# Patient Record
Sex: FEMALE | Race: WHITE | NOT HISPANIC OR LATINO | Employment: OTHER | URBAN - METROPOLITAN AREA
[De-identification: names, ages, dates, MRNs, and addresses within clinical notes are randomized per-mention and may not be internally consistent; named-entity substitution may affect disease eponyms.]

---

## 2023-04-03 ENCOUNTER — HOSPITAL ENCOUNTER (OUTPATIENT)
Dept: DATA CONVERSION | Facility: HOSPITAL | Age: 68
End: 2023-04-03
Attending: INTERNAL MEDICINE | Admitting: INTERNAL MEDICINE
Payer: MEDICARE

## 2023-04-03 DIAGNOSIS — I25.119 ATHEROSCLEROTIC HEART DISEASE OF NATIVE CORONARY ARTERY WITH UNSPECIFIED ANGINA PECTORIS (CMS-HCC): ICD-10-CM

## 2023-04-03 DIAGNOSIS — R07.9 CHEST PAIN, UNSPECIFIED: ICD-10-CM

## 2023-04-03 DIAGNOSIS — E78.5 HYPERLIPIDEMIA, UNSPECIFIED: ICD-10-CM

## 2023-04-03 DIAGNOSIS — I10 ESSENTIAL (PRIMARY) HYPERTENSION: ICD-10-CM

## 2023-04-03 DIAGNOSIS — R93.1 ABNORMAL FINDINGS ON DIAGNOSTIC IMAGING OF HEART AND CORONARY CIRCULATION: ICD-10-CM

## 2023-04-03 DIAGNOSIS — E66.9 OBESITY, UNSPECIFIED: ICD-10-CM

## 2023-05-24 PROBLEM — H52.7 REFRACTIVE ERROR: Status: ACTIVE | Noted: 2023-05-24

## 2023-05-24 PROBLEM — H57.9 ITCHY EYES: Status: ACTIVE | Noted: 2023-05-24

## 2023-05-24 PROBLEM — N18.30 CKD (CHRONIC KIDNEY DISEASE) STAGE 3, GFR 30-59 ML/MIN (MULTI): Status: ACTIVE | Noted: 2023-05-24

## 2023-05-24 PROBLEM — I87.2 CHRONIC VENOUS INSUFFICIENCY: Status: ACTIVE | Noted: 2023-05-24

## 2023-05-24 PROBLEM — H02.9 LESION OF RIGHT EYELID: Status: ACTIVE | Noted: 2023-05-24

## 2023-05-24 PROBLEM — Z97.3 WEARS EYEGLASSES: Status: ACTIVE | Noted: 2023-05-24

## 2023-05-24 PROBLEM — E03.9 HYPOTHYROIDISM: Status: ACTIVE | Noted: 2023-05-24

## 2023-05-24 PROBLEM — N32.81 OVERACTIVE BLADDER: Status: ACTIVE | Noted: 2023-05-24

## 2023-05-24 PROBLEM — G89.29 CHRONIC LOW BACK PAIN: Status: ACTIVE | Noted: 2023-05-24

## 2023-05-24 PROBLEM — J45.909 ASTHMA (HHS-HCC): Status: ACTIVE | Noted: 2023-05-24

## 2023-05-24 PROBLEM — I10 ESSENTIAL HYPERTENSION: Status: ACTIVE | Noted: 2023-05-24

## 2023-05-24 PROBLEM — J01.90 ACUTE SINUSITIS: Status: ACTIVE | Noted: 2023-05-24

## 2023-05-24 PROBLEM — G47.00 INSOMNIA: Status: ACTIVE | Noted: 2023-05-24

## 2023-05-24 PROBLEM — G47.30 SLEEP APNEA: Status: ACTIVE | Noted: 2023-05-24

## 2023-05-24 PROBLEM — E66.01 MORBID OBESITY (MULTI): Status: ACTIVE | Noted: 2023-05-24

## 2023-05-24 PROBLEM — H53.8 BLURRED VISION, BILATERAL: Status: ACTIVE | Noted: 2023-05-24

## 2023-05-24 PROBLEM — F32.A DEPRESSION: Status: ACTIVE | Noted: 2023-05-24

## 2023-05-24 PROBLEM — J30.9 ALLERGIC RHINITIS: Status: ACTIVE | Noted: 2023-05-24

## 2023-05-24 PROBLEM — H81.10 BPV (BENIGN POSITIONAL VERTIGO): Status: ACTIVE | Noted: 2023-05-24

## 2023-05-24 PROBLEM — E87.6 HYPOKALEMIA: Status: ACTIVE | Noted: 2023-05-24

## 2023-05-24 PROBLEM — H25.13 NUCLEAR SCLEROSIS OF BOTH EYES: Status: ACTIVE | Noted: 2023-05-24

## 2023-05-24 PROBLEM — M25.562 LEFT KNEE PAIN: Status: ACTIVE | Noted: 2023-05-24

## 2023-05-24 PROBLEM — M54.50 CHRONIC LOW BACK PAIN: Status: ACTIVE | Noted: 2023-05-24

## 2023-05-24 RX ORDER — FAMOTIDINE 20 MG/1
20 TABLET, FILM COATED ORAL NIGHTLY
COMMUNITY
Start: 2022-11-29 | End: 2023-10-02 | Stop reason: SDUPTHER

## 2023-05-24 RX ORDER — AZELASTINE 1 MG/ML
2 SPRAY, METERED NASAL 2 TIMES DAILY
COMMUNITY
Start: 2022-11-29

## 2023-05-24 RX ORDER — LISINOPRIL 40 MG/1
1 TABLET ORAL DAILY
COMMUNITY
Start: 2016-06-28 | End: 2023-07-14

## 2023-05-24 RX ORDER — HYDROCHLOROTHIAZIDE 25 MG/1
1 TABLET ORAL DAILY
COMMUNITY
Start: 2022-04-21 | End: 2023-07-14

## 2023-05-24 RX ORDER — ATENOLOL 100 MG/1
1 TABLET ORAL DAILY
COMMUNITY
Start: 2013-09-27 | End: 2023-07-14

## 2023-05-24 RX ORDER — ALBUTEROL SULFATE 90 UG/1
AEROSOL, METERED RESPIRATORY (INHALATION)
COMMUNITY
Start: 2015-04-10

## 2023-05-24 RX ORDER — LEVOTHYROXINE SODIUM 50 UG/1
1 TABLET ORAL DAILY
COMMUNITY
Start: 2012-07-02 | End: 2023-07-14

## 2023-05-24 RX ORDER — ACETAMINOPHEN 500 MG
1 TABLET ORAL DAILY
COMMUNITY

## 2023-05-24 RX ORDER — NITROGLYCERIN 0.4 MG/1
TABLET SUBLINGUAL
COMMUNITY
Start: 2023-03-16 | End: 2023-12-14 | Stop reason: WASHOUT

## 2023-05-24 RX ORDER — ROSUVASTATIN CALCIUM 10 MG/1
10 TABLET, COATED ORAL NIGHTLY
COMMUNITY
Start: 2023-03-16

## 2023-05-30 ENCOUNTER — OFFICE VISIT (OUTPATIENT)
Dept: PRIMARY CARE | Facility: CLINIC | Age: 68
End: 2023-05-30
Payer: MEDICARE

## 2023-05-30 ENCOUNTER — LAB (OUTPATIENT)
Dept: LAB | Facility: LAB | Age: 68
End: 2023-05-30
Payer: MEDICARE

## 2023-05-30 VITALS
OXYGEN SATURATION: 95 % | DIASTOLIC BLOOD PRESSURE: 61 MMHG | SYSTOLIC BLOOD PRESSURE: 110 MMHG | WEIGHT: 293 LBS | HEART RATE: 53 BPM | BODY MASS INDEX: 48.82 KG/M2 | TEMPERATURE: 98 F | HEIGHT: 65 IN

## 2023-05-30 DIAGNOSIS — Z12.11 ENCOUNTER FOR SCREENING FOR MALIGNANT NEOPLASM OF COLON: ICD-10-CM

## 2023-05-30 DIAGNOSIS — M54.42 CHRONIC BILATERAL LOW BACK PAIN WITH BILATERAL SCIATICA: ICD-10-CM

## 2023-05-30 DIAGNOSIS — E66.01 MORBID OBESITY (MULTI): ICD-10-CM

## 2023-05-30 DIAGNOSIS — M54.41 CHRONIC BILATERAL LOW BACK PAIN WITH BILATERAL SCIATICA: ICD-10-CM

## 2023-05-30 DIAGNOSIS — G89.29 CHRONIC BILATERAL LOW BACK PAIN WITH BILATERAL SCIATICA: ICD-10-CM

## 2023-05-30 DIAGNOSIS — Z00.00 ROUTINE GENERAL MEDICAL EXAMINATION AT HEALTH CARE FACILITY: ICD-10-CM

## 2023-05-30 DIAGNOSIS — Z12.31 ENCOUNTER FOR SCREENING MAMMOGRAM FOR MALIGNANT NEOPLASM OF BREAST: ICD-10-CM

## 2023-05-30 DIAGNOSIS — Z12.83 SKIN CANCER SCREENING: ICD-10-CM

## 2023-05-30 DIAGNOSIS — L71.9 ROSACEA: ICD-10-CM

## 2023-05-30 DIAGNOSIS — E03.8 OTHER SPECIFIED HYPOTHYROIDISM: ICD-10-CM

## 2023-05-30 DIAGNOSIS — Z00.00 ROUTINE GENERAL MEDICAL EXAMINATION AT HEALTH CARE FACILITY: Primary | ICD-10-CM

## 2023-05-30 LAB
ALANINE AMINOTRANSFERASE (SGPT) (U/L) IN SER/PLAS: 9 U/L (ref 7–45)
ALBUMIN (G/DL) IN SER/PLAS: 4.1 G/DL (ref 3.4–5)
ALKALINE PHOSPHATASE (U/L) IN SER/PLAS: 115 U/L (ref 33–136)
ANION GAP IN SER/PLAS: 11 MMOL/L (ref 10–20)
ASPARTATE AMINOTRANSFERASE (SGOT) (U/L) IN SER/PLAS: 14 U/L (ref 9–39)
BILIRUBIN TOTAL (MG/DL) IN SER/PLAS: 0.5 MG/DL (ref 0–1.2)
CALCIUM (MG/DL) IN SER/PLAS: 9.7 MG/DL (ref 8.6–10.6)
CARBON DIOXIDE, TOTAL (MMOL/L) IN SER/PLAS: 27 MMOL/L (ref 21–32)
CHLORIDE (MMOL/L) IN SER/PLAS: 105 MMOL/L (ref 98–107)
CHOLESTEROL (MG/DL) IN SER/PLAS: 116 MG/DL (ref 0–199)
CHOLESTEROL IN HDL (MG/DL) IN SER/PLAS: 45.3 MG/DL
CHOLESTEROL/HDL RATIO: 2.6
CREATININE (MG/DL) IN SER/PLAS: 1.05 MG/DL (ref 0.5–1.05)
ERYTHROCYTE DISTRIBUTION WIDTH (RATIO) BY AUTOMATED COUNT: 12.6 % (ref 11.5–14.5)
ERYTHROCYTE MEAN CORPUSCULAR HEMOGLOBIN CONCENTRATION (G/DL) BY AUTOMATED: 31.4 G/DL (ref 32–36)
ERYTHROCYTE MEAN CORPUSCULAR VOLUME (FL) BY AUTOMATED COUNT: 94 FL (ref 80–100)
ERYTHROCYTES (10*6/UL) IN BLOOD BY AUTOMATED COUNT: 3.92 X10E12/L (ref 4–5.2)
GFR FEMALE: 58 ML/MIN/1.73M2
GLUCOSE (MG/DL) IN SER/PLAS: 111 MG/DL (ref 74–99)
HEMATOCRIT (%) IN BLOOD BY AUTOMATED COUNT: 36.9 % (ref 36–46)
HEMOGLOBIN (G/DL) IN BLOOD: 11.6 G/DL (ref 12–16)
LDL: 54 MG/DL (ref 0–99)
LEUKOCYTES (10*3/UL) IN BLOOD BY AUTOMATED COUNT: 8.2 X10E9/L (ref 4.4–11.3)
NRBC (PER 100 WBCS) BY AUTOMATED COUNT: 0 /100 WBC (ref 0–0)
PLATELETS (10*3/UL) IN BLOOD AUTOMATED COUNT: 300 X10E9/L (ref 150–450)
POTASSIUM (MMOL/L) IN SER/PLAS: 4.3 MMOL/L (ref 3.5–5.3)
PROTEIN TOTAL: 6.9 G/DL (ref 6.4–8.2)
SODIUM (MMOL/L) IN SER/PLAS: 139 MMOL/L (ref 136–145)
THYROTROPIN (MIU/L) IN SER/PLAS BY DETECTION LIMIT <= 0.05 MIU/L: 2.28 MIU/L (ref 0.44–3.98)
TRIGLYCERIDE (MG/DL) IN SER/PLAS: 83 MG/DL (ref 0–149)
UREA NITROGEN (MG/DL) IN SER/PLAS: 19 MG/DL (ref 6–23)
VLDL: 17 MG/DL (ref 0–40)

## 2023-05-30 PROCEDURE — 85027 COMPLETE CBC AUTOMATED: CPT

## 2023-05-30 PROCEDURE — 3078F DIAST BP <80 MM HG: CPT | Performed by: INTERNAL MEDICINE

## 2023-05-30 PROCEDURE — 36415 COLL VENOUS BLD VENIPUNCTURE: CPT

## 2023-05-30 PROCEDURE — 1159F MED LIST DOCD IN RCRD: CPT | Performed by: INTERNAL MEDICINE

## 2023-05-30 PROCEDURE — 84443 ASSAY THYROID STIM HORMONE: CPT

## 2023-05-30 PROCEDURE — 80053 COMPREHEN METABOLIC PANEL: CPT

## 2023-05-30 PROCEDURE — 1170F FXNL STATUS ASSESSED: CPT | Performed by: INTERNAL MEDICINE

## 2023-05-30 PROCEDURE — 1036F TOBACCO NON-USER: CPT | Performed by: INTERNAL MEDICINE

## 2023-05-30 PROCEDURE — 80061 LIPID PANEL: CPT

## 2023-05-30 PROCEDURE — G0439 PPPS, SUBSEQ VISIT: HCPCS | Performed by: INTERNAL MEDICINE

## 2023-05-30 PROCEDURE — 3074F SYST BP LT 130 MM HG: CPT | Performed by: INTERNAL MEDICINE

## 2023-05-30 RX ORDER — ACETAMINOPHEN 500 MG
TABLET ORAL EVERY 6 HOURS PRN
COMMUNITY

## 2023-05-30 ASSESSMENT — PATIENT HEALTH QUESTIONNAIRE - PHQ9
4. FEELING TIRED OR HAVING LITTLE ENERGY: NEARLY EVERY DAY
3. TROUBLE FALLING OR STAYING ASLEEP OR SLEEPING TOO MUCH: NOT AT ALL
6. FEELING BAD ABOUT YOURSELF - OR THAT YOU ARE A FAILURE OR HAVE LET YOURSELF OR YOUR FAMILY DOWN: SEVERAL DAYS
SUM OF ALL RESPONSES TO PHQ9 QUESTIONS 1 AND 2: 3
SUM OF ALL RESPONSES TO PHQ QUESTIONS 1-9: 10
2. FEELING DOWN, DEPRESSED OR HOPELESS: NEARLY EVERY DAY
8. MOVING OR SPEAKING SO SLOWLY THAT OTHER PEOPLE COULD HAVE NOTICED. OR THE OPPOSITE, BEING SO FIGETY OR RESTLESS THAT YOU HAVE BEEN MOVING AROUND A LOT MORE THAN USUAL: NOT AT ALL
7. TROUBLE CONCENTRATING ON THINGS, SUCH AS READING THE NEWSPAPER OR WATCHING TELEVISION: NOT AT ALL
1. LITTLE INTEREST OR PLEASURE IN DOING THINGS: NOT AT ALL
5. POOR APPETITE OR OVEREATING: NEARLY EVERY DAY
10. IF YOU CHECKED OFF ANY PROBLEMS, HOW DIFFICULT HAVE THESE PROBLEMS MADE IT FOR YOU TO DO YOUR WORK, TAKE CARE OF THINGS AT HOME, OR GET ALONG WITH OTHER PEOPLE: SOMEWHAT DIFFICULT
9. THOUGHTS THAT YOU WOULD BE BETTER OFF DEAD, OR OF HURTING YOURSELF: NOT AT ALL

## 2023-05-30 ASSESSMENT — ACTIVITIES OF DAILY LIVING (ADL)
BATHING: INDEPENDENT
MANAGING_FINANCES: INDEPENDENT
DRESSING: INDEPENDENT
GROCERY_SHOPPING: INDEPENDENT
TAKING_MEDICATION: INDEPENDENT
DOING_HOUSEWORK: INDEPENDENT

## 2023-05-30 ASSESSMENT — ENCOUNTER SYMPTOMS
OCCASIONAL FEELINGS OF UNSTEADINESS: 1
DEPRESSION: 1
LOSS OF SENSATION IN FEET: 1

## 2023-05-30 NOTE — PROGRESS NOTES
"Subjective   Reason for Visit: Ariana Wills is an 67 y.o. female here for a Medicare Wellness visit.     Past Medical, Surgical, and Family History reviewed and updated in chart.    Reviewed all medications by prescribing practitioner or clinical pharmacist (such as prescriptions, OTCs, herbal therapies and supplements) and documented in the medical record.    HPI  Medicare wellness.  Facial rosacea, needs derm referral, also for skin ca screening  Due for Colonoscopy screening.  Up to date with vaccines.  Had one dose of shingrix so far  Some left sided neck pain, anteriorly, has had for two years. Has seen ENT. They did not find any cause for pain. Cardiac work up was normal cath.  Does have left sided posterior neck muscle pain, which she does try to do neck exercises for  Chronic low back pain, radiation to legs. Leg muscles are weaker over time. Seen spine doctor 10 years ago, had epidural injection. First injection helped.     Patient Care Team:  Octavia Garza MD as PCP - General  Octavia Garza MD as PCP - [employer] PCP     Review of Systems    Objective   Vitals:  /61   Pulse 53   Temp 36.7 °C (98 °F)   Ht 1.645 m (5' 4.75\")   Wt 145 kg (319 lb)   SpO2 95%   BMI 53.50 kg/m²       Physical Exam  Facial rosacea noted on cheek and chin  ENT normal  No Neck gland swelling  No carotid bruit  No neck mass or thyroid enlargement  Neck exam showed no mass, lymphadenopathy, or thyroid enlargement, and no carotid bruit.  No Abdominal pain  Chronic leg edema.    Assessment/Plan    1. Routine general medical examination at health care facility     - CBC; Future  - Comprehensive Metabolic Panel; Future  - Lipid Panel; Future    2. Encounter for screening mammogram for malignant neoplasm of breast     - BI mammo bilateral screening tomosynthesis; Future    3. Other specified hypothyroidism     - CBC; Future  - TSH with reflex to Free T4 if abnormal; Future    4. Morbid obesity (CMS/HCC)     - " CBC; Future  - Comprehensive Metabolic Panel; Future  - Lipid Panel; Future    5. Encounter for screening for malignant neoplasm of colon     - Colonoscopy; Future    6. Rosacea     - Referral to Dermatology; Future    7. Skin cancer screening     - Referral to Dermatology; Future    8. Chronic bilateral low back pain with bilateral sciatica     - Referral to Medical Spine; Future

## 2023-07-13 DIAGNOSIS — I10 ESSENTIAL HYPERTENSION: ICD-10-CM

## 2023-07-13 DIAGNOSIS — E03.8 OTHER SPECIFIED HYPOTHYROIDISM: Primary | ICD-10-CM

## 2023-07-14 RX ORDER — ATENOLOL 100 MG/1
TABLET ORAL
Qty: 90 TABLET | Refills: 3 | Status: SHIPPED | OUTPATIENT
Start: 2023-07-14

## 2023-07-14 RX ORDER — HYDROCHLOROTHIAZIDE 25 MG/1
TABLET ORAL
Qty: 90 TABLET | Refills: 3 | Status: SHIPPED | OUTPATIENT
Start: 2023-07-14

## 2023-07-14 RX ORDER — LISINOPRIL 40 MG/1
TABLET ORAL
Qty: 90 TABLET | Refills: 3 | Status: SHIPPED | OUTPATIENT
Start: 2023-07-14

## 2023-07-14 RX ORDER — LEVOTHYROXINE SODIUM 50 UG/1
TABLET ORAL
Qty: 90 TABLET | Refills: 3 | Status: SHIPPED | OUTPATIENT
Start: 2023-07-14

## 2023-08-23 ENCOUNTER — HOSPITAL ENCOUNTER (OUTPATIENT)
Dept: DATA CONVERSION | Facility: HOSPITAL | Age: 68
End: 2023-08-23
Attending: INTERNAL MEDICINE
Payer: MEDICARE

## 2023-08-23 DIAGNOSIS — Z80.0 FAMILY HISTORY OF MALIGNANT NEOPLASM OF DIGESTIVE ORGANS: ICD-10-CM

## 2023-08-23 DIAGNOSIS — Z12.11 ENCOUNTER FOR SCREENING FOR MALIGNANT NEOPLASM OF COLON: ICD-10-CM

## 2023-08-23 DIAGNOSIS — K64.8 OTHER HEMORRHOIDS: ICD-10-CM

## 2023-08-23 DIAGNOSIS — Z86.010 PERSONAL HISTORY OF COLONIC POLYPS: ICD-10-CM

## 2023-08-23 DIAGNOSIS — K57.30 DIVERTICULOSIS OF LARGE INTESTINE WITHOUT PERFORATION OR ABSCESS WITHOUT BLEEDING: ICD-10-CM

## 2023-08-23 DIAGNOSIS — D12.5 BENIGN NEOPLASM OF SIGMOID COLON: ICD-10-CM

## 2023-08-23 PROBLEM — R29.898 WEAKNESS OF BOTH LOWER EXTREMITIES: Status: ACTIVE | Noted: 2023-08-23

## 2023-08-23 PROBLEM — R60.0 LOCALIZED EDEMA: Status: ACTIVE | Noted: 2023-08-23

## 2023-08-23 PROBLEM — H93.13 SUBJECTIVE TINNITUS OF BOTH EARS: Status: ACTIVE | Noted: 2023-08-23

## 2023-08-23 PROBLEM — K21.9 LARYNGOPHARYNGEAL REFLUX (LPR): Status: ACTIVE | Noted: 2023-08-23

## 2023-08-23 PROBLEM — M19.041 PRIMARY OSTEOARTHRITIS, RIGHT HAND: Status: ACTIVE | Noted: 2023-08-23

## 2023-08-23 PROBLEM — H61.21 IMPACTED CERUMEN OF RIGHT EAR: Status: ACTIVE | Noted: 2023-08-23

## 2023-08-23 PROBLEM — M48.061 LUMBAR SPINAL STENOSIS: Status: ACTIVE | Noted: 2023-08-23

## 2023-08-23 PROBLEM — H91.90 HEARING LOSS: Status: ACTIVE | Noted: 2023-08-23

## 2023-08-23 PROBLEM — H90.41 SENSORINEURAL HEARING LOSS (SNHL) OF RIGHT EAR WITH UNRESTRICTED HEARING OF LEFT EAR: Status: ACTIVE | Noted: 2023-08-23

## 2023-08-23 PROBLEM — M19.011 OSTEOARTHRITIS OF RIGHT SHOULDER: Status: ACTIVE | Noted: 2023-08-23

## 2023-08-23 PROBLEM — I89.0 LYMPHEDEMA OF BOTH LOWER EXTREMITIES: Status: ACTIVE | Noted: 2023-08-23

## 2023-08-23 PROBLEM — R60.9 LIPEDEMA: Status: ACTIVE | Noted: 2023-08-23

## 2023-08-23 RX ORDER — GABAPENTIN 300 MG/1
1 CAPSULE ORAL 2 TIMES DAILY
COMMUNITY
End: 2023-11-20

## 2023-08-23 RX ORDER — FLUNISOLIDE 0.25 MG/ML
SOLUTION NASAL
COMMUNITY
Start: 2022-11-01

## 2023-08-30 LAB
COMPLETE PATHOLOGY REPORT: NORMAL
CONVERTED CLINICAL DIAGNOSIS-HISTORY: NORMAL
CONVERTED FINAL DIAGNOSIS: NORMAL
CONVERTED FINAL REPORT PDF LINK TO COPY AND PASTE: NORMAL
CONVERTED GROSS DESCRIPTION: NORMAL

## 2023-09-06 VITALS — BODY MASS INDEX: 51.24 KG/M2 | WEIGHT: 293 LBS

## 2023-09-14 NOTE — H&P
History & Physical Reviewed:   I have reviewed the History and Physical dated:  16-Mar-2023   History and Physical reviewed and relevant findings noted. Patient examined to review pertinent physical  findings.: No significant changes   Home Medications Reviewed: no changes noted   Allergies Reviewed: no changes noted       Airway/Sedation Assessment:  ·  Emotional Status calm   ·  Neurologic alert & oriented x 3   ·  Respiratory clear to auscultation   ·  Cardiovascular rhythm & rate regular   ·  GI/ soft, nontender     · Pulses present: Pedal Left, Pedal Right, Radial Left, Radial Right     ·  Mouth Opening OK yes   ·  Neck Flexibility OK yes   ·  Loose Teeth no     Oropharyngeal Classification:          ·  Oropharyngeal Classification Class I   ·  ASA PS Classification ASA II   ·  Sedation Plan moderate sedation       ERAS (Enhanced Recovery After Surgery):  ·  ERAS Patient: no     Consent:   COVID-19 Consent:  ·  COVID-19 Risk Consent Surgeon has reviewed key risks related to the risk of markell COVID-19 and if they contract COVID-19 what the risks are.       Electronic Signatures:  Enzo Hughes)  (Signed 03-Apr-2023 08:08)   Authored: History & Physical Reviewed, Airway/Sedation,  ERAS, Consent, Note Completion      Last Updated: 03-Apr-2023 08:08 by Enzo Hughes)

## 2023-10-02 DIAGNOSIS — K21.9 GASTRO-ESOPHAGEAL REFLUX DISEASE WITHOUT ESOPHAGITIS: ICD-10-CM

## 2023-10-02 RX ORDER — FAMOTIDINE 20 MG/1
20 TABLET, FILM COATED ORAL NIGHTLY
Qty: 30 TABLET | Refills: 2 | Status: SHIPPED | OUTPATIENT
Start: 2023-10-02 | End: 2024-01-03 | Stop reason: SDUPTHER

## 2023-10-03 ENCOUNTER — TREATMENT (OUTPATIENT)
Dept: OCCUPATIONAL THERAPY | Facility: CLINIC | Age: 68
End: 2023-10-03
Payer: MEDICARE

## 2023-10-03 DIAGNOSIS — M79.605 PAIN IN BOTH LOWER EXTREMITIES: ICD-10-CM

## 2023-10-03 DIAGNOSIS — I89.0 LYMPHEDEMA OF BOTH LOWER EXTREMITIES: Primary | ICD-10-CM

## 2023-10-03 DIAGNOSIS — M79.604 PAIN IN BOTH LOWER EXTREMITIES: ICD-10-CM

## 2023-10-03 DIAGNOSIS — R29.898 WEAKNESS OF BOTH LOWER EXTREMITIES: ICD-10-CM

## 2023-10-03 DIAGNOSIS — R60.9 LIPEDEMA: ICD-10-CM

## 2023-10-03 PROCEDURE — 97535 SELF CARE MNGMENT TRAINING: CPT | Mod: GO

## 2023-10-03 PROCEDURE — 97140 MANUAL THERAPY 1/> REGIONS: CPT | Mod: GO

## 2023-10-03 ASSESSMENT — PAIN - FUNCTIONAL ASSESSMENT: PAIN_FUNCTIONAL_ASSESSMENT: 0-10

## 2023-10-03 ASSESSMENT — PAIN SCALES - GENERAL: PAINLEVEL_OUTOF10: 2

## 2023-10-03 ASSESSMENT — PAIN DESCRIPTION - DESCRIPTORS: DESCRIPTORS: ACHING

## 2023-10-03 NOTE — PROGRESS NOTES
Occupational Therapy    Occupational Therapy Treatment    Name: Ariana Wills  MRN: [id number]  : 1955  Date: 10/03/23  Time Calculation  Start Time: 1001  Stop Time: 1056  Time Calculation (min): 55 min    Assessment:     Plan:       Subjective   General:        Pain Assessment:  Pain Assessment  Pain Assessment: 0-10  Pain Score: 2  Pain Location: Leg  Pain Orientation: Right, Left  Pain Descriptors: Aching  Pain Frequency: Intermittent         Therapy/Activity:      Therapeutic Activity  Therapeutic Activity Performed: Yes  Therapeutic Activity 1: Discussed with pt varous garments with pt for BLEs.  pt in agreement for inelastic wraps with liner    Manual Therapy  Manual Therapy Performed: Yes Completed SSB with custom fit foam on anterior/posterior sides of ble, cellona applied on BLEs, utilized 8, 10, 12 wrappings with anchored with size H tubigrip.  Applied tubigrip on BL feet and ankle in order to promote shape of LE and volume reduction        Pain Assessment:  Pain Assessment: 0-10  Pain Score: 2  Pain Location: Leg  Pain Orientation: Right, Left  Pain Descriptors: Aching  Pain Frequency: Intermittent  Therapy Precautions:       Lymphedema Assessment    Lymphedema Assessments:  Lymphedema Assessments: Lower extremities  Right Upper Extremity:     Left Upper Extremity:     UE Skin Appearance/Condition and Girth:     Upper Extremity Evaluation:     Right Lower Extremity:  R Metatarsal (cm): 21.5 cm  R Heel Y Angle: 35.4 cm  R Ankle (cm): 30 cm  R 10 cm Above Ankle (cm): 38 cm  R 20 cm Above Ankle (cm): 54 cm  R 30 cm Above Ankle (cm): 62 cm  R 40 cm Above Ankle (cm): 0 cm  R Knee (cm): 64 cm  R 10 cm Above Knee (cm): 77 cm  R 20 cm Above Knee (cm): 82 cm  R 30 cm Above Knee (cm): 0 cm  R 40 cm Above Knee (cm): 0 cm  R 50 cm Above Knee (cm): 0 cm  Right lower extremity total: 463.9 decreased 3.9 cm from last session   Left Lower Extremity:  L Metatarsal (cm): 21.5 cm  L Heel Y Angle: 36.3 cm  L Ankle  (cm): 31.2 cm  L 10 cm Above Ankle (cm): 42 cm  L 20 cm Above Ankle (cm): 58 cm  L 30 cm Above Ankle (cm): 60.3 cm  L 40 cm Above Ankle (cm): 0 cm  L Knee (cm): 62.5 cm  L 10 cm Above Knee (cm): 78 cm  L 20 cm Above Knee (cm): 83 cm  L 30 cm Above Knee (cm): 0 cm  L 40 cm Above Knee (cm): 0 cm  L 50 cm Above Knee (cm): 0 cm  Left Lower extremity total: 472.8 decreased 2.3 cm from last session   LE Skin Appearance/Condition and Girth:        Prior Function:     Pain Assessment:  Pain Assessment: 0-10  Pain Score: 2  Pain Location: Leg  Pain Orientation: Right, Left  Pain Descriptors: Aching  Pain Frequency: Intermittent  Therapy Precautions:         Outcome Measures:      OP EDUCATION:       Goals:  Encounter Problems       Encounter Problems (Active)       OT/lymphedema goals        HEP: Pt will be I with stating and demonstrating home exercise program in order to improve patients ability to perform self-care, household, work and/or leisure tasks.        Start:  10/03/23    Expected End:  11/10/23            Pain: Pt will be able to perform self care, household, work and or leisure tasks with 0-2 /10 pain rating, 100 % of the time        Start:  10/03/23    Expected End:  11/10/23            Lymphedema: Pt will demo 5% cm of volume reduction with BLE in order for proper fitting of medical compression garments and increase ease in transfers and IADLs        Start:  10/03/23    Expected End:  11/10/23            Motor Function/Control/Tone:  Pt will I perform skin care for infection prevention and integrity of skin       Start:  10/03/23    Expected End:  11/10/23

## 2023-10-06 ENCOUNTER — TREATMENT (OUTPATIENT)
Dept: OCCUPATIONAL THERAPY | Facility: CLINIC | Age: 68
End: 2023-10-06
Payer: MEDICARE

## 2023-10-06 DIAGNOSIS — M79.605 PAIN IN BOTH LOWER EXTREMITIES: ICD-10-CM

## 2023-10-06 DIAGNOSIS — I89.0 LYMPHEDEMA OF BOTH LOWER EXTREMITIES: Primary | ICD-10-CM

## 2023-10-06 DIAGNOSIS — M79.604 PAIN IN BOTH LOWER EXTREMITIES: ICD-10-CM

## 2023-10-06 DIAGNOSIS — R60.9 LIPEDEMA: ICD-10-CM

## 2023-10-06 DIAGNOSIS — R29.898 WEAKNESS OF BOTH LOWER EXTREMITIES: ICD-10-CM

## 2023-10-06 PROCEDURE — 97140 MANUAL THERAPY 1/> REGIONS: CPT | Mod: GO

## 2023-10-06 PROCEDURE — 97535 SELF CARE MNGMENT TRAINING: CPT | Mod: GO

## 2023-10-06 ASSESSMENT — ENCOUNTER SYMPTOMS
DEPRESSION: 1
OCCASIONAL FEELINGS OF UNSTEADINESS: 1
LOSS OF SENSATION IN FEET: 0

## 2023-10-06 ASSESSMENT — ACTIVITIES OF DAILY LIVING (ADL): HOME_MANAGEMENT_TIME_ENTRY: 34

## 2023-10-06 ASSESSMENT — PAIN - FUNCTIONAL ASSESSMENT: PAIN_FUNCTIONAL_ASSESSMENT: 0-10

## 2023-10-06 ASSESSMENT — PAIN SCALES - GENERAL: PAINLEVEL_OUTOF10: 5 - MODERATE PAIN

## 2023-10-06 NOTE — PROGRESS NOTES
Occupational Therapy    Occupational Therapy Treatment    Name: Ariana Wills  MRN: [id number]  : 1955  Date: 10/06/23  Time Calculation  Start Time: 957  Stop Time:   Time Calculation (min): 73 min    Assessment:     Plan:       Subjective   General:  OT Last Visit  OT Received On: 10/03/23     Pain Assessment:  Pain Assessment  Pain Assessment: 0-10  Pain Score: 5 - Moderate pain  Pain Location: Back    Objective    A   Therapy/Activity:      Therapeutic Activity  Therapeutic Activity Performed: Yes  Therapeutic Activity 1: Reviewed with pt various samples of velcro wreaps in order to promote volume reduction and increase flow.  contacted [employer] about Sigvaris for extender for upper calf.  34 minutes     Manual Therapy  Manual Therapy Performed: Yes   Washed dried and applied lotion  Completed SSB with custom fit foam on anterior/posterior sides of BL calves , cellona applied on BLEs, utilized 8, 10, 12 wrappings with anchored with size H tubigrip.  Applied tubigrip on BL feet and ankle in order to promote shape of LE and volume reduction   49 minutes  Sensory:     Cognitive Skill Development:     Community/Work Reintegration Training:     Community Mobility:     Vision:     Strength:     Other Activity:     Home environment:     Lymphedema Assessment    Lymphedema Assessments:  Lymphedema Assessments: Lower extremities  Right Upper Extremity:     Left Upper Extremity:     UE Skin Appearance/Condition and Girth:     Upper Extremity Evaluation:     Right Lower Extremity:  R Metatarsal (cm): 21.5 cm  R Heel Y Angle: 35.4 cm  R Ankle (cm): 29.5 cm  R 10 cm Above Ankle (cm): 39 cm  R 20 cm Above Ankle (cm): 56 cm  R 30 cm Above Ankle (cm): 62 cm  R 40 cm Above Ankle (cm): 0 cm  R Knee (cm): 61.5 cm  R 10 cm Above Knee (cm): 78.5 cm  R 20 cm Above Knee (cm): 78 cm  R 30 cm Above Knee (cm): 0 cm  R 40 cm Above Knee (cm): 0 cm  R 50 cm Above Knee (cm): 0 cm  Right lower extremity total: 461.4  decreased 2.5  cm from last session   Left Lower Extremity:   L Metatarsal (cm): 21 cm  L Heel Y Angle: 36 cm  L Ankle (cm): 30.8 cm  L 10 cm Above Ankle (cm): 40.5 cm  L 20 cm Above Ankle (cm): 58 cm  L 30 cm Above Ankle (cm): 60 cm  L 40 cm Above Ankle (cm): 0 cm  L Knee (cm): 64 cm  L 10 cm Above Knee (cm): 78 cm  L 20 cm Above Knee (cm): 80 cm  L 30 cm Above Knee (cm): 0 cm  L 40 cm Above Knee (cm): 0 cm  L 50 cm Above Knee (cm): 0 cm  Left Lower extremity total: 468.3 decreased 4.5 cm from last session   LE Skin Appearance/Condition and Girth:  Edema - Fibrotic: tissue pliability improved  L   Pain Assessment:  Pain Assessment: 0-10  Pain Score: 5 - Moderate pain  Pain Location: Back  Therapy Precautions:  STEADI Fall Risk Score (The score of 4 or more indicates an increased risk of falling): 3        OP EDUCATION:       Goals:

## 2023-10-10 ENCOUNTER — TREATMENT (OUTPATIENT)
Dept: OCCUPATIONAL THERAPY | Facility: CLINIC | Age: 68
End: 2023-10-10
Payer: MEDICARE

## 2023-10-10 DIAGNOSIS — M79.604 PAIN IN BOTH LOWER EXTREMITIES: ICD-10-CM

## 2023-10-10 DIAGNOSIS — I89.0 LYMPHEDEMA OF BOTH LOWER EXTREMITIES: Primary | ICD-10-CM

## 2023-10-10 DIAGNOSIS — M79.605 PAIN IN BOTH LOWER EXTREMITIES: ICD-10-CM

## 2023-10-10 DIAGNOSIS — R60.9 LIPEDEMA: ICD-10-CM

## 2023-10-10 DIAGNOSIS — R29.898 WEAKNESS OF BOTH LOWER EXTREMITIES: ICD-10-CM

## 2023-10-10 PROCEDURE — 97530 THERAPEUTIC ACTIVITIES: CPT | Mod: GO,59

## 2023-10-10 PROCEDURE — 97140 MANUAL THERAPY 1/> REGIONS: CPT | Mod: GO

## 2023-10-10 ASSESSMENT — PAIN - FUNCTIONAL ASSESSMENT: PAIN_FUNCTIONAL_ASSESSMENT: 0-10

## 2023-10-10 NOTE — PROGRESS NOTES
Occupational Therapy    Occupational Therapy Treatment/ Re check     Name: Ariana Wills  MRN: [id number]  : 1955  Date: 10/10/23  Time Calculation  Start Time: 1002  Stop Time: 1113  Time Calculation (min): 71 min    Assessment: see objective      Plan:  Plan of Care Agreement: Patient    Subjective   General:  OT Last Visit  OT Received On: 10/06/23     Pain Assessment:  Pain Assessment  Pain Assessment: 0-10    Casting:     Therapy/Activity:      Therapeutic Activity  Therapeutic Activity Performed: Yes  Therapeutic Activity 1: Pt in agreement to order with extender for Sigvaris transition for extender for upper calf.  Therapeutic Activity 2: re check completed with pt demo increased tissue pliability. edeme reduction on BLEs, decereased pain in BLes noted this date.  pt still completing SSB decongestive phase this date.  continue therapy 1-2x week for 4 weeks  26 minutes     Manual Therapy  Manual Therapy Performed: Yes  Manual Therapy Activity 1: see note   Completed SSB with custom fit foam on anterior/posterior sides of BLEs, cellona applied on BLEs, utilized 8, 10, 12 wrappings with anchored with size H tubigrip.  Applied tubigrip on BL feet and ankle in order to promote shape of LE and volume reduction    45 minutes   Lymphedema Assessment    Lymphedema Assessments:  Lymphedema Assessments: Lower extremities  Right Upper Extremity:     Left Upper Extremity:     UE Skin Appearance/Condition and Girth:     Upper Extremity Evaluation:     Right Lower Extremity:  R Metatarsal (cm): 21.3 cm  R Heel Y Angle: 35 cm  R Ankle (cm): 30.5 cm  R 10 cm Above Ankle (cm): 40.5 cm  R 20 cm Above Ankle (cm): 56 cm  R 30 cm Above Ankle (cm): 61 cm  R 40 cm Above Ankle (cm): 0 cm  R Knee (cm): 62.5 cm  R 10 cm Above Knee (cm): 78 cm  R 20 cm Above Knee (cm): 79.5 cm  R 30 cm Above Knee (cm): 0 cm  R 40 cm Above Knee (cm): 0 cm  R 50 cm Above Knee (cm): 0 cm  Right lower extremity total: 464.3 increased from 2.9 cm  from last session   Left Lower Extremity:  L Metatarsal (cm): 21 cm  L Heel Y Angle: 36 cm  L Ankle (cm): 31 cm  L 10 cm Above Ankle (cm): 43.5 cm  L 20 cm Above Ankle (cm): 58 cm  L 30 cm Above Ankle (cm): 61 cm  L 40 cm Above Ankle (cm): 0 cm  L Knee (cm): 62 cm  L 10 cm Above Knee (cm): 77 cm  L 20 cm Above Knee (cm): 81 cm  L 30 cm Above Knee (cm): 0 cm  L 40 cm Above Knee (cm): 0 cm  L 50 cm Above Knee (cm): 0 cm  Left Lower extremity total: 470.5 increased 2.2 cm from last session   LE Skin Appearance/Condition and Girth:  Edema - Fibrotic: buffalo hump below decreased iwth increased tissue pliability     Pain Assessment:  Pain Assessment: 0-10  Therapy Precautions:           Education  Education Provided: Lymphedema, Other  Home Program: Other    Goals:  Encounter Problems       Encounter Problems (Active)       OT/lymphedema goals        Pt will be I with stating and demonstrating home exercise program in order to improve patients ability to perform self-care, household, work and/or leisure tasks.  (Progressing)       Start:  10/10/23    Expected End:  11/10/23            Pt will be able to perform self care, household, work and or leisure tasks with   0-2/10 pain rating, 100 % of the time  (Progressing)       Start:  10/10/23    Expected End:  11/10/23            Pt will demo 5% cm  of volume reduction with BLE in order for proper fitting of medical compression garments and increase ease in transfers and IADLs  (Progressing)       Start:  10/10/23    Expected End:  11/10/23            Pt will I perform skin care for infection prevention and integrity of skin  (Progressing)       Start:  10/10/23    Expected End:  11/10/23            I with donning/doffing medical compression garments with AE as needed  (Progressing)       Start:  10/10/23    Expected End:  11/10/23

## 2023-10-12 ENCOUNTER — OFFICE VISIT (OUTPATIENT)
Dept: PHYSICAL MEDICINE AND REHAB | Facility: CLINIC | Age: 68
End: 2023-10-12
Payer: MEDICARE

## 2023-10-12 VITALS
TEMPERATURE: 97.4 F | RESPIRATION RATE: 18 BRPM | BODY MASS INDEX: 47.09 KG/M2 | HEIGHT: 66 IN | HEART RATE: 67 BPM | WEIGHT: 293 LBS

## 2023-10-12 DIAGNOSIS — M47.816 LUMBAR SPONDYLOSIS: Primary | ICD-10-CM

## 2023-10-12 PROCEDURE — 99214 OFFICE O/P EST MOD 30 MIN: CPT | Performed by: PHYSICAL MEDICINE & REHABILITATION

## 2023-10-12 PROCEDURE — 1036F TOBACCO NON-USER: CPT | Performed by: PHYSICAL MEDICINE & REHABILITATION

## 2023-10-12 PROCEDURE — 1159F MED LIST DOCD IN RCRD: CPT | Performed by: PHYSICAL MEDICINE & REHABILITATION

## 2023-10-12 PROCEDURE — 1125F AMNT PAIN NOTED PAIN PRSNT: CPT | Performed by: PHYSICAL MEDICINE & REHABILITATION

## 2023-10-12 RX ORDER — LIDOCAINE 50 MG/G
1 PATCH TOPICAL DAILY
Qty: 30 PATCH | Refills: 3 | Status: SHIPPED | OUTPATIENT
Start: 2023-10-12 | End: 2024-02-15

## 2023-10-12 ASSESSMENT — PAIN SCALES - GENERAL: PAINLEVEL: 3

## 2023-10-12 NOTE — PROGRESS NOTES
ref by Dr. Garza for back and leg pain and weakness.      History of Present IllnessPhysical Medicine and Rehabilitation MS[facility] fu     Chief Complaint:  Low back pain     HPI:  Ms. Wills is a 69 yo F w pmh of obesity, lymphedema, lipedema, hypothyroidism, gerd presenting with back and hip pain.     Recall  Onset: progressively worse in last 15 years  Location: across low back   Radiation: bilateral, worse on R, R lateral leg numbness ad in the calf  Quality: sharp  Duration: comes and goes  Aggravating factors: standing and walking, standing is worse  walking causes cramping in the legs and across the back  Uses a cane   Alleviating factors: laying down  Severity: [5/10] today, [10/10] most severe  Numbness/Tingling: yes idiopathic neuropathy in feet and hands  Bowel or bladder incontinence: yes overactive, bowel depends on food  Pt's current medication regimen includes: advil prn helps but cant take it renal insufficiency  tylen w some relief but less than advil     Treatment to date:  Physical therapy: none  Medications taken to date for this complaint include the following:   as above  Prior injections: 15 years ago not sure   ? nerve blocks      She was last seen in June 2023. at that time we discussed:  Back pain; likely spinal stenosis and facet arthropathy  - xr L spine, xr hips  - PT for core rom hep Le strengthening  - discussing swimming for exercise  - gabapentin 300 mg bid titration explained, renal dosing  - no nsaids given renal insufficiency    Limpholipedema  - Lymphedema therapy; would need custom garments, pump  - tsh, bmp wnl except renal insufficiency    Had lymphedema eval and now pending fu appts.  pain in low back on the right, worse w standing in one spot needs to lean forward   is better.  Gabapentin is helping sleep at night.     Gabapentin 300 mg bid not sure re back,.      She was last seen in August 2023. At that time we discussed:  Back pain; likely spinal stenosis and facet  arthropathy  - xr L spine, xr hips; reviewed w patient and on persnal review mild hip oa and extensive spondylosis  - PT for core rom hep Le strengthening  - discussing swimming for exercise  - gabapentin 300 mg bid titration explained, renal dosing; states overall helping   - no nsaids given renal insufficiency    Lympholipedema  - Lymphedema therapy; would need custom garments, pump  - tsh, bmp wnl except renal insufficiency       Received jaycee pants Sigvaris 10-15 mmhgh. Waiting for velcro wraps.   Waiting for pump.  States ankles are dowb but the front of the shins., Feet are down. Thighs fluctuate.  Started some exercises, takes 24-48 hrs to recover, while she is doing them its ok.    Imaging  Reviewed 10/12/23 personally      Xr pelvis 6/2023  FINDINGS:  No fracture or dislocation is evident.Mild degenerative changes seen  of the hips. Mild degenerative changes seen of the SI joints and the  pubic symphysis. Vascular calcifications are seen over the soft  tissues.     IMPRESSION:  Mild degenerative change of the hips without osseous injury evident.  Xr l spine 6/2023    FINDINGS:  There are  5 lumbar type vertebral bodies. There is grade 1  anterolisthesis of L3 on L4 and L4 on L5. Vertebral body height and  alignment  are otherwise maintained.  No fracture or dislocation is  evident.  There is severe L3-4 through L5-S1 facet degenerative  change bilaterally. There is severe L5-S1 discogenic degenerative  change. Mild discogenic and facet degenerative changes seen at other  levels of the visualized spine. Mild degenerative changes seen of the  sacroiliac joints. Vascular calcifications and surgical changes are  seen over the soft tissues.     IMPRESSION:  1. Multilevel spondylolisthesis.  2. Degenerative change as above.     [PMH]  renal insufficiency  hypothyroidism  HTN  GERD  HLP  PVCs          [SOCHX]  retired  used to work at as a unit clerk  lives w   social alcohol  no smoking or drug use    "  Review of Systems:  Constitutional: Denies fever or chills, malaise, weight changes.   Eyes: Denies change in visual acuity   HENT: Denies nasal congestion or sore throat   Respiratory: Denies cough or shortness of breath   Cardiovascular: Denies chest pain or edema   GI: Denies abdominal pain, nausea, vomiting, bloody stools or diarrhea   : Denies dysuria   Integument: Denies rash   Neurologic: As per HPI  MSK: Per above HPI  Endocrine: Denies polyuria or polydipsia   Lymphatic: Denies swollen glands   Psychiatric: Denies depression or anxiety           PHYSICAL EXAM:  Pulse 67   Temp 36.3 °C (97.4 °F) (Oral)   Resp 18   Ht 1.676 m (5' 6\")   Wt 143 kg (316 lb)   BMI 51.00 kg/m²     General: NAD, obese  Psychiatric: appropriate mood & affect.   Cardiovascular: Normal pedal pulses; no LE edema.  Respiratory: Normal rate; unlabored breathing.  Skin: disal erythema and sensitivity bl  Lymphatic: LE lympho and lipedema  NEURO: Alert and appropriate. Speech fluent, conversing appropriately.   Motor:   Rt: HF 5/5, KE 5/5, KF 5/5, DF 5/5, EHL 5/5, PF 5/5.   Lt: HF 5/5, KE 5/5, KF 5/5, DF 5/5, EHL 5/5, PF 5/5.  Sensation:    Light touch: intact in the b/l L3-S1 dermatomes.   PP: intact in the b/l L3-S1 dermatomes  Reflexes:    Right: patellar 2+, achilles 2+,    Left: patellar 2+, achilles 2+,    Babinski's downgoing b/l; no clonus  Gait:wide based slow   MSK:  Inspection reveals no evidence of a pelvic obliqity   Spinal range of motion: Flexion to 90° degrees, Extension of 30 degrees.  There is tenderness over the lumbar paraspinals  Special tests:   Straight leg raise: - bl   Slump test: [- bl]   Facet loading: + on L          Impression: Ms. Wills is a 67 yo F w pmh of obesity, lymphedema, lipedema, hypothyroidism, gerd presenting with back and hip pain. 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 Started therapy for LE before starting therapy for L spine.   Plan:     Back " pain; likely spinal stenosis and facet arthropathy  - xr L spine reviewed personally shows L5/s1 degenerative disc disease,  xr hips w mild oa.  reviewed w patient and on personal review mild hip oa and extensive spondylosis  - water therapy for core rom hep Le strengthening; pending  - discussing swimming for exercise  - gabapentin 300 mg bid titration explained, renal fx borderline, can try extra gabapentin n am or day time. - no nsaids given renal insufficiency    Lympholipedema  - Lymphedema therapycontinue, pending pump and velcro wraps, received jaycee pants    - tsh, bmp wnl except renal insufficiency     fu 10 weeks     Louise Patel MD  Physical Medicine and Rehabilitation

## 2023-10-13 ENCOUNTER — TREATMENT (OUTPATIENT)
Dept: OCCUPATIONAL THERAPY | Facility: CLINIC | Age: 68
End: 2023-10-13
Payer: MEDICARE

## 2023-10-13 DIAGNOSIS — M79.604 PAIN IN BOTH LOWER EXTREMITIES: ICD-10-CM

## 2023-10-13 DIAGNOSIS — I89.0 LYMPHEDEMA OF BOTH LOWER EXTREMITIES: Primary | ICD-10-CM

## 2023-10-13 DIAGNOSIS — R60.9 LIPEDEMA: ICD-10-CM

## 2023-10-13 DIAGNOSIS — M79.605 PAIN IN BOTH LOWER EXTREMITIES: ICD-10-CM

## 2023-10-13 DIAGNOSIS — R29.898 WEAKNESS OF BOTH LOWER EXTREMITIES: ICD-10-CM

## 2023-10-13 PROCEDURE — 97140 MANUAL THERAPY 1/> REGIONS: CPT | Mod: GO

## 2023-10-13 ASSESSMENT — PAIN - FUNCTIONAL ASSESSMENT: PAIN_FUNCTIONAL_ASSESSMENT: 0-10

## 2023-10-13 ASSESSMENT — PAIN SCALES - GENERAL: PAINLEVEL_OUTOF10: 2

## 2023-10-13 NOTE — PROGRESS NOTES
Occupational Therapy    Occupational Therapy Treatment    Name: Ariana Wills  MRN: [id number]  : 1955  Date: 10/13/23  Time Calculation  Start Time: 1006  Stop Time: 1115  Time Calculation (min): 69 min    Assessment:     Plan:  Plan of Care Agreement: Patient    Subjective    General:  OT Last Visit  OT Received On: 10/10/23     Pain Assessment:  Pain Assessment  Pain Assessment: 0-10  Pain Score: 2  Pain Location: Leg  Pain Orientation: Left    Objective    A   Therapy/Activity:      Therapeutic Activity  Therapeutic Activity Performed: Yes  Therapeutic Activity 1: ordered Sigvaris transition this date  Pt arrived with medical compression     Manual Therapy  Manual Therapy Performed: Yes  Manual Therapy Activity 1: see noted  Completed SSB with custom fit foam on anterior/posterior sides of BLEs , cellona applied on BLEs, utilized 8, 10, 12 wrappings with anchored with size H tubigrip.  Applied tubigrip on BL feet and ankle in order to promote shape of LE and volume reduction        Lymphedema Assessment    Lymphedema Assessments:  Lymphedema Assessments: Lower extremities  Right Upper Extremity:     Left Upper Extremity:     UE Skin Appearance/Condition and Girth:     Upper Extremity Evaluation:     Right Lower Extremity:  R Metatarsal (cm): 22.5 cm  R Heel Y Angle: 37.5 cm  R Ankle (cm): 30.5 cm  R 10 cm Above Ankle (cm): 42.5 cm  R 20 cm Above Ankle (cm): 55.5 cm  R 30 cm Above Ankle (cm): 63 cm  R 40 cm Above Ankle (cm): 0 cm  R Knee (cm): 62.5 cm  R 10 cm Above Knee (cm): 77.5 cm  R 20 cm Above Knee (cm): 80.5 cm  R 30 cm Above Knee (cm): 0 cm  R 40 cm Above Knee (cm): 0 cm  R 50 cm Above Knee (cm): 0 cm  Right lower extremity total: 472  Left Lower Extremity:  L Metatarsal (cm): 22 cm  L Heel Y Angle: 37 cm  L Ankle (cm): 32.5 cm  L 10 cm Above Ankle (cm): 44 cm  L 20 cm Above Ankle (cm): 59 cm  L 30 cm Above Ankle (cm): 61.5 cm  L 40 cm Above Ankle (cm): 0 cm  L Knee (cm): 63.5 cm  L 10 cm Above  Knee (cm): 77.5 cm  L 20 cm Above Knee (cm): 80.5 cm  L 30 cm Above Knee (cm): 0 cm  L 40 cm Above Knee (cm): 0 cm  L 50 cm Above Knee (cm): 0 cm  Left Lower extremity total: 477.5  LE Skin Appearance/Condition and Girth:        Prior Function:     Pain Assessment:  Pain Assessment: 0-10  Pain Score: 2  Pain Location: Leg  Pain Orientation: Left  Therapy Precautions:         OP EDUCATION:  Education  Education Provided: Lymphedema, Other  Home Program: Other    Goals:  Active       OT/lymphedema goals        Pt will be I with stating and demonstrating home exercise program in order to improve patients ability to perform self-care, household, work and/or leisure tasks.  (Progressing)       Start:  10/10/23    Expected End:  11/10/23            Pt will be able to perform self care, household, work and or leisure tasks with   0-2/10 pain rating, 100 % of the time  (Progressing)       Start:  10/10/23    Expected End:  11/10/23            Pt will demo 5% cm  of volume reduction with BLE in order for proper fitting of medical compression garments and increase ease in transfers and IADLs  (Progressing)       Start:  10/10/23    Expected End:  11/10/23            Pt will I perform skin care for infection prevention and integrity of skin  (Progressing)       Start:  10/10/23    Expected End:  11/10/23            I with donning/doffing medical compression garments with AE as needed  (Progressing)       Start:  10/10/23    Expected End:  11/10/23

## 2023-10-19 ENCOUNTER — TREATMENT (OUTPATIENT)
Dept: OCCUPATIONAL THERAPY | Facility: CLINIC | Age: 68
End: 2023-10-19
Payer: MEDICARE

## 2023-10-19 DIAGNOSIS — M79.605 PAIN IN BOTH LOWER EXTREMITIES: ICD-10-CM

## 2023-10-19 DIAGNOSIS — M79.604 PAIN IN BOTH LOWER EXTREMITIES: ICD-10-CM

## 2023-10-19 DIAGNOSIS — R60.9 LIPEDEMA: ICD-10-CM

## 2023-10-19 DIAGNOSIS — I89.0 LYMPHEDEMA OF BOTH LOWER EXTREMITIES: Primary | ICD-10-CM

## 2023-10-19 DIAGNOSIS — R29.898 WEAKNESS OF BOTH LOWER EXTREMITIES: ICD-10-CM

## 2023-10-19 PROCEDURE — 97140 MANUAL THERAPY 1/> REGIONS: CPT | Mod: GO

## 2023-10-19 PROCEDURE — 97110 THERAPEUTIC EXERCISES: CPT | Mod: GO

## 2023-10-19 ASSESSMENT — PAIN SCALES - GENERAL: PAINLEVEL_OUTOF10: 3

## 2023-10-19 ASSESSMENT — PAIN - FUNCTIONAL ASSESSMENT: PAIN_FUNCTIONAL_ASSESSMENT: 0-10

## 2023-10-19 NOTE — PROGRESS NOTES
Occupational Therapy    Occupational Therapy Treatment    Name: Ariana Wills  MRN: [id number]  : 1955  Date: 10/19/23  Time Calculation  Start Time:   Stop Time: 161  Time Calculation (min): 53 min    Assessment: tissue pliability increased      Plan:  POC as tolerated        Subjective   General:  OT Last Visit  OT Received On: 10/19/23     Pain Assessment:  Pain Assessment  Pain Assessment: 0-10  Pain Score: 3  Pain Location: Foot  Pain Orientation: Right, Left    Objective       Therapy/Activity: Therapeutic Exercise  Therapeutic Exercise Performed: Yes  Therapeutic Exercise Activity 1: ABCs with ankle with 2# each LE         Manual Therapy  Manual Therapy Performed: Yes  Manual Therapy Activity 1: see note  Completed SSB with custom fit foam on anterior/posterior sides of BLEs , cellona applied on BLEs, utilized 8, 10, 12 wrappings with anchored with size H tubigrip.  Applied tubigrip on BL feet and ankle in order to promote shape of LE and volume reduction        Lymphedema Assessment    Lymphedema Assessments:  Lymphedema Assessments: Lower extremities  Right Upper Extremity:     Left Upper Extremity:     UE Skin Appearance/Condition and Girth:     Upper Extremity Evaluation:     Right Lower Extremity:  R Metatarsal (cm): 23.5 cm  R Heel Y Angle: 37 cm  R Ankle (cm): 31 cm  R 10 cm Above Ankle (cm): 43 cm  R 20 cm Above Ankle (cm): 57 cm  R 30 cm Above Ankle (cm): 63 cm  R 40 cm Above Ankle (cm): 0 cm  R Knee (cm): 62 cm  R 10 cm Above Knee (cm): 78 cm  R 20 cm Above Knee (cm): 79 cm  R 30 cm Above Knee (cm): 0 cm  R 40 cm Above Knee (cm): 0 cm  R 50 cm Above Knee (cm): 0 cm  Right lower extremity total: 473.5 increased 1.5 cm from last session   Left Lower Extremity:  L Metatarsal (cm): 22 cm  L Heel Y Angle: 37 cm  L Ankle (cm): 32 cm  L 10 cm Above Ankle (cm): 45.7 cm  L 20 cm Above Ankle (cm): 59 cm  L 30 cm Above Ankle (cm): 61.5 cm  L 40 cm Above Ankle (cm): 0 cm  L Knee (cm): 63 cm  L 10  cm Above Knee (cm): 77 cm  L 20 cm Above Knee (cm): 81 cm  L 30 cm Above Knee (cm): 0 cm  L 40 cm Above Knee (cm): 0 cm  L 50 cm Above Knee (cm): 0 cm  Left Lower extremity total: 478.2 decreased .7 cm from last session      Prior Function:     Pain Assessment:  Pain Assessment: 0-10  Pain Score: 3  Pain Location: Foot  Pain Orientation: Right, Left  Therapy Precautions:  Precautions Comment: NA    OP EDUCATION:       Goals:  Active       OT/lymphedema goals        Pt will be I with stating and demonstrating home exercise program in order to improve patients ability to perform self-care, household, work and/or leisure tasks.  (Progressing)       Start:  10/10/23    Expected End:  11/10/23            Pt will be able to perform self care, household, work and or leisure tasks with   0-2/10 pain rating, 100 % of the time  (Progressing)       Start:  10/10/23    Expected End:  11/10/23            Pt will demo 5% cm  of volume reduction with BLE in order for proper fitting of medical compression garments and increase ease in transfers and IADLs  (Progressing)       Start:  10/10/23    Expected End:  11/10/23            Pt will I perform skin care for infection prevention and integrity of skin  (Progressing)       Start:  10/10/23    Expected End:  11/10/23            I with donning/doffing medical compression garments with AE as needed  (Progressing)       Start:  10/10/23    Expected End:  11/10/23

## 2023-11-01 ENCOUNTER — APPOINTMENT (OUTPATIENT)
Dept: PHYSICAL MEDICINE AND REHAB | Facility: CLINIC | Age: 68
End: 2023-11-01
Payer: MEDICARE

## 2023-11-01 ENCOUNTER — EVALUATION (OUTPATIENT)
Dept: PHYSICAL THERAPY | Facility: HOSPITAL | Age: 68
End: 2023-11-01
Payer: MEDICARE

## 2023-11-01 DIAGNOSIS — M62.81 WEAKNESS OF TRUNK MUSCULATURE: Primary | ICD-10-CM

## 2023-11-01 DIAGNOSIS — M47.816 LUMBAR SPONDYLOSIS: ICD-10-CM

## 2023-11-01 PROCEDURE — 97162 PT EVAL MOD COMPLEX 30 MIN: CPT | Mod: GP | Performed by: PHYSICAL THERAPIST

## 2023-11-01 PROCEDURE — 97110 THERAPEUTIC EXERCISES: CPT | Mod: GP | Performed by: PHYSICAL THERAPIST

## 2023-11-01 ASSESSMENT — PATIENT HEALTH QUESTIONNAIRE - PHQ9
1. LITTLE INTEREST OR PLEASURE IN DOING THINGS: SEVERAL DAYS
2. FEELING DOWN, DEPRESSED OR HOPELESS: SEVERAL DAYS
SUM OF ALL RESPONSES TO PHQ9 QUESTIONS 1 AND 2: 2
10. IF YOU CHECKED OFF ANY PROBLEMS, HOW DIFFICULT HAVE THESE PROBLEMS MADE IT FOR YOU TO DO YOUR WORK, TAKE CARE OF THINGS AT HOME, OR GET ALONG WITH OTHER PEOPLE: SOMEWHAT DIFFICULT

## 2023-11-01 ASSESSMENT — PAIN - FUNCTIONAL ASSESSMENT: PAIN_FUNCTIONAL_ASSESSMENT: 0-10

## 2023-11-01 ASSESSMENT — ENCOUNTER SYMPTOMS
OCCASIONAL FEELINGS OF UNSTEADINESS: 1
LOSS OF SENSATION IN FEET: 1
DEPRESSION: 1

## 2023-11-01 NOTE — LETTER
November 1, 2023     Patient: Ariana Wills   YOB: 1955   Date of Visit: 11/1/2023       To Whom It May Concern:    It is my medical opinion that Ariana Wills {Work release (duty restriction):90411}.    If you have any questions or concerns, please don't hesitate to call.         Sincerely,        Alicia Chinchilla, PT    CC: No Recipients

## 2023-11-01 NOTE — LETTER
November 1, 2023     Patient: Ariana Wills   YOB: 1955   Date of Visit: 11/1/2023       To Whom it May Concern:    Ariana Wills was seen in my clinic on 11/1/2023. She {Return to school/sport:11187}.    If you have any questions or concerns, please don't hesitate to call.         Sincerely,          Alicia Chinchilla, PT        CC: No Recipients

## 2023-11-01 NOTE — PROGRESS NOTES
Physical Therapy    Physical Therapy Evaluation and Treatment      Patient Name: Ariana Wills  MRN: [id number]  Today's Date: 2023  Time Calculation  Start Time: 1015  Stop Time: 1110  Time Calculation (min): 55 min      Assessment:  PT for core strengthening for lumbar spondylosis. Pt is sedentary, has obesity and swelling in legs, imited LE ROM and impaired mobility related to degenerative changes in lumbar sacral area. Plan to include aquatics and to instruct in home exercises to work towards goal of pain reduction and strengthening.       Plan:  Treatment/Interventions: Aquatic therapy, Education/ Instruction, Manual therapy, Neuromuscular re-education, Therapeutic exercises, Therapeutic activities  PT Plan: Skilled PT  PT Frequency: 2 times per week  Duration: 4-6 week  Onset Date:  (chronic pain)  Certification Period Start Date: 23  Certification Period End Date: 24  Rehab Potential: Good  Plan of Care Agreement: Patient    Current Problem:   1. Weakness of trunk musculature  Follow Up In Physical Therapy      2. Lumbar spondylosis  Referral to Physical Therapy    Follow Up In Physical Therapy          Subjective    General:  General  Reason for Referral: lumbar spondylosis  Referred By: LESLIE Patel MD  Past Medical History Relevant to Rehab: sees OT for lymphedemaMs. Elma is a 69 yo F w pmh of obesity, lymphedema, lipedema, hypothyroidism, gerd presenting with back and hip pain.    Precautions:  Precautions  STEADI Fall Risk Score (The score of 4 or more indicates an increased risk of falling): 10.  1 fall in last 1 year  Medical Precautions:  (hx of basal cell cancer, neuropathy in feet and hands, lypodema tx with OT in [facility].)  Post-Surgical Precautions:  (no prior ortho surgery to spine or hips)      Pain Assessment  Pain Assessment: 0-10  Pain Score:  (moderate LBP 5/10  10/10 when most severe)  Home Livin level home, 1 step to enter     Prior Level of Function:  sedentary. Cane for 1 year to amb  Prior Function Per Pt/Caregiver Report  Level of [place]: Independent with homemaking with ambulation (uses cane to ambulate outside of her home)    Objective   Onset: progressively worse in last 15 years  Location: across low back   Radiation: bilateral, worse on R, R lateral leg numbness ad in the calf  Quality: sharp  Duration: comes and goes  Aggravating factors: standing and walking, standing is worse  walking causes cramping in the legs and across the back  Uses a cane   Alleviating factors: laying down  Severity: [5/10] today, [10/10] most severe  Numbness/Tingling: yes idiopathic neuropathy in feet and hands     General Assessments:    Functional Assessments:  Amb with cane when out of home ( 1 year)  No device in home.  Has 1 step to enter , holds door jam   Standing in place is difficult  Sit to stand intermittent dizziness for 2 years. States she notices if she does not hydrate appropriately the dizziness occurs.   Ears were checked and OK per pt  Showers standing , no grab bar - advise to consider one  1 fall in 1 year and fell forwards in outdoors uneven patio of her home    Extremity/Trunk Assessments:  Trunk AROM Full no pain  Knee AROM 25 -90 supine [place]  Hip flex WNL,   Obese and swelling in legs roscoe  Figure 4 supine left limited, 30 sec 2     MMT   Core abdominal 3/5  Hip flex 4-/5 [place]  Knee ext Rt 4-/5 left 3/5  Knee flex 3/5 or more [place]coe  Limited by pain in assessmet              There are  5 lumbar type vertebral bodies. There is grade 1  anterolisthesis of L3 on L4 and L4 on L5. Vertebral body height and  alignment  are otherwise maintained.  No fracture or dislocation is  evident.  There is severe L3-4 through L5-S1 facet degenerative  change bilaterally. There is severe L5-S1 discogenic degenerative  change. Mild discogenic and facet degenerative changes seen at other levels of the visualized spine. Mild degenerative changes seen of the sacroiliac joints.       Mild degenerative changes seen of the SI joints and the  pubic symphysis. Vascular calcifications are seen over the soft  tissues.    presenting with back and hip pain. 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 Started therapy for LE ([place]  lymph OT)  PT  referral today for core strengthening     Back pain; likely spinal stenosis and facet arthropathy  xr L spine shows L5/s1 degenerative disc disease,  xr hips w mild oa. mild hip oa and extensive spondylosis  plan to include water therapy for core stabilization and strengthening.     Outcome Measures:  Oswestry 46%    Treatments:  Access Code: [email or web address]  URL: [email or web address]  Date: 11/01/2023  Prepared by: Alicia Chinchilla    [facility] [place]  Pelvic tilt 10  5 sec  Pevlic tilt with march 10 x  Supine figure 4 30 sec x 2  Clam shell 10 x while DLS  Pelvic tilt sidelying DLS  Scapular retraction 10   Seated hamstring stretch attempt but pain in knee left  Quad set 10 x  Seated hip rotation 10 x  Short arc quad - home  Sit to stand 3 x with DLS     PHQ -9  score 15 , moderate depression, resources provided , rec discuss with physician for counseling        OP EDUCATION:  Education  Plan of Care Discussed and Agreed Upon: yes  Patient Response to Education: Patient/Caregiver Verbalized Understanding of Information  Education Comment: exercise , avoid pushing through pain    Goals:  Active       PT Problem       PT Goal        Start:  11/01/23    Expected End:  11/15/23       Short term goals ( 2 week)   Patient to be independent with home exercises within pain free range to stretch Lower extremities and to strengthen core abdominals and core stabilization to increase mobility  Patient to demonstrate understanding of what it means to have neutral posture alignment, the use of positioning strategies and body mechanics principles to reduce pain with everyday activities.            PT Goal        Start:  11/01/23     Expected End:  11/29/23       Long term goal 4 weeks:  Patient to gain the functional range of motion necessary in the  lumbar spine to perform activities of daily living with increased ease.    Long term goals 4 weeks : Patient to have reduced pain by 50% or more for increased function and mobility           PT Goal        Start:  11/01/23    Expected End:  11/29/23       Long term goals ( 4 week)   Patient to demonstrate 1/3 MMT strength gain or more in targeted muscle groups and core strength for increased overall mobility   Patient to demonstrate a 4 point or more change in Oswestry to indicate reduced impairment with every day living             PT Goal        Start:  11/01/23    Expected End:  11/29/23       Patient to return to community mobility and household chores and job related activity with pain levels managed using increased awareness of engaging core stabilization and strategies utilized to manage pain.

## 2023-11-02 ENCOUNTER — TREATMENT (OUTPATIENT)
Dept: OCCUPATIONAL THERAPY | Facility: CLINIC | Age: 68
End: 2023-11-02
Payer: MEDICARE

## 2023-11-02 DIAGNOSIS — M79.605 PAIN IN BOTH LOWER EXTREMITIES: ICD-10-CM

## 2023-11-02 DIAGNOSIS — I89.0 LYMPHEDEMA OF BOTH LOWER EXTREMITIES: ICD-10-CM

## 2023-11-02 DIAGNOSIS — R29.898 WEAKNESS OF BOTH LOWER EXTREMITIES: ICD-10-CM

## 2023-11-02 DIAGNOSIS — M79.604 PAIN IN BOTH LOWER EXTREMITIES: ICD-10-CM

## 2023-11-02 DIAGNOSIS — R60.9 LIPEDEMA: ICD-10-CM

## 2023-11-02 PROCEDURE — 97140 MANUAL THERAPY 1/> REGIONS: CPT | Mod: GO

## 2023-11-02 PROCEDURE — 97535 SELF CARE MNGMENT TRAINING: CPT | Mod: GO

## 2023-11-02 ASSESSMENT — ACTIVITIES OF DAILY LIVING (ADL): HOME_MANAGEMENT_TIME_ENTRY: 21

## 2023-11-02 ASSESSMENT — PAIN - FUNCTIONAL ASSESSMENT: PAIN_FUNCTIONAL_ASSESSMENT: 0-10

## 2023-11-02 NOTE — PROGRESS NOTES
"Occupational Therapy    Occupational Therapy Treatment    Name: Ariana Wills  MRN: [id number]  : 1955  Date: 23  Time Calculation  Start Time: 832  Stop Time: 928  Time Calculation (min): 56 min    Assessment: edema increased, garment edu for RLE      Plan:  bring extenders to next session and edu for garment management and TE        Subjective  \"I took the wrappings off about 1 week ago. I have been wearing my biker shorts most days\"    General:  OT Last Visit  OT Received On: 10/19/23     Pain Assessment:  Pain Assessment  Pain Assessment: 0-10    Objective       Therapy/Activity:      Therapeutic Activity  Therapeutic Activity Performed: Yes  Therapeutic Activity 1: Pt arrived with sigvaris XL tall lymphedema garments.  Pt did not have extenders.  edu pt on donning compression liner and velcro wraps    Manual Therapy  Manual Therapy Performed: Yes  Manual Therapy Activity 1: see note  Manual Therapy Activity 2: girth measurements with volume increased on BLEs due to not applying compression on BLes   Completed SSB on LLE due to pt not having extenders with inelastic wrap.  Utilized custom fit foam on anterior/posterior sides of calf , cellona applied utilized 8, 10, 12 wrappings with anchored with size F on foot and size G on calf.   Lymphedema Assessment    Lymphedema Assessments:  Lymphedema Assessments: Lower extremities     Right Lower Extremity:  R Metatarsal (cm): 23 cm  R Heel Y Angle: 36 cm  R Ankle (cm): 32.5 cm  R 10 cm Above Ankle (cm): 46.5 cm  R 20 cm Above Ankle (cm): 59 cm  R 30 cm Above Ankle (cm): 65 cm  R 40 cm Above Ankle (cm): 0 cm  R Knee (cm): 61.3 cm  R 10 cm Above Knee (cm): 77 cm  R 20 cm Above Knee (cm): 79 cm  R 30 cm Above Knee (cm): 0 cm  R 40 cm Above Knee (cm): 0 cm  R 50 cm Above Knee (cm): 0 cm  Right lower extremity total: 479.3  increased 5.8 cm from last session   Left Lower Extremity:  L Metatarsal (cm): 23 cm  L Heel Y Angle: 37 cm  L Ankle (cm): 34 cm  L 10 " cm Above Ankle (cm): 49.5 cm  L 20 cm Above Ankle (cm): 63.5 cm  L 30 cm Above Ankle (cm): 66.5 cm  L 40 cm Above Ankle (cm): 0 cm  L Knee (cm): 63.5 cm  L 10 cm Above Knee (cm): 78 cm  L 20 cm Above Knee (cm): 82.5 cm  L 30 cm Above Knee (cm): 0 cm  L 40 cm Above Knee (cm): 0 cm  L 50 cm Above Knee (cm): 0 cm  Left Lower extremity total: 497.5  increased 19.3 cm from last session     Pain Assessment:  Pain Assessment: 0-10  Therapy Precautions:     OP EDUCATION:  Education  Individual(s) Educated: Patient  Education Provided: Lymphedema  Home Program: Other  Patient/Caregiver Demonstrated Understanding: yes  Plan of Care Discussed and Agreed Upon: yes    Goals:  Active       OT/lymphedema goals        Pt will be I with stating and demonstrating home exercise program in order to improve patients ability to perform self-care, household, work and/or leisure tasks.  (Progressing)       Start:  10/10/23    Expected End:  11/10/23            Pt will be able to perform self care, household, work and or leisure tasks with   0-2/10 pain rating, 100 % of the time  (Progressing)       Start:  10/10/23    Expected End:  11/10/23            Pt will demo 5% cm  of volume reduction with BLE in order for proper fitting of medical compression garments and increase ease in transfers and IADLs  (Progressing)       Start:  10/10/23    Expected End:  11/10/23            Pt will I perform skin care for infection prevention and integrity of skin  (Progressing)       Start:  10/10/23    Expected End:  11/10/23            I with donning/doffing medical compression garments with AE as needed  (Progressing)       Start:  10/10/23    Expected End:  11/10/23

## 2023-11-03 ENCOUNTER — TREATMENT (OUTPATIENT)
Dept: OCCUPATIONAL THERAPY | Facility: CLINIC | Age: 68
End: 2023-11-03
Payer: MEDICARE

## 2023-11-03 DIAGNOSIS — M79.604 PAIN IN BOTH LOWER EXTREMITIES: ICD-10-CM

## 2023-11-03 DIAGNOSIS — R60.9 LIPEDEMA: ICD-10-CM

## 2023-11-03 DIAGNOSIS — R29.898 WEAKNESS OF BOTH LOWER EXTREMITIES: ICD-10-CM

## 2023-11-03 DIAGNOSIS — M79.605 PAIN IN BOTH LOWER EXTREMITIES: ICD-10-CM

## 2023-11-03 DIAGNOSIS — I89.0 LYMPHEDEMA OF BOTH LOWER EXTREMITIES: ICD-10-CM

## 2023-11-03 PROCEDURE — 97535 SELF CARE MNGMENT TRAINING: CPT | Mod: GO

## 2023-11-03 PROCEDURE — 97140 MANUAL THERAPY 1/> REGIONS: CPT | Mod: GO

## 2023-11-03 ASSESSMENT — PAIN SCALES - GENERAL: PAINLEVEL_OUTOF10: 0 - NO PAIN

## 2023-11-03 ASSESSMENT — PAIN - FUNCTIONAL ASSESSMENT: PAIN_FUNCTIONAL_ASSESSMENT: 0-10

## 2023-11-03 ASSESSMENT — ACTIVITIES OF DAILY LIVING (ADL): HOME_MANAGEMENT_TIME_ENTRY: 16

## 2023-11-03 NOTE — PROGRESS NOTES
"Occupational Therapy    Occupational Therapy Treatment    Name: Ariana Wills  MRN: [id number]  : 1955  Date: 23  Time Calculation  Start Time: 09  Stop Time: 957  Time Calculation (min): 57 min    Assessment:  edema reduction, increased tissue pliability,      Plan: continue POC as tolerated, monitor HEP        Subjective   \"the right garment was feeling pretty tight below the knee and I had to move   General:  OT Last Visit  OT Received On: 23     Precautions:     Pain Assessment:  Pain Assessment  Pain Assessment: 0-10  Pain Score: 0 - No pain    Objective    Cognition:        Therapy/Activity:      Therapeutic Activity  Therapeutic Activity Performed: Yes  Therapeutic Activity 1: pt arrived with extender this date with edu for donning/doffing inelatic wraps with proper tension.  pt able to verbalize steps in order to    Manual Therapy  Manual Therapy Performed: Yes  Manual Therapy Activity 1: removed SSB on LLE with good volume reduciton noted.  Pt able to flower LLE with extender on 3 of straps  Manual Therapy Activity 2: girth measurements with volume redcution on BLEs  Manual Therapy Activity 3: edu pt on MLD with sustained touch with skin traction with orientation for hand movements with good lymh flow   Lymphedema Assessment    Lymphedema Assessments:  Lymphedema Assessments: Lower extremities     Right Lower Extremity:  R Metatarsal (cm): 23 cm  R Heel Y Angle: 35.5 cm  R Ankle (cm): 30.5 cm  R 10 cm Above Ankle (cm): 46 cm  R 20 cm Above Ankle (cm): 57 cm  R 30 cm Above Ankle (cm): 61 cm  R 40 cm Above Ankle (cm): 0 cm  R Knee (cm): 63 cm  R 10 cm Above Knee (cm): 77 cm  R 20 cm Above Knee (cm): 79 cm  R 30 cm Above Knee (cm): 0 cm  R 40 cm Above Knee (cm): 0 cm  R 50 cm Above Knee (cm): 0 cm  Right lower extremity total: 472  Left Lower Extremity:  L Metatarsal (cm): 21.7 cm  L Heel Y Angle: 37 cm  L Ankle (cm): 33.4 cm  L 10 cm Above Ankle (cm): 50.3 cm  L 20 cm Above Ankle (cm): " 60.4 cm  L 30 cm Above Ankle (cm): 61.5 cm  L 40 cm Above Ankle (cm): 0 cm  L Knee (cm): 62.3 cm  L 10 cm Above Knee (cm): 77.5 cm  L 20 cm Above Knee (cm): 82.5 cm  L 30 cm Above Knee (cm): 0 cm  L 40 cm Above Knee (cm): 0 cm  L 50 cm Above Knee (cm): 0 cm  Left Lower extremity total: 486.6  LE Skin Appearance/Condition and Girth:  Edema - Fibrotic: good edema reduction with buffalo hump BL sides below knee  Pain Assessment:  Pain Assessment: 0-10  Pain Score: 0 - No pain  Therapy Precautions:       OP EDUCATION:  Education  Education Comment: garment management with proper tension required with use of extenders on LLE, MLD    Goals:  Active       OT/lymphedema goals        Pt will be I with stating and demonstrating home exercise program in order to improve patients ability to perform self-care, household, work and/or leisure tasks.  (Progressing)       Start:  10/10/23    Expected End:  11/10/23            Pt will be able to perform self care, household, work and or leisure tasks with   0-2/10 pain rating, 100 % of the time  (Progressing)       Start:  10/10/23    Expected End:  11/10/23            Pt will demo 5% cm  of volume reduction with BLE in order for proper fitting of medical compression garments and increase ease in transfers and IADLs  (Progressing)       Start:  10/10/23    Expected End:  11/10/23            Pt will I perform skin care for infection prevention and integrity of skin  (Progressing)       Start:  10/10/23    Expected End:  11/10/23            I with donning/doffing medical compression garments with AE as needed  (Progressing)       Start:  10/10/23    Expected End:  11/10/23

## 2023-11-06 ENCOUNTER — TREATMENT (OUTPATIENT)
Dept: PHYSICAL THERAPY | Facility: HOSPITAL | Age: 68
End: 2023-11-06
Payer: MEDICARE

## 2023-11-06 DIAGNOSIS — M62.81 WEAKNESS OF TRUNK MUSCULATURE: ICD-10-CM

## 2023-11-06 DIAGNOSIS — M47.816 LUMBAR SPONDYLOSIS: ICD-10-CM

## 2023-11-06 PROCEDURE — 97113 AQUATIC THERAPY/EXERCISES: CPT | Mod: GP,CQ

## 2023-11-06 ASSESSMENT — PAIN DESCRIPTION - DESCRIPTORS: DESCRIPTORS: ACHING;TENDER

## 2023-11-06 ASSESSMENT — PAIN SCALES - GENERAL: PAINLEVEL_OUTOF10: 2

## 2023-11-06 ASSESSMENT — PAIN - FUNCTIONAL ASSESSMENT: PAIN_FUNCTIONAL_ASSESSMENT: 0-10

## 2023-11-06 NOTE — PROGRESS NOTES
Physical Therapy    Physical Therapy Treatment    Patient Name: Ariana Wills  MRN: [id number]  Today's Date: 11/6/2023  Time Calculation  Start Time: 1545  Stop Time: 1630  Time Calculation (min): 45 min  VISIT 2      Assessment:  PT Assessment  Assessment Comment: good adriel of 1st visit pain decrerased 1/10 overall, VC for posture with amb in water    Plan:  OP PT Plan  PT Plan:  (progress exs as able for increased core strength)    Current Problem  1. Lumbar spondylosis  Follow Up In Physical Therapy      2. Weakness of trunk musculature  Follow Up In Physical Therapy          Subjective  pt reports Lb 2/10 today, her Knee has been bothering her some due to some of the HEP       Precautions  Precautions  Precautions Comment: no change     Pain  Pain Assessment: 0-10  Pain Score: 2  Pain Type: Chronic pain  Pain Location: Back  Pain Orientation: Lower  Pain Radiating Towards: R Hip  Pain Descriptors: Aching, Tender  Pain Frequency: Intermittent    Objective    Initiated aqua     Treatments:     Aquatic Exercise  Aquatic Exercise Performed: Yes  Aquatic Exercise Activity 1: Amb Forw/Retro/Lat X 2 EA  Aquatic Exercise Activity 2: Marching X 2  Aquatic Exercise Activity 3: Squats X 15  Aquatic Exercise Activity 4: TR/HR X 15 EA  Aquatic Exercise Activity 5: SLR X 3 - Lat/Flex/Ext x 15 EA  Aquatic Exercise Activity 6: gastroc asnd HS stretches  Aquatic Exercise Activity 7: Deep water distraction X 5' Flex      Goals:  Active       PT Problem       PT Goal        Start:  11/01/23    Expected End:  11/15/23       Short term goals ( 2 week)   Patient to be independent with home exercises within pain free range to stretch Lower extremities and to strengthen core abdominals and core stabilization to increase mobility  Patient to demonstrate understanding of what it means to have neutral posture alignment, the use of positioning strategies and body mechanics principles to reduce pain with everyday activities.            PT  Goal        Start:  11/01/23    Expected End:  11/29/23       Long term goal 4 weeks:  Patient to gain the functional range of motion necessary in the  lumbar spine to perform activities of daily living with increased ease.    Long term goals 4 weeks : Patient to have reduced pain by 50% or more for increased function and mobility           PT Goal        Start:  11/01/23    Expected End:  11/29/23       Long term goals ( 4 week)   Patient to demonstrate 1/3 MMT strength gain or more in targeted muscle groups and core strength for increased overall mobility   Patient to demonstrate a 4 point or more change in Oswestry to indicate reduced impairment with every day living             PT Goal        Start:  11/01/23    Expected End:  11/29/23       Patient to return to community mobility and household chores and job related activity with pain levels managed using increased awareness of engaging core stabilization and strategies utilized to manage pain.

## 2023-11-10 ENCOUNTER — TREATMENT (OUTPATIENT)
Dept: PHYSICAL THERAPY | Facility: HOSPITAL | Age: 68
End: 2023-11-10
Payer: MEDICARE

## 2023-11-10 DIAGNOSIS — M47.816 LUMBAR SPONDYLOSIS: ICD-10-CM

## 2023-11-10 DIAGNOSIS — M62.81 WEAKNESS OF TRUNK MUSCULATURE: ICD-10-CM

## 2023-11-10 PROCEDURE — 97113 AQUATIC THERAPY/EXERCISES: CPT | Mod: GP,CQ

## 2023-11-10 ASSESSMENT — PAIN - FUNCTIONAL ASSESSMENT: PAIN_FUNCTIONAL_ASSESSMENT: 0-10

## 2023-11-10 ASSESSMENT — PAIN SCALES - GENERAL: PAINLEVEL_OUTOF10: 5 - MODERATE PAIN

## 2023-11-10 ASSESSMENT — PAIN DESCRIPTION - DESCRIPTORS: DESCRIPTORS: ACHING

## 2023-11-10 NOTE — PROGRESS NOTES
Physical Therapy    Physical Therapy Treatment    Patient Name: Ariana Wills  MRN: [id number]  Today's Date: 11/10/2023  Time Calculation  Start Time: 0945  Stop Time: 1030  Time Calculation (min): 45 min  VISIT 3      Assessment:  PT Assessment  Assessment Comment: pain 3/10 after treatment , increased fatigue in [place] LEs, good postural awareness with treatment today    Plan:  OP PT Plan  PT Plan:  (progress exs as able add tray exs next)    Current Problem  1. Lumbar spondylosis  Follow Up In Physical Therapy      2. Weakness of trunk musculature  Follow Up In Physical Therapy        Subjective  pt reports was a little muscle sore after 1st visit but felt good, today increased soreness in R Hip > LB       Precautions  Precautions  Precautions Comment: no change     Pain  Pain Assessment: 0-10  Pain Score: 5 - Moderate pain  Pain Type: Chronic pain  Pain Location: Back  Pain Orientation: Lower  Pain Radiating Towards: R hip  Pain Descriptors: Aching  Pain Frequency: Intermittent    Objective         Treatments:     Aquatic Exercise  Aquatic Exercise Performed: Yes  Aquatic Exercise Activity 1: Amb Forw/Retro/Lat X 2 EA  Aquatic Exercise Activity 2: Marching X 2  Aquatic Exercise Activity 3: Squats X 15  Aquatic Exercise Activity 4: TR/HR X 15 EA  Aquatic Exercise Activity 5: SLR X 3 - Lat/Flex/Ext x 15 EA  Aquatic Exercise Activity 6: gastroc asnd HS stretches  Aquatic Exercise Activity 7: Deep water distraction X 5' Flex  Aquatic Exercise Activity 8: step ups 15 EA 5' sect      Goals:  Active       PT Problem       PT Goal        Start:  11/01/23    Expected End:  11/15/23       Short term goals ( 2 week)   Patient to be independent with home exercises within pain free range to stretch Lower extremities and to strengthen core abdominals and core stabilization to increase mobility  Patient to demonstrate understanding of what it means to have neutral posture alignment, the use of positioning strategies and body  mechanics principles to reduce pain with everyday activities.            PT Goal        Start:  11/01/23    Expected End:  11/29/23       Long term goal 4 weeks:  Patient to gain the functional range of motion necessary in the  lumbar spine to perform activities of daily living with increased ease.    Long term goals 4 weeks : Patient to have reduced pain by 50% or more for increased function and mobility           PT Goal        Start:  11/01/23    Expected End:  11/29/23       Long term goals ( 4 week)   Patient to demonstrate 1/3 MMT strength gain or more in targeted muscle groups and core strength for increased overall mobility   Patient to demonstrate a 4 point or more change in Oswestry to indicate reduced impairment with every day living             PT Goal        Start:  11/01/23    Expected End:  11/29/23       Patient to return to community mobility and household chores and job related activity with pain levels managed using increased awareness of engaging core stabilization and strategies utilized to manage pain.

## 2023-11-14 ENCOUNTER — TREATMENT (OUTPATIENT)
Dept: PHYSICAL THERAPY | Facility: HOSPITAL | Age: 68
End: 2023-11-14
Payer: MEDICARE

## 2023-11-14 DIAGNOSIS — M62.81 WEAKNESS OF TRUNK MUSCULATURE: ICD-10-CM

## 2023-11-14 DIAGNOSIS — M47.816 LUMBAR SPONDYLOSIS: ICD-10-CM

## 2023-11-14 PROCEDURE — 97113 AQUATIC THERAPY/EXERCISES: CPT | Mod: GP,CQ

## 2023-11-14 ASSESSMENT — PAIN DESCRIPTION - DESCRIPTORS: DESCRIPTORS: ACHING;SHARP

## 2023-11-14 ASSESSMENT — PAIN - FUNCTIONAL ASSESSMENT: PAIN_FUNCTIONAL_ASSESSMENT: 0-10

## 2023-11-14 ASSESSMENT — PAIN SCALES - GENERAL: PAINLEVEL_OUTOF10: 6

## 2023-11-14 NOTE — PROGRESS NOTES
Physical Therapy    Physical Therapy Treatment    Patient Name: Ariana Wills  MRN: [id number]  Today's Date: 11/14/2023  Time Calculation  Start Time: 0915  Stop Time: 1000  Time Calculation (min): 45 min  VISIT 4      Assessment:  PT Assessment  Assessment Comment: pain decreased after treatment 3/10 in LB and R Hip, decreased VC for posture with treatment today    Plan:  OP PT Plan  PT Plan:  (add tray exs next visit if able)    Current Problem  1. Lumbar spondylosis  Follow Up In Physical Therapy      2. Weakness of trunk musculature  Follow Up In Physical Therapy          Subjective  pt reports has been a little more sore last day or so       Precautions  Precautions  Precautions Comment: no change     Pain  Pain Assessment: 0-10  Pain Score: 6  Pain Type: Chronic pain  Pain Location: Back  Pain Orientation: Lower, Right  Pain Radiating Towards: R Hip  Pain Descriptors: Aching, Sharp  Pain Frequency: Intermittent    Objective increased reps        Treatments:     Aquatic Exercise  Aquatic Exercise Performed: Yes  Aquatic Exercise Activity 1: Amb Forw/Retro/Lat X 2 EA  Aquatic Exercise Activity 2: Marching X 2  Aquatic Exercise Activity 3: Squats X 20  Aquatic Exercise Activity 4: TR/HR X 20 EA  Aquatic Exercise Activity 5: SLR X 3 - Lat/Flex/Ext x 20 EA  Aquatic Exercise Activity 6: gastroc asnd HS stretches  Aquatic Exercise Activity 7: Deep water distraction X 5' Flex  Aquatic Exercise Activity 8: step ups 15 EA 5' sect      Goals:  Active       PT Problem       PT Goal        Start:  11/01/23    Expected End:  11/15/23       Short term goals ( 2 week)   Patient to be independent with home exercises within pain free range to stretch Lower extremities and to strengthen core abdominals and core stabilization to increase mobility  Patient to demonstrate understanding of what it means to have neutral posture alignment, the use of positioning strategies and body mechanics principles to reduce pain with everyday  activities.            PT Goal        Start:  11/01/23    Expected End:  11/29/23       Long term goal 4 weeks:  Patient to gain the functional range of motion necessary in the  lumbar spine to perform activities of daily living with increased ease.    Long term goals 4 weeks : Patient to have reduced pain by 50% or more for increased function and mobility           PT Goal        Start:  11/01/23    Expected End:  11/29/23       Long term goals ( 4 week)   Patient to demonstrate 1/3 MMT strength gain or more in targeted muscle groups and core strength for increased overall mobility   Patient to demonstrate a 4 point or more change in Oswestry to indicate reduced impairment with every day living             PT Goal        Start:  11/01/23    Expected End:  11/29/23       Patient to return to community mobility and household chores and job related activity with pain levels managed using increased awareness of engaging core stabilization and strategies utilized to manage pain.

## 2023-11-16 ENCOUNTER — TREATMENT (OUTPATIENT)
Dept: PHYSICAL THERAPY | Facility: HOSPITAL | Age: 68
End: 2023-11-16
Payer: MEDICARE

## 2023-11-16 DIAGNOSIS — M62.81 WEAKNESS OF TRUNK MUSCULATURE: ICD-10-CM

## 2023-11-16 DIAGNOSIS — M47.816 LUMBAR SPONDYLOSIS: ICD-10-CM

## 2023-11-16 PROCEDURE — 97113 AQUATIC THERAPY/EXERCISES: CPT | Mod: GP,CQ

## 2023-11-16 ASSESSMENT — PAIN DESCRIPTION - DESCRIPTORS: DESCRIPTORS: ACHING

## 2023-11-16 ASSESSMENT — PAIN SCALES - GENERAL: PAINLEVEL_OUTOF10: 5 - MODERATE PAIN

## 2023-11-16 ASSESSMENT — PAIN - FUNCTIONAL ASSESSMENT: PAIN_FUNCTIONAL_ASSESSMENT: 0-10

## 2023-11-16 NOTE — PROGRESS NOTES
Physical Therapy    Physical Therapy Treatment    Patient Name: Ariana Wills  MRN: [id number]  Today's Date: 11/16/2023  Time Calculation  Start Time: 1345  Stop Time: 1430  Time Calculation (min): 45 min  VISIT 5      Assessment:  PT Assessment  Assessment Comment: 3/10 after treatment, good adriel of all exs, increased VC for posture today with increased soreness    Plan:  OP PT Plan  PT Plan:  (add tray exs if able next visit)    Current Problem  1. Lumbar spondylosis  Follow Up In Physical Therapy      2. Weakness of trunk musculature  Follow Up In Physical Therapy          Subjective  [place] muscle sore and tired today but pain is in LB       Precautions  Precautions  Precautions Comment: no change     Pain  Pain Assessment: 0-10  Pain Score: 5 - Moderate pain  Pain Type: Chronic pain  Pain Location: Back  Pain Orientation: Lower  Pain Descriptors: Aching  Pain Frequency: Intermittent    Objective  did not initiate trays 2* increased soreness        Treatments:     Aquatic Exercise  Aquatic Exercise Performed: Yes  Aquatic Exercise Activity 1: Amb Forw/Retro/Lat X 2 EA  Aquatic Exercise Activity 2: Marching X 2  Aquatic Exercise Activity 3: Squats X 20  Aquatic Exercise Activity 4: TR/HR X 20 EA  Aquatic Exercise Activity 5: SLR X 3 - Lat/Flex/Ext x 20 EA  Aquatic Exercise Activity 6: gastroc asnd HS stretches  Aquatic Exercise Activity 7: Deep water distraction X 5' Flex  Aquatic Exercise Activity 8: step ups 15 EA 5' sect      Goals:  Active       PT Problem       PT Goal        Start:  11/01/23    Expected End:  11/15/23       Short term goals ( 2 week)   Patient to be independent with home exercises within pain free range to stretch Lower extremities and to strengthen core abdominals and core stabilization to increase mobility  Patient to demonstrate understanding of what it means to have neutral posture alignment, the use of positioning strategies and body  mechanics principles to reduce pain with everyday activities.            PT Goal        Start:  11/01/23    Expected End:  11/29/23       Long term goal 4 weeks:  Patient to gain the functional range of motion necessary in the  lumbar spine to perform activities of daily living with increased ease.    Long term goals 4 weeks : Patient to have reduced pain by 50% or more for increased function and mobility           PT Goal        Start:  11/01/23    Expected End:  11/29/23       Long term goals ( 4 week)   Patient to demonstrate 1/3 MMT strength gain or more in targeted muscle groups and core strength for increased overall mobility   Patient to demonstrate a 4 point or more change in Oswestry to indicate reduced impairment with every day living             PT Goal        Start:  11/01/23    Expected End:  11/29/23       Patient to return to community mobility and household chores and job related activity with pain levels managed using increased awareness of engaging core stabilization and strategies utilized to manage pain.

## 2023-11-20 ENCOUNTER — TREATMENT (OUTPATIENT)
Dept: PHYSICAL THERAPY | Facility: HOSPITAL | Age: 68
End: 2023-11-20
Payer: MEDICARE

## 2023-11-20 DIAGNOSIS — M47.816 LUMBAR SPONDYLOSIS: ICD-10-CM

## 2023-11-20 DIAGNOSIS — M62.81 WEAKNESS OF TRUNK MUSCULATURE: ICD-10-CM

## 2023-11-20 DIAGNOSIS — M54.50 LOW BACK PAIN, UNSPECIFIED: ICD-10-CM

## 2023-11-20 DIAGNOSIS — G89.29 OTHER CHRONIC PAIN: ICD-10-CM

## 2023-11-20 PROCEDURE — 97113 AQUATIC THERAPY/EXERCISES: CPT | Mod: GP,CQ | Performed by: PHYSICAL THERAPY ASSISTANT

## 2023-11-20 PROCEDURE — 97110 THERAPEUTIC EXERCISES: CPT | Mod: GP,59 | Performed by: PHYSICAL THERAPIST

## 2023-11-20 RX ORDER — GABAPENTIN 300 MG/1
300 CAPSULE ORAL 2 TIMES DAILY
Qty: 60 CAPSULE | Refills: 3 | Status: SHIPPED | OUTPATIENT
Start: 2023-11-20 | End: 2024-04-09

## 2023-11-20 ASSESSMENT — PAIN - FUNCTIONAL ASSESSMENT
PAIN_FUNCTIONAL_ASSESSMENT: 0-10
PAIN_FUNCTIONAL_ASSESSMENT: 0-10

## 2023-11-20 ASSESSMENT — PAIN SCALES - GENERAL
PAINLEVEL_OUTOF10: 3
PAINLEVEL_OUTOF10: 3

## 2023-11-20 ASSESSMENT — PAIN DESCRIPTION - DESCRIPTORS
DESCRIPTORS: ACHING
DESCRIPTORS: ACHING

## 2023-11-20 NOTE — PROGRESS NOTES
Physical Therapy    Physical Therapy Treatment    Patient Name: Ariana Wills  MRN: [id number]  Today's Date: 11/20/2023  Time Calculation  Start Time: 0130  Stop Time: 0215  Time Calculation (min): 45 min  Visit #6      Assessment:  PT Assessment  Assessment Comment: pt with decreased pain at end of session, She seems to be making progress toward goals set by PT. pt seemed concerned about tolerating  land and pool treatment today.  Plan:  OP PT Plan  Treatment/Interventions: Aquatic therapy, Education/ Instruction, Manual therapy, Neuromuscular re-education, Therapeutic exercises, Therapeutic activities  PT Plan: Skilled PT (modify treatment as needed .)  PT Frequency: 2 times per week    Current Problem  1. Lumbar spondylosis  Follow Up In Physical Therapy      2. Weakness of trunk musculature  Follow Up In Physical Therapy           Subjective.   Pt reported her knees have been bothering her because of lymphedema pumps. She states she feels better after aquatic program but is concerned about doing land and pool together,     Precautions  Precautions  Precautions Comment: no change       Pain  Pain Assessment  Pain Assessment: 0-10  Pain Score: 3  Pain Type: Chronic pain  Pain Location: Back  Pain Orientation: Lower  Pain Radiating Towards: B knees  Pain Descriptors: Aching  Pain Frequency: Intermittent    Objective No changes to aquatic program this date secondary to starting land PT this date                 Treatments:  Aquatic Exercise  Aquatic Exercise Performed: Yes  Aquatic Exercise Activity 1: Amb Forw/Retro/Lat X 2 EA  Aquatic Exercise Activity 2: Marching X 2  Aquatic Exercise Activity 3: Squats X 20  Aquatic Exercise Activity 4: TR/HR X 20 EA  Aquatic Exercise Activity 5: SLR X 3 - Lat/Flex/Ext x 20 EA  Aquatic Exercise Activity 6: gastroc asnd HS stretches  Aquatic Exercise Activity 7: Deep water distraction X 5' Flex  Aquatic Exercise Activity 8: step ups 15 EA 5' sect         Goals:  Active       PT  Problem       PT Goal        Start:  11/01/23    Expected End:  11/15/23       Short term goals ( 2 week)   Patient to be independent with home exercises within pain free range to stretch Lower extremities and to strengthen core abdominals and core stabilization to increase mobility  Patient to demonstrate understanding of what it means to have neutral posture alignment, the use of positioning strategies and body mechanics principles to reduce pain with everyday activities.            PT Goal        Start:  11/01/23    Expected End:  11/29/23       Long term goal 4 weeks:  Patient to gain the functional range of motion necessary in the  lumbar spine to perform activities of daily living with increased ease.    Long term goals 4 weeks : Patient to have reduced pain by 50% or more for increased function and mobility           PT Goal        Start:  11/01/23    Expected End:  11/29/23       Long term goals ( 4 week)   Patient to demonstrate 1/3 MMT strength gain or more in targeted muscle groups and core strength for increased overall mobility   Patient to demonstrate a 4 point or more change in Oswestry to indicate reduced impairment with every day living             PT Goal        Start:  11/01/23    Expected End:  11/29/23       Patient to return to community mobility and household chores and job related activity with pain levels managed using increased awareness of engaging core stabilization and strategies utilized to manage pain.

## 2023-11-20 NOTE — PROGRESS NOTES
Physical Therapy    Physical Therapy    Physical Therapy Treatment      Patient Name: Ariana Wills  MRN: [id number]  Today's Date: 11/20/2023  Visit # 6         Subjective   LBP 3/10,  roscoe knees sore . Wears compression for lymph ( OT [place])  PT aquatics and today trying to do pool and land .         PrecautionsMedical Precautions:  (hx of basal cell cancer, neuropathy in feet and hands, lypodema tx with OT in [place].)  Post-Surgical Precautions:  (no prior ortho surgery to spine or hips)  Treatment/Interventions: Aquatic therapy, Education/ Instruction, Manual therapy, Neuromuscular re-education, Therapeutic exercises, Therapeutic activities  PT Plan: Skilled PT (modify treatment as needed .)  PT Frequency: 2 times per week    Current Problem:   1. Lumbar spondylosis  Follow Up In Physical Therapy      2. Weakness of trunk musculature  Follow Up In Physical Therapy          Pain:  Pain Assessment  Pain Assessment: 0-10  Pain Score: 3  Pain Type: Chronic pain  Pain Location: Back  Pain Orientation: Lower  Pain Radiating Towards: B knees  Pain Descriptors: Aching  Pain Frequency: Intermittent      Objective    PT for core strengthening for lumbar spondylosis. Pt is sedentary, has obesity and swelling in legs, imited LE ROM and impaired mobility related to degenerative changes in lumbar sacral area. Plan to include aquatics and to instruct in home exercises to work towards goal of pain reduction and strengthening     Trunk AROM Full no pain  Knee AROM 25 -90 supine [place]  Hip flex WNL,   Obese and swelling in legs roscoe      Amb with cane when out of home ( 1 year)  No device in home.  Has 1 step to enter , holds door jam   Standing in place is difficult  Sit to stand intermittent dizziness for 2 years. States she notices if she does not hydrate appropriately the dizziness occurs.   Ears were checked and OK per pt  Showers standing , no grab bar - advise to consider one  1 fall in 1 year and fell forwards in outdoors  uneven patio of her home          Treatments:  Aquatics prior to PT today  Nustep L 1  10 min   Calf stretch incline 30 sec x 2  Home ex provided as noted below reviewed this date  SKC 5 x  Pelvic tilt 10  5 sec  Pevlic tilt with march 10 x  Supine figure 4 30 sec x 2  Clam shell 10 x while DLS  Pelvic tilt sidelying DLS  Scapular retraction 10   Seated hamstring stretch attempt but pain in knee left - hold   Quad set 10 x with a rolled towel or  pillow under knee  Seated hip rotation 10 x  Short arc quad - 10 x   Sit to stand 4 x with DLS          Assessment:  Left knee weakness, does not do a full short arc quad.  Quad sets are tolerated better with rolled towel or pillow under knee  Knee discomfort with seated HS stretch - hold off on this   Nustep was comfortable  low impact ex     Plan:  continue aquatics and land based therapy as tolerated    Goals:  Active       PT Problem       PT Goal        Start:  11/01/23    Expected End:  11/15/23       Short term goals ( 2 week)   Patient to be independent with home exercises within pain free range to stretch Lower extremities and to strengthen core abdominals and core stabilization to increase mobility  Patient to demonstrate understanding of what it means to have neutral posture alignment, the use of positioning strategies and body mechanics principles to reduce pain with everyday activities.            PT Goal        Start:  11/01/23    Expected End:  11/29/23       Long term goal 4 weeks:  Patient to gain the functional range of motion necessary in the  lumbar spine to perform activities of daily living with increased ease.    Long term goals 4 weeks : Patient to have reduced pain by 50% or more for increased function and mobility           PT Goal        Start:  11/01/23    Expected End:  11/29/23       Long term goals ( 4 week)   Patient to demonstrate 1/3 MMT strength gain or more in targeted muscle groups and core strength for increased overall mobility    Patient to demonstrate a 4 point or more change in Oswestry to indicate reduced impairment with every day living             PT Goal        Start:  11/01/23    Expected End:  11/29/23       Patient to return to community mobility and household chores and job related activity with pain levels managed using increased awareness of engaging core stabilization and strategies utilized to manage pain.

## 2023-11-22 ENCOUNTER — CLINICAL SUPPORT (OUTPATIENT)
Dept: PHYSICAL THERAPY | Facility: HOSPITAL | Age: 68
End: 2023-11-22
Payer: MEDICARE

## 2023-11-22 ENCOUNTER — TREATMENT (OUTPATIENT)
Dept: PHYSICAL THERAPY | Facility: HOSPITAL | Age: 68
End: 2023-11-22
Payer: MEDICARE

## 2023-11-22 DIAGNOSIS — M47.816 LUMBAR SPONDYLOSIS: ICD-10-CM

## 2023-11-22 DIAGNOSIS — M62.81 WEAKNESS OF TRUNK MUSCULATURE: ICD-10-CM

## 2023-11-22 DIAGNOSIS — M62.81 WEAKNESS OF TRUNK MUSCULATURE: Primary | ICD-10-CM

## 2023-11-22 PROCEDURE — 97113 AQUATIC THERAPY/EXERCISES: CPT | Mod: GP,CQ | Performed by: PHYSICAL THERAPY ASSISTANT

## 2023-11-22 PROCEDURE — 97110 THERAPEUTIC EXERCISES: CPT | Mod: 59,GP,CQ

## 2023-11-22 ASSESSMENT — PAIN DESCRIPTION - DESCRIPTORS
DESCRIPTORS: ACHING
DESCRIPTORS: ACHING

## 2023-11-22 ASSESSMENT — PAIN - FUNCTIONAL ASSESSMENT
PAIN_FUNCTIONAL_ASSESSMENT: 0-10
PAIN_FUNCTIONAL_ASSESSMENT: 0-10

## 2023-11-22 ASSESSMENT — PAIN SCALES - GENERAL
PAINLEVEL_OUTOF10: 7
PAINLEVEL_OUTOF10: 7

## 2023-11-22 NOTE — PROGRESS NOTES
Physical Therapy        Physical Therapy  Physical Therapy Treatment    Patient Name: Ariana Wills  MRN: [id number]  Today's Date: 11/22/2023  Time Calculation  Start Time: 0245  Stop Time: 0330  Time Calculation (min): 45 min      Assessment:  PT Assessment  Evaluation/Treatment Tolerance: Patient tolerated treatment well  Assessment Comment: pt tolerated addition of tray exercises without complaints. pt hs decreased pain and is demonstrating increased core strength.  Plan:  OP PT Plan  Treatment/Interventions: Aquatic therapy, Education/ Instruction, Manual therapy, Neuromuscular re-education, Therapeutic exercises, Therapeutic activities  PT Plan:  (advance program as pt can tolerate)    Current Problem  1. Lumbar spondylosis  Follow Up In Physical Therapy      2. Weakness of trunk musculature  Follow Up In Physical Therapy             Subjective  Ariana reported she feels some better form aquatic PT. She stated she is hoping therapy helps.  Precautions  Precautions  Precautions Comment: no changes       Pain  Pain Assessment  Pain Assessment: 0-10  Pain Score: 7  Pain Type: Chronic pain  Pain Location: Back  Pain Orientation: Lower  Pain Radiating Towards: B Knees  Pain Descriptors: Aching  Pain Frequency: Intermittent    Objective   Initiated tray exercises this session.                Treatments:  Aquatic Exercise  Aquatic Exercise Performed: Yes  Aquatic Exercise Activity 1: Amb Forw/Retro/Lat X 2 EA  Aquatic Exercise Activity 2: Marching X 2  Aquatic Exercise Activity 3: Squats X 20  Aquatic Exercise Activity 4: TR/HR X 20 EA  Aquatic Exercise Activity 5: SLR X 3 - Lat/Flex/Ext x 20 EA  Aquatic Exercise Activity 6: gastroc and HS stretches 4x20 sec  Aquatic Exercise Activity 7: Deep water distraction X 5' Flex  Aquatic Exercise Activity 8: step ups 15 EA 5' sect  Aquatic Exercise Activity 9: tray exercises push/pull and up/down x10 with T1           Goals:  Active       PT Problem       PT Goal         Start:  11/01/23    Expected End:  11/15/23       Short term goals ( 2 week)   Patient to be independent with home exercises within pain free range to stretch Lower extremities and to strengthen core abdominals and core stabilization to increase mobility  Patient to demonstrate understanding of what it means to have neutral posture alignment, the use of positioning strategies and body mechanics principles to reduce pain with everyday activities.            PT Goal        Start:  11/01/23    Expected End:  11/29/23       Long term goal 4 weeks:  Patient to gain the functional range of motion necessary in the  lumbar spine to perform activities of daily living with increased ease.    Long term goals 4 weeks : Patient to have reduced pain by 50% or more for increased function and mobility           PT Goal        Start:  11/01/23    Expected End:  11/29/23       Long term goals ( 4 week)   Patient to demonstrate 1/3 MMT strength gain or more in targeted muscle groups and core strength for increased overall mobility   Patient to demonstrate a 4 point or more change in Oswestry to indicate reduced impairment with every day living             PT Goal        Start:  11/01/23    Expected End:  11/29/23       Patient to return to community mobility and household chores and job related activity with pain levels managed using increased awareness of engaging core stabilization and strategies utilized to manage pain.

## 2023-11-22 NOTE — PROGRESS NOTES
"Physical Therapy    Physical Therapy Treatment    Patient Name: Ariana Wills  MRN: [id number]  Today's Date: 11/22/2023  Time Calculation  Start Time: 1345  Stop Time: 1425  Time Calculation (min): 40 min  VISIT# 7      Current Problem  1. Weakness of trunk musculature  Follow Up In Physical Therapy      2. Lumbar spondylosis  Follow Up In Physical Therapy            Subjective   Pt reports her knees hurts when she wears her thigh high lymphemia wraps.      Precautions  Precautions  Precautions Comment: no changes       Pain  Pain Assessment: 0-10  Pain Score: 7  Pain Type: Chronic pain  Pain Location: Back  Pain Orientation: Lower  Pain Radiating Towards: B knees  Pain Descriptors: Aching  Pain Frequency: Intermittent    Objective   Advanced exercises     Treatments:   Therapeutic Exercise  Therapeutic Exercise Activity 1: Nustep 10' @ L1  Therapeutic Exercise Activity 2: RB DF str 3 x 30\"  Therapeutic Exercise Activity 3: seated HS str 3 x 30\" with 5\" box under foot  Therapeutic Exercise Activity 4: Tband pull downs RTB 2 x 10  Therapeutic Exercise Activity 5: Tband mid rows RTB 2 x 10  Therapeutic Exercise Activity 6: Tband Chops 2 x 10      Assessment:   POC was discussed with PT Alicia.   Pt reports her legs hurt when she stand.       Plan: cont to improve strength and endurance    OP PT Plan  Treatment/Interventions: Aquatic therapy, Education/ Instruction, Manual therapy, Neuromuscular re-education, Therapeutic exercises, Therapeutic activities      Goals:  Active       PT Problem       PT Goal        Start:  11/01/23    Expected End:  11/15/23       Short term goals ( 2 week)   Patient to be independent with home exercises within pain free range to stretch Lower extremities and to strengthen core abdominals and core stabilization to increase mobility  Patient to demonstrate understanding of what it means to have neutral posture alignment, the use of positioning strategies and body mechanics principles to " reduce pain with everyday activities.            PT Goal        Start:  11/01/23    Expected End:  11/29/23       Long term goal 4 weeks:  Patient to gain the functional range of motion necessary in the  lumbar spine to perform activities of daily living with increased ease.    Long term goals 4 weeks : Patient to have reduced pain by 50% or more for increased function and mobility           PT Goal        Start:  11/01/23    Expected End:  11/29/23       Long term goals ( 4 week)   Patient to demonstrate 1/3 MMT strength gain or more in targeted muscle groups and core strength for increased overall mobility   Patient to demonstrate a 4 point or more change in Oswestry to indicate reduced impairment with every day living             PT Goal        Start:  11/01/23    Expected End:  11/29/23       Patient to return to community mobility and household chores and job related activity with pain levels managed using increased awareness of engaging core stabilization and strategies utilized to manage pain.

## 2023-11-22 NOTE — PROGRESS NOTES
Physical Therapy    Physical Therapy    Physical Therapy Treatment      Patient Name: Ariana Wills  MRN: [id number]  Today's Date: 11/22/2023  Visit # ***         Subjective           Precautions       Current Problem:   1. Lumbar spondylosis  Follow Up In Physical Therapy      2. Weakness of trunk musculature  Follow Up In Physical Therapy          Pain:         Objective               Treatments:         Assessment:     Plan:     Goals:  Active       PT Problem       PT Goal        Start:  11/01/23    Expected End:  11/15/23       Short term goals ( 2 week)   Patient to be independent with home exercises within pain free range to stretch Lower extremities and to strengthen core abdominals and core stabilization to increase mobility  Patient to demonstrate understanding of what it means to have neutral posture alignment, the use of positioning strategies and body mechanics principles to reduce pain with everyday activities.            PT Goal        Start:  11/01/23    Expected End:  11/29/23       Long term goal 4 weeks:  Patient to gain the functional range of motion necessary in the  lumbar spine to perform activities of daily living with increased ease.    Long term goals 4 weeks : Patient to have reduced pain by 50% or more for increased function and mobility           PT Goal        Start:  11/01/23    Expected End:  11/29/23       Long term goals ( 4 week)   Patient to demonstrate 1/3 MMT strength gain or more in targeted muscle groups and core strength for increased overall mobility   Patient to demonstrate a 4 point or more change in Oswestry to indicate reduced impairment with every day living             PT Goal        Start:  11/01/23    Expected End:  11/29/23       Patient to return to community mobility and household chores and job related activity with pain levels managed using increased awareness of engaging core stabilization and strategies utilized to manage pain.

## 2023-11-27 ENCOUNTER — TREATMENT (OUTPATIENT)
Dept: PHYSICAL THERAPY | Facility: HOSPITAL | Age: 68
End: 2023-11-27
Payer: MEDICARE

## 2023-11-27 DIAGNOSIS — M47.816 LUMBAR SPONDYLOSIS: ICD-10-CM

## 2023-11-27 DIAGNOSIS — M62.81 WEAKNESS OF TRUNK MUSCULATURE: ICD-10-CM

## 2023-11-27 PROCEDURE — 97110 THERAPEUTIC EXERCISES: CPT | Mod: GP | Performed by: PHYSICAL THERAPIST

## 2023-11-27 PROCEDURE — 97113 AQUATIC THERAPY/EXERCISES: CPT | Mod: GP,CQ | Performed by: PHYSICAL THERAPY ASSISTANT

## 2023-11-27 ASSESSMENT — PAIN - FUNCTIONAL ASSESSMENT
PAIN_FUNCTIONAL_ASSESSMENT: 0-10
PAIN_FUNCTIONAL_ASSESSMENT: 0-10

## 2023-11-27 ASSESSMENT — PAIN SCALES - GENERAL
PAINLEVEL_OUTOF10: 4
PAINLEVEL_OUTOF10: 3

## 2023-11-27 ASSESSMENT — PAIN DESCRIPTION - DESCRIPTORS
DESCRIPTORS: ACHING
DESCRIPTORS: ACHING

## 2023-11-27 NOTE — PROGRESS NOTES
Physical Therapy    Physical Therapy    Physical Therapy Treatment      Patient Name: Ariana Wills  MRN: [id number]  Today's Date: 11/27/2023  Visit # 8         Subjective  LBP 4/10 ( increased from 3/10 after aquatics exercises),  roscoe knees sore . Wears compression for lymph ( OT [place])  PT aquatics and today trying to do pool and land         PrecautionsLBP 3/10,  roscoe knees sore . Wears compression for lymph ( OT [place])  PT aquatics and today trying to do pool and land.hx of basal cell cancer, neuropathy in feet and hands, lypodema tx with OT in [place].)  Post-Surgical Precautions:  (no prior ortho surgery to spine or hips)         Current Problem:   1. Lumbar spondylosis  Follow Up In Physical Therapy      2. Weakness of trunk musculature  Follow Up In Physical Therapy          Pain:  LBP 4/10 at begin and end of Tx on land today         Objective       PT for core strengthening for lumbar spondylosis. Pt is sedentary, has obesity and swelling in legs, imited LE ROM and impaired mobility related to degenerative changes in lumbar sacral area. Plan to include aquatics and to instruct in home exercises to work towards goal of pain reduction and strengthening    spinal stenosis and facet arthropathy  xr L spine shows L5/s1 degenerative disc disease,  xr hips w mild oa. mild hip oa and extensive spondylosis    Trunk AROM Full no pain  Knee AROM 25 -90 supine [place] .  Rt 23-98 [place] after tx  Hip flex WNL,   Obese and swelling in legs roscoe - Has OT for   Amb with cane when out of home ( 1 year)  No device in home or in clinic but uses to park lot.  Has 1 step to enter , holds door jam   Standing in place is difficult  Sit to stand hx of  intermittent dizziness for 2 years. States she has not had dizziness recently. notices if she does not hydrate appropriately the dizziness      Treatments:     Nustep 11.5' @ L1  Supine : Physioball roll Green DKC 10 x 2  Bridge with Physioball 10 x Green  Pelvic tilt 10  5 sec  Pevlic  "tilt with march 10 x  Supine figure 4 30 sec x 2   NP  Clam shell 10 x while DLS  NP  Pelvic tilt sidelying DLS.  NP  Scapular retraction 10   Seated hamstring stretch attempt but pain in knee left - hold off  Quad set 10 x 2 with pillow under knee,  10 x 2 ea  Seated hip rotation 10 x  NP  Short arc quad - home  Sit to stand 3 x with DLS  RB DF str 3 x 30\"  seated HS str 3 x 30\" with 5\" box under foot ( NP)  Tband pull downs GTB 2 x 10  Tband mid rows BTB 2 x 10  Tband Chops Blue  1 x 15     Assessment: pt sees she is able to get down to floor and back up now.  Pt sees her ROM is improving. Challenged by therapy aquatics and land and typically has more pain when she goes to the second part of the visit but prefers to continue scheduling both aquatics and land tx same day.     Plan: continue PT aquatics and land as tolerated    Goals:  Active       PT Problem       PT Goal        Start:  11/01/23    Expected End:  11/15/23       Short term goals ( 2 week)   Patient to be independent with home exercises within pain free range to stretch Lower extremities and to strengthen core abdominals and core stabilization to increase mobility  Patient to demonstrate understanding of what it means to have neutral posture alignment, the use of positioning strategies and body mechanics principles to reduce pain with everyday activities.            PT Goal        Start:  11/01/23    Expected End:  11/29/23       Long term goal 4 weeks:  Patient to gain the functional range of motion necessary in the  lumbar spine to perform activities of daily living with increased ease.    Long term goals 4 weeks : Patient to have reduced pain by 50% or more for increased function and mobility           PT Goal        Start:  11/01/23    Expected End:  11/29/23       Long term goals ( 4 week)   Patient to demonstrate 1/3 MMT strength gain or more in targeted muscle groups and core strength for increased overall mobility   Patient to demonstrate a 4 " point or more change in Oswestry to indicate reduced impairment with every day living             PT Goal        Start:  11/01/23    Expected End:  11/29/23       Patient to return to community mobility and household chores and job related activity with pain levels managed using increased awareness of engaging core stabilization and strategies utilized to manage pain.

## 2023-11-27 NOTE — PROGRESS NOTES
Physical Therapy    Physical Therapy Treatment    Patient Name: Ariana Wills  MRN: [id number]  Today's Date: 11/27/2023  Time Calculation  Start Time: 0100  Stop Time: 0145  Time Calculation (min): 45 min  Visit # 8    Assessment:  PT Assessment  Evaluation/Treatment Tolerance: Patient limited by fatigue  Assessment Comment: pt is motivated and making good progress with aquatic PT. She tolerated increased reps without complaints.  Plan:  OP PT Plan  Treatment/Interventions: Aquatic therapy, Education/ Instruction, Manual therapy, Neuromuscular re-education, Therapeutic exercises, Therapeutic activities  PT Plan:  (make changes to program as pt progresses.)    Current Problem  1. Lumbar spondylosis  Follow Up In Physical Therapy      2. Weakness of trunk musculature  Follow Up In Physical Therapy                  Subjective   pt reported she is feeling better today . Her knees have stopped hurting.   Precautions  Precautions  Precautions Comment: no changes  Vital Signs     Pain  Pain Assessment  Pain Assessment: 0-10  Pain Score: 3 (ranging 2-3)  Pain Type: Chronic pain  Pain Location: Back  Pain Orientation: Lower  Pain Descriptors: Aching  Pain Frequency: Intermittent    Objective    Increased reps  Education provided on technique and posture.       Treatments:  Aquatic Exercise  Aquatic Exercise Performed: Yes  Aquatic Exercise Activity 1: Amb Forw/Retro/Lat X 2 EA  Aquatic Exercise Activity 2: Marching X 2  Aquatic Exercise Activity 3: Squats X 25  Aquatic Exercise Activity 4: TR/HR X 25 EA  Aquatic Exercise Activity 5: SLR X 3 - Lat/Flex/Ext x 20 EA  Aquatic Exercise Activity 6: gastroc and HS [id number] sec  Aquatic Exercise Activity 7: Deep water distraction X 5' Flex  Aquatic Exercise Activity 8: step ups 20 EA 5' sect  Aquatic Exercise Activity 9: tray exercises push/pull and up/down x10 with T1           Goals:  Active       PT Problem       PT Goal        Start:  11/01/23    Expected End:  11/15/23        Short term goals ( 2 week)   Patient to be independent with home exercises within pain free range to stretch Lower extremities and to strengthen core abdominals and core stabilization to increase mobility  Patient to demonstrate understanding of what it means to have neutral posture alignment, the use of positioning strategies and body mechanics principles to reduce pain with everyday activities.            PT Goal        Start:  11/01/23    Expected End:  11/29/23       Long term goal 4 weeks:  Patient to gain the functional range of motion necessary in the  lumbar spine to perform activities of daily living with increased ease.    Long term goals 4 weeks : Patient to have reduced pain by 50% or more for increased function and mobility           PT Goal        Start:  11/01/23    Expected End:  11/29/23       Long term goals ( 4 week)   Patient to demonstrate 1/3 MMT strength gain or more in targeted muscle groups and core strength for increased overall mobility   Patient to demonstrate a 4 point or more change in Oswestry to indicate reduced impairment with every day living             PT Goal        Start:  11/01/23    Expected End:  11/29/23       Patient to return to community mobility and household chores and job related activity with pain levels managed using increased awareness of engaging core stabilization and strategies utilized to manage pain.

## 2023-11-29 ENCOUNTER — TREATMENT (OUTPATIENT)
Dept: PHYSICAL THERAPY | Facility: HOSPITAL | Age: 68
End: 2023-11-29
Payer: MEDICARE

## 2023-11-29 DIAGNOSIS — M62.81 WEAKNESS OF TRUNK MUSCULATURE: ICD-10-CM

## 2023-11-29 DIAGNOSIS — M47.816 LUMBAR SPONDYLOSIS: Primary | ICD-10-CM

## 2023-11-29 DIAGNOSIS — M47.816 LUMBAR SPONDYLOSIS: ICD-10-CM

## 2023-11-29 PROCEDURE — 97113 AQUATIC THERAPY/EXERCISES: CPT | Mod: GP,CQ

## 2023-11-29 PROCEDURE — 97110 THERAPEUTIC EXERCISES: CPT | Mod: GP,59 | Performed by: PHYSICAL THERAPIST

## 2023-11-29 ASSESSMENT — PAIN - FUNCTIONAL ASSESSMENT
PAIN_FUNCTIONAL_ASSESSMENT: 0-10
PAIN_FUNCTIONAL_ASSESSMENT: 0-10

## 2023-11-29 ASSESSMENT — PAIN DESCRIPTION - DESCRIPTORS
DESCRIPTORS: ACHING;DULL
DESCRIPTORS: ACHING;DULL

## 2023-11-29 ASSESSMENT — PAIN SCALES - GENERAL
PAINLEVEL_OUTOF10: 2
PAINLEVEL_OUTOF10: 3

## 2023-11-29 NOTE — PROGRESS NOTES
Physical Therapy    Physical Therapy    Physical Therapy Treatment      Patient Name: Ariana Wills  MRN: [id number]  Today's Date: 11/29/2023  Visit # 9         Subjective  LBP 3/10 ( increased from 2/10 after aquatics exercises),  roscoe knees sore . Wears compression for lymph ( OT [place])  PT aquatics and land . Hopes to continue PT         Precautions  Treatment/Interventions: Aquatic therapy, Education/ Instruction, Manual therapy, Neuromuscular re-education, Therapeutic exercises, Therapeutic activities  PT Plan:  (make changes to program as pt progresses.)    Current Problem:   1. Lumbar spondylosis  Follow Up In Physical Therapy      2. Weakness of trunk musculature  Follow Up In Physical Therapy          Pain:  Pain Assessment  Pain Assessment: 0-10  Pain Score: 3  Pain Location: Back  Pain Orientation: Lower  Pain Descriptors: Aching, Dull  Pain Frequency: Intermittent      Objective        PT for core strengthening for lumbar spondylosis. Pt is sedentary, has obesity and swelling in legs, imited LE ROM and impaired mobility related to degenerative changes in lumbar sacral area. Plan to include aquatics and to instruct in home exercises to work towards goal of pain reduction and strengthening    spinal stenosis and facet arthropathy  xr L spine shows L5/s1 degenerative disc disease,  xr hips w mild oa. mild hip oa and extensive spondylosis    Trunk AROM Full no pain  Knee AROM 25 -90 supine [place] .  Rt 23-98 [place] after tx  Hip flex WNL,   Obese and swelling in legs roscoe - Has OT     Amb with cane when out of home ( 1 year)  No device in home or in clinic but uses to park lot.  Has 1 step to enter , holds door jam   Standing in place is difficult  Sit to stand hx of  intermittent dizziness for 2 years. States she has not had dizziness recently.    Outcome Oswestry 34% improved from 46%          Treatments:  Nustep 11.5' @ L1  Supine : Physioball roll Green DKC 10 x 2  Bridge with Physioball 10 x Green  Pelvic  "tilt 10  5 sec  Pevlic tilt with march 10 x  Supine figure 4 30 sec x 2   NP  Clam shell 10 x while DLS  NP  Pelvic tilt sidelying DLS.  NP  Scapular retraction 10   Seated hamstring stretch attempt but pain in knee left - hold off  Quad set 10 x 2 with pillow under knee,  10 x 2 ea  Seated hip rotation 10 x  NP  Short arc quad - home  Sit to stand 3 x with DLS  RB DF str 3 x 30\"  seated HS str 3 x 30\" with 5\" box under foot ( NP)  Tband pull downs GTB 2 x 10 istting  Tband mid rows BTB 2 x 10 sitting  Tband Chops Blue  1 x 15 sitting  Walking 2 laps , rest sitting , 1 lap rest, and 1 more lap ( each lap is 170 ft).  Fatigue at 2 minutes 14 second.   Adivse to build walking at home on level        Assessment: limited walk before fatigue 2 min 15 seconds.  Building as tolerated to increased strength and reduce pain     Plan: continue PT aquatics and land as tolerated       Goals: met goals 1, 2 , progressing with goals for long term  Active       PT Problem       PT Goal        Start:  11/01/23    Expected End:  11/15/23       Short term goals ( 2 week)   Patient to be independent with home exercises within pain free range to stretch Lower extremities and to strengthen core abdominals and core stabilization to increase mobility  Patient to demonstrate understanding of what it means to have neutral posture alignment, the use of positioning strategies and body mechanics principles to reduce pain with everyday activities.            PT Goal        Start:  11/01/23    Expected End:  11/29/23       Long term goal 4 weeks:  Patient to gain the functional range of motion necessary in the  lumbar spine to perform activities of daily living with increased ease.    Long term goals 4 weeks : Patient to have reduced pain by 50% or more for increased function and mobility           PT Goal        Start:  11/01/23    Expected End:  11/29/23       Long term goals ( 4 week)   Patient to demonstrate 1/3 MMT strength gain or more in " targeted muscle groups and core strength for increased overall mobility   Patient to demonstrate a 4 point or more change in Oswestry to indicate reduced impairment with every day living             PT Goal        Start:  11/01/23    Expected End:  11/29/23       Patient to return to community mobility and household chores and job related activity with pain levels managed using increased awareness of engaging core stabilization and strategies utilized to manage pain.

## 2023-11-29 NOTE — PROGRESS NOTES
Physical Therapy    Physical Therapy Treatment    Patient Name: Ariana Wills  MRN: [id number]  Today's Date: 11/29/2023  Time Calculation  Start Time: 1300  Stop Time: 1345  Time Calculation (min): 45 min  VISIT 9      Assessment:  PT Assessment  Assessment Comment: good adriel of treatment pain after treatment 1/10 improved postural awareness with all exs    Plan:  OP PT Plan  PT Plan:  (add wand exs next visit)    Current Problem  1. Lumbar spondylosis  Follow Up In Physical Therapy      2. Weakness of trunk musculature  Follow Up In Physical Therapy          Subjective  pt reports feeling pretty good last few days overall       Precautions  Precautions  Precautions Comment: no changes     Pain  Pain Assessment: 0-10  Pain Score: 2  Pain Type: Chronic pain  Pain Location: Back  Pain Orientation: Lower  Pain Descriptors: Aching, Dull  Pain Frequency: Intermittent    Objective increased to T2        Treatments:     Aquatic Exercise  Aquatic Exercise Performed: Yes  Aquatic Exercise Activity 1: Amb Forw/Retro/Lat X 2 EA  Aquatic Exercise Activity 2: Marching X 2  Aquatic Exercise Activity 3: Squats X 25  Aquatic Exercise Activity 4: TR/HR X 25 EA  Aquatic Exercise Activity 5: SLR X 3 - Lat/Flex/Ext x 20 EA  Aquatic Exercise Activity 6: gastroc and HS [id number] sec  Aquatic Exercise Activity 7: Deep water distraction X 5' Flex  Aquatic Exercise Activity 8: step ups 20 EA 5' sect  Aquatic Exercise Activity 9: tray exercises push/pull and up/down x10 with T2      Goals:  Active       PT Problem       PT Goal        Start:  11/01/23    Expected End:  11/15/23       Short term goals ( 2 week)   Patient to be independent with home exercises within pain free range to stretch Lower extremities and to strengthen core abdominals and core stabilization to increase mobility  Patient to demonstrate understanding of what it means to have neutral posture alignment, the use of positioning strategies and body mechanics principles  to reduce pain with everyday activities.            PT Goal        Start:  11/01/23    Expected End:  11/29/23       Long term goal 4 weeks:  Patient to gain the functional range of motion necessary in the  lumbar spine to perform activities of daily living with increased ease.    Long term goals 4 weeks : Patient to have reduced pain by 50% or more for increased function and mobility           PT Goal        Start:  11/01/23    Expected End:  11/29/23       Long term goals ( 4 week)   Patient to demonstrate 1/3 MMT strength gain or more in targeted muscle groups and core strength for increased overall mobility   Patient to demonstrate a 4 point or more change in Oswestry to indicate reduced impairment with every day living             PT Goal        Start:  11/01/23    Expected End:  11/29/23       Patient to return to community mobility and household chores and job related activity with pain levels managed using increased awareness of engaging core stabilization and strategies utilized to manage pain.

## 2023-11-30 ENCOUNTER — APPOINTMENT (OUTPATIENT)
Dept: DERMATOLOGY | Facility: CLINIC | Age: 68
End: 2023-11-30
Payer: MEDICARE

## 2023-12-05 ENCOUNTER — TREATMENT (OUTPATIENT)
Dept: PHYSICAL THERAPY | Facility: HOSPITAL | Age: 68
End: 2023-12-05
Payer: MEDICARE

## 2023-12-05 DIAGNOSIS — M47.816 LUMBAR SPONDYLOSIS: ICD-10-CM

## 2023-12-05 DIAGNOSIS — M62.81 WEAKNESS OF TRUNK MUSCULATURE: ICD-10-CM

## 2023-12-05 PROCEDURE — 97113 AQUATIC THERAPY/EXERCISES: CPT | Mod: GP,CQ

## 2023-12-05 PROCEDURE — 97110 THERAPEUTIC EXERCISES: CPT | Mod: GP,59,CQ | Performed by: PHYSICAL THERAPY ASSISTANT

## 2023-12-05 ASSESSMENT — PAIN - FUNCTIONAL ASSESSMENT
PAIN_FUNCTIONAL_ASSESSMENT: 0-10
PAIN_FUNCTIONAL_ASSESSMENT: 0-10

## 2023-12-05 ASSESSMENT — PAIN SCALES - GENERAL
PAINLEVEL_OUTOF10: 2
PAINLEVEL_OUTOF10: 2

## 2023-12-05 ASSESSMENT — PAIN DESCRIPTION - DESCRIPTORS
DESCRIPTORS: ACHING
DESCRIPTORS: ACHING

## 2023-12-05 NOTE — PROGRESS NOTES
Physical Therapy    Physical Therapy Treatment    Patient Name: Ariana Wills  MRN: [id number]  Today's Date: 12/5/2023  Time Calculation  Start Time: 1345  Stop Time: 1430  Time Calculation (min): 45 min  VISIT 10      Assessment:  PT Assessment  Assessment Comment: good adriel of new exs, pain after treatment 1/10, good posture and postural awareness in water today    Plan:  OP PT Plan  PT Plan:  (increase reps next visit if adriel)    Current Problem  1. Lumbar spondylosis  Follow Up In Physical Therapy      2. Weakness of trunk musculature  Follow Up In Physical Therapy          Subjective     Pt reports pain in LB and across to kai Hips    Precautions  Precautions  Precautions Comment: no change     Pain  Pain Assessment: 0-10  Pain Score: 2  Pain Type: Chronic pain  Pain Location: Back  Pain Orientation: Lower  Pain Radiating Towards: ac across LB  Pain Descriptors: Aching  Pain Frequency: Intermittent    Objective added wand exs with Small DB        Treatments:     Aquatic Exercise  Aquatic Exercise Performed: Yes  Aquatic Exercise Activity 1: Amb Forw/Retro/Lat X 2 EA  Aquatic Exercise Activity 2: Marching X 2  Aquatic Exercise Activity 3: Squats X 25  Aquatic Exercise Activity 4: TR/HR X 25 EA  Aquatic Exercise Activity 5: SLR X 3 - Lat/Flex/Ext x 20 EA  Aquatic Exercise Activity 6: gastroc and HS [id number] sec  Aquatic Exercise Activity 7: Deep water distraction X 5' Flex  Aquatic Exercise Activity 8: step ups 20 EA 5' sect  Aquatic Exercise Activity 9: tray exercises push/pull and up/down x10 with T2  Aquatic Exercise Activity 10: wand exs - Shoulder Flex/Ext and ABD/ADD X 15 EA with BDBs      Goals:  Active       PT Problem       PT Goal        Start:  11/01/23    Expected End:  11/15/23       Short term goals ( 2 week)   Patient to be independent with home exercises within pain free range to stretch Lower extremities and to strengthen core abdominals and core stabilization to increase mobility  Patient  to demonstrate understanding of what it means to have neutral posture alignment, the use of positioning strategies and body mechanics principles to reduce pain with everyday activities.            PT Goal        Start:  11/01/23    Expected End:  11/29/23       Long term goal 4 weeks:  Patient to gain the functional range of motion necessary in the  lumbar spine to perform activities of daily living with increased ease.    Long term goals 4 weeks : Patient to have reduced pain by 50% or more for increased function and mobility           PT Goal        Start:  11/01/23    Expected End:  11/29/23       Long term goals ( 4 week)   Patient to demonstrate 1/3 MMT strength gain or more in targeted muscle groups and core strength for increased overall mobility   Patient to demonstrate a 4 point or more change in Oswestry to indicate reduced impairment with every day living             PT Goal        Start:  11/01/23    Expected End:  11/29/23       Patient to return to community mobility and household chores and job related activity with pain levels managed using increased awareness of engaging core stabilization and strategies utilized to manage pain.

## 2023-12-05 NOTE — PROGRESS NOTES
"Physical Therapy    Physical Therapy Treatment    Patient Name: Ariana Wills  MRN: [id number]  Today's Date: 12/5/2023  Time Calculation  Start Time: 1530  Stop Time: 1610  Time Calculation (min): 40 min      Assessment:   Pt fatigued post session. No change in Back and knee pain. Pt states SB LTR felt good on lower back.  Plan:   Continue PT aquatics and land as tolerated    Current Problem  1. Lumbar spondylosis  Follow Up In Physical Therapy      2. Weakness of trunk musculature  Follow Up In Physical Therapy          General   Pt reports back and knees are stiff.        Subjective    Precautions  Precautions  STEADI Fall Risk Score (The score of 4 or more indicates an increased risk of falling): 10  Precautions Comment: no change    Pain  Pain Assessment  Pain Assessment: 0-10  Pain Score: 2  Pain Type: Chronic pain  Pain Location: Back  Pain Orientation: Lower  Pain Radiating Towards: yes (Across hips)  Pain Descriptors: Aching  Pain Frequency: Intermittent    Objective     Treatments:   Nustep 11.5' @ L1  Supine : Physioball roll Green DKC 10 x 2  Physioball LTR 5 sec hold x 10  Bridge with Physioball 10 x Green  Pelvic tilt 10  5 sec  Pevlic tilt with march 10 x  Supine figure 4 30 sec x 2   NP  Clam shell 10 x while DLS  NP  Pelvic tilt sidelying DLS.  NP  Scapular retraction 10   Seated hamstring stretch attempt but pain in knee left   Quad set 10 x 2 with pillow under knee,  10 x 2 ea  Seated hip rotation 10 x  NP  Short arc quad - home  Sit to stand 5 x with DLS  RB DF str 3 x 30\"  seated HS str 3 x 30\" with 5\" box under foot  Tband pull downs GTB 2 x 10 sitting  Tband mid rows BTB 2 x 10 sitting  Tband Chops Blue  1 x 15 sitting  Walking 2 laps , rest sitting , 1 lap rest, and 1 more lap ( each lap is 170 ft).  Fatigue at 2 minutes 14 second.   Adivse to build walking at home on level     OP EDUCATION:       Goals:  Active       PT Problem       PT Goal        Start:  11/01/23    Expected End:  " 11/15/23       Short term goals ( 2 week)   Patient to be independent with home exercises within pain free range to stretch Lower extremities and to strengthen core abdominals and core stabilization to increase mobility  Patient to demonstrate understanding of what it means to have neutral posture alignment, the use of positioning strategies and body mechanics principles to reduce pain with everyday activities.            PT Goal        Start:  11/01/23    Expected End:  11/29/23       Long term goal 4 weeks:  Patient to gain the functional range of motion necessary in the  lumbar spine to perform activities of daily living with increased ease.    Long term goals 4 weeks : Patient to have reduced pain by 50% or more for increased function and mobility           PT Goal        Start:  11/01/23    Expected End:  11/29/23       Long term goals ( 4 week)   Patient to demonstrate 1/3 MMT strength gain or more in targeted muscle groups and core strength for increased overall mobility   Patient to demonstrate a 4 point or more change in Oswestry to indicate reduced impairment with every day living             PT Goal        Start:  11/01/23    Expected End:  11/29/23       Patient to return to community mobility and household chores and job related activity with pain levels managed using increased awareness of engaging core stabilization and strategies utilized to manage pain.

## 2023-12-06 ENCOUNTER — TREATMENT (OUTPATIENT)
Dept: OCCUPATIONAL THERAPY | Facility: CLINIC | Age: 68
End: 2023-12-06
Payer: MEDICARE

## 2023-12-06 DIAGNOSIS — I89.0 LYMPHEDEMA OF BOTH LOWER EXTREMITIES: Primary | ICD-10-CM

## 2023-12-06 DIAGNOSIS — M79.604 PAIN IN BOTH LOWER EXTREMITIES: ICD-10-CM

## 2023-12-06 DIAGNOSIS — M79.605 PAIN IN BOTH LOWER EXTREMITIES: ICD-10-CM

## 2023-12-06 DIAGNOSIS — R60.9 LIPEDEMA: ICD-10-CM

## 2023-12-06 DIAGNOSIS — R29.898 WEAKNESS OF BOTH LOWER EXTREMITIES: ICD-10-CM

## 2023-12-06 PROCEDURE — 97530 THERAPEUTIC ACTIVITIES: CPT | Mod: GO,59

## 2023-12-06 PROCEDURE — 97110 THERAPEUTIC EXERCISES: CPT | Mod: GO

## 2023-12-06 PROCEDURE — 97140 MANUAL THERAPY 1/> REGIONS: CPT | Mod: GO

## 2023-12-06 ASSESSMENT — PAIN - FUNCTIONAL ASSESSMENT: PAIN_FUNCTIONAL_ASSESSMENT: 0-10

## 2023-12-06 ASSESSMENT — PAIN SCALES - GENERAL: PAINLEVEL_OUTOF10: 0 - NO PAIN

## 2023-12-06 NOTE — PROGRESS NOTES
"Occupational Therapy    Occupational Therapy Treatment    Name: Ariana Wills  MRN: [id number]  : 1955  Date: 23  Time Calculation  Start Time: 955  Stop Time:   Time Calculation (min): 57 min    Assessment:  recheck completed, continue lymphedema therapy, muscle fatigue      Plan:  Continue with POC as tolerated, monitor home program, assess new garments,           Subjective   \"The garments are doing good, but I feel like they fold over around the ankle area\"   General:  OT Last Visit  OT Received On: 23     Objective       Therapy/Activity: Therapeutic Exercise  Therapeutic Exercise Performed: Yes  Therapeutic Exercise Activity 1: scifit with interval reisistance in order to promote lymph flow    Therapeutic Activity  Therapeutic Activity Performed: Yes  Therapeutic Activity 1: completed re check. pt arrived with new garments with good fitting. pt completed pump use, reporting not consistent with self mld, will continue therapy to address accuracy with MLD,daytime garments and TE.  Therapeutic Activity 2: disccused with pt to use size E tubigrip over compression undersock in order to promote mild compression prior to donning inelastic wraps    Manual Therapy  Manual Therapy Performed: Yes  Manual Therapy Activity 1: girth measurements with volume reduction on BLEs     Lymphedema Assessment    Lymphedema Assessments:  Lymphedema Assessments: Lower extremities     Right Lower Extremity:  R Metatarsal (cm): 22.3 cm  R Heel Y Angle: 35 cm  R Ankle (cm): 29 cm  R 10 cm Above Ankle (cm): 43.5 cm  R 20 cm Above Ankle (cm): 56 cm  R 30 cm Above Ankle (cm): 61 cm  R 40 cm Above Ankle (cm): 0 cm  R Knee (cm): 63 cm  R 10 cm Above Knee (cm): 77 cm  R 20 cm Above Knee (cm): 82 cm  R 30 cm Above Knee (cm): 0 cm  R 40 cm Above Knee (cm): 0 cm  R 50 cm Above Knee (cm): 0 cm  Right lower extremity total: 468.8  Left Lower Extremity:  L Metatarsal (cm): 22 cm  L Heel Y Angle: 36 cm  L Ankle (cm): 30.5 " cm  L 10 cm Above Ankle (cm): 44 cm  L 20 cm Above Ankle (cm): 58 cm  L 30 cm Above Ankle (cm): 60 cm  L 40 cm Above Ankle (cm): 0 cm  L Knee (cm): 64 cm  L 10 cm Above Knee (cm): 78 cm  L 20 cm Above Knee (cm): 82.5 cm  L 30 cm Above Knee (cm): 0 cm  L 40 cm Above Knee (cm): 0 cm  L 50 cm Above Knee (cm): 0 cm  Left Lower extremity total: 475  LE Skin Appearance/Condition and Girth:  Edema - Fibrotic: decreased fibrosis on BLEs  Edema - Pitting: Y  Hemosiderin Staining: Y  Hyperkeratosis: Y  Hyperpigmentation: Y     Pain Assessment:  Pain Assessment: 0-10  Pain Score: 0 - No pain  Therapy Precautions:  NA   Precautions Comment: cane for ambulation  OP EDUCATION:       Goals:  Active       OT/lymphedema goals        Pt will be I with stating and demonstrating home exercise program in order to improve patients ability to perform self-care, household, work and/or leisure tasks.  (Progressing)       Start:  10/10/23    Expected End:  02/02/24            Pt will be able to perform self care, household, work and or leisure tasks with   0-2/10 pain rating, 100 % of the time  (Progressing)       Start:  10/10/23    Expected End:  02/02/24            Pt will demo 5% cm  of volume reduction with BLE in order for proper fitting of medical compression garments and increase ease in transfers and IADLs  (Progressing)       Start:  10/10/23    Expected End:  02/02/24            Pt will I perform skin care for infection prevention and integrity of skin  (Progressing)       Start:  10/10/23    Expected End:  02/02/24            I with donning/doffing medical compression garments with AE as needed  (Progressing)       Start:  10/10/23    Expected End:  02/02/24

## 2023-12-12 ENCOUNTER — TREATMENT (OUTPATIENT)
Dept: PHYSICAL THERAPY | Facility: HOSPITAL | Age: 68
End: 2023-12-12
Payer: MEDICARE

## 2023-12-12 DIAGNOSIS — M47.816 LUMBAR SPONDYLOSIS: ICD-10-CM

## 2023-12-12 DIAGNOSIS — M62.81 WEAKNESS OF TRUNK MUSCULATURE: ICD-10-CM

## 2023-12-12 DIAGNOSIS — M62.81 WEAKNESS OF TRUNK MUSCULATURE: Primary | ICD-10-CM

## 2023-12-12 PROCEDURE — 97113 AQUATIC THERAPY/EXERCISES: CPT | Mod: GP,CQ

## 2023-12-12 PROCEDURE — 97110 THERAPEUTIC EXERCISES: CPT | Mod: 59,GP | Performed by: PHYSICAL THERAPIST

## 2023-12-12 ASSESSMENT — PAIN DESCRIPTION - DESCRIPTORS: DESCRIPTORS: ACHING

## 2023-12-12 ASSESSMENT — PAIN - FUNCTIONAL ASSESSMENT: PAIN_FUNCTIONAL_ASSESSMENT: 0-10

## 2023-12-12 ASSESSMENT — PAIN SCALES - GENERAL: PAINLEVEL_OUTOF10: 3

## 2023-12-12 NOTE — PROGRESS NOTES
Physical Therapy    Physical Therapy    Physical Therapy Treatment      Patient Name: Ariana Wills  MRN: [id number]  Today's Date: 12/12/2023  Visit #11  Time Calculation  Start Time: 0300  Stop Time: 0340  Time Calculation (min): 40 min      Subjective  4/10 pain LBP , knees sore.           Precautions fall precautions and Precautions  STEADI Fall Risk Score (The score of 4 or more indicates an increased risk of falling): 10.  1 fall in last 1 year  Medical Precautions:  (hx of basal cell cancer, neuropathy in feet and hands, lypodema tx with OT in [facility].)  Post-Surgical Precautions:  (no prior ortho surgery to spine or hips)          Current Problem:   1. Lumbar spondylosis  Follow Up In Physical Therapy      2. Weakness of trunk musculature  Follow Up In Physical Therapy          Pain: 4/10 LBP and Roscoe Knees         Objective       walking is limited by knee and LBP , 2 min 25 sec max.      PT for core strengthening for lumbar spondylosis. Pt is sedentary, has obesity and swelling in legs, imited LE ROM and impaired mobility related to degenerative changes in lumbar sacral area. Plan to include aquatics and to instruct in home exercises to work towards goal of pain reduction and strengthening    spinal stenosis and facet arthropathy  xr L spine shows L5/s1 degenerative disc disease,  xr hips w mild oa. mild hip oa and extensive spondylosis    Last PT recheck:   Trunk AROM Full no pain  Knee AROM 25 -90 supine Roscoe .  Rt 23-98 Roscoe after tx  Hip flex WNL,   Obese and swelling in legs roscoe - Has OT      Amb with cane when out of home ( 1 year)  No device in home or in clinic but uses to park lot.  Has 1 step to enter , holds door jam   Standing in place is difficult  Sit to stand hx of  intermittent dizziness for 2 years. States she has not had dizziness recently.     Outcome Oswestry 34% improved from 46%            Treatments:  Aquatics prior to land  Nustep 11 min' @ L1  Supine : Physioball roll Green DKC 10 x  "2.NP  Bridge with Physioball 10 x Green. NP  Pelvic tilt 10  5 sec. NP  Pevlic tilt with march 10 x. NP  Supine figure 4 30 sec x 2   NP  Clam shell 10 x while DLS  NP  Pelvic tilt sidelying DLS.  NP  Scapular retraction 10   Seated hamstring stretch attempt but pain in knee left - 30 sec x 1  Quad set 10 x 2 with pillow under knee,  10 x 2 ea. NP  Seated hip rotation 10 x  NP  Short arc quad - home  Sit to stand 3 x with DLS  RB DF str 3 x 30\"  Standing hip abd 5 x 2 each leg  seated HS str 3 x 30\" with 5\" box under foot ( NP)  Tband pull downs GTB 2 x 10 istting  Tband mid rows GTB 2 x 10 sitting  Tband Chops Blue  1 x 15 sitting.NP  Walking 2 laps , rest sitting , 1 lap rest, and 1 more lap ( each lap is 170 ft).  Fatigue at 2 minutes 25 sec and needed to sit. Rest. Repeat wal 1 min 45 sec and need to stop due to fatigue , SOB , knee pain  Adivse to build walking at home on level        Assessment: 2 min 25 sec is max walk tolerance before needs to sit. Pt is doing aquatics and land but fatigue , SOB, and knee pain / LBP limit her standing and walking.       Plan: Continue as tolerate with aquatics and land based PT    Goals:  Active       PT Problem       PT Goal        Start:  11/01/23    Expected End:  11/15/23       Short term goals ( 2 week)   Patient to be independent with home exercises within pain free range to stretch Lower extremities and to strengthen core abdominals and core stabilization to increase mobility  Patient to demonstrate understanding of what it means to have neutral posture alignment, the use of positioning strategies and body mechanics principles to reduce pain with everyday activities.            PT Goal        Start:  11/01/23    Expected End:  11/29/23       Long term goal 4 weeks:  Patient to gain the functional range of motion necessary in the  lumbar spine to perform activities of daily living with increased ease.    Long term goals 4 weeks : Patient to have reduced pain by 50% or " more for increased function and mobility           PT Goal        Start:  11/01/23    Expected End:  11/29/23       Long term goals ( 4 week)   Patient to demonstrate 1/3 MMT strength gain or more in targeted muscle groups and core strength for increased overall mobility   Patient to demonstrate a 4 point or more change in Oswestry to indicate reduced impairment with every day living             PT Goal        Start:  11/01/23    Expected End:  11/29/23       Patient to return to community mobility and household chores and job related activity with pain levels managed using increased awareness of engaging core stabilization and strategies utilized to manage pain.

## 2023-12-12 NOTE — PROGRESS NOTES
Physical Therapy    Physical Therapy Treatment    Patient Name: Ariana Wills  MRN: [id number]  Today's Date: 12/12/2023  Time Calculation  Start Time: 1345  Stop Time: 1430  Time Calculation (min): 45 min  VISIT 11      Assessment:  PT Assessment  Assessment Comment: good adriel of all exs pain no increase in LB with all treatment    Plan:  OP PT Plan  PT Plan:  (progress as adriel for i aquatic HEP)    Current Problem  1. Weakness of trunk musculature        2. Lumbar spondylosis            Subjective  pt reports Knee has been sore but LB is doing well       Precautions  Precautions  Precautions Comment: no change     Pain  Pain Assessment: 0-10  Pain Score: 3  Pain Type: Chronic pain  Pain Location: Back  Pain Orientation: Lower  Pain Radiating Towards: across Roscoe Hips  Pain Descriptors: Aching  Pain Frequency: Intermittent    Objective         Treatments:     Aquatic Exercise  Aquatic Exercise Performed: Yes  Aquatic Exercise Activity 1: Amb Forw/Retro/Lat X 2 EA  Aquatic Exercise Activity 2: Marching X 2  Aquatic Exercise Activity 3: Squats X 25  Aquatic Exercise Activity 4: TR/HR X 25 EA  Aquatic Exercise Activity 5: SLR X 3 - Lat/Flex/Ext x 20 EA  Aquatic Exercise Activity 6: gastroc and HS [id number] sec  Aquatic Exercise Activity 7: Deep water distraction X 5' Flex  Aquatic Exercise Activity 8: step ups 20 EA 5' sect  Aquatic Exercise Activity 9: tray exercises push/pull and up/down x10 with T2  Aquatic Exercise Activity 10: wand exs - Shoulder Flex/Ext and ABD/ADD X 15 EA with BDBs      Goals:  Active       PT Problem       PT Goal        Start:  11/01/23    Expected End:  11/15/23       Short term goals ( 2 week)   Patient to be independent with home exercises within pain free range to stretch Lower extremities and to strengthen core abdominals and core stabilization to increase mobility  Patient to demonstrate understanding of what it means to have neutral posture alignment, the use of positioning  strategies and body mechanics principles to reduce pain with everyday activities.            PT Goal        Start:  11/01/23    Expected End:  11/29/23       Long term goal 4 weeks:  Patient to gain the functional range of motion necessary in the  lumbar spine to perform activities of daily living with increased ease.    Long term goals 4 weeks : Patient to have reduced pain by 50% or more for increased function and mobility           PT Goal        Start:  11/01/23    Expected End:  11/29/23       Long term goals ( 4 week)   Patient to demonstrate 1/3 MMT strength gain or more in targeted muscle groups and core strength for increased overall mobility   Patient to demonstrate a 4 point or more change in Oswestry to indicate reduced impairment with every day living             PT Goal        Start:  11/01/23    Expected End:  11/29/23       Patient to return to community mobility and household chores and job related activity with pain levels managed using increased awareness of engaging core stabilization and strategies utilized to manage pain.

## 2023-12-13 ENCOUNTER — TREATMENT (OUTPATIENT)
Dept: OCCUPATIONAL THERAPY | Facility: CLINIC | Age: 68
End: 2023-12-13
Payer: MEDICARE

## 2023-12-13 DIAGNOSIS — R60.9 LIPEDEMA: ICD-10-CM

## 2023-12-13 DIAGNOSIS — I89.0 LYMPHEDEMA OF BOTH LOWER EXTREMITIES: ICD-10-CM

## 2023-12-13 DIAGNOSIS — M79.605 PAIN IN BOTH LOWER EXTREMITIES: ICD-10-CM

## 2023-12-13 DIAGNOSIS — M79.604 PAIN IN BOTH LOWER EXTREMITIES: ICD-10-CM

## 2023-12-13 DIAGNOSIS — R29.898 WEAKNESS OF BOTH LOWER EXTREMITIES: ICD-10-CM

## 2023-12-13 PROCEDURE — 97535 SELF CARE MNGMENT TRAINING: CPT | Mod: GO

## 2023-12-13 PROCEDURE — 97140 MANUAL THERAPY 1/> REGIONS: CPT | Mod: GO

## 2023-12-13 PROCEDURE — 97110 THERAPEUTIC EXERCISES: CPT | Mod: GO

## 2023-12-13 ASSESSMENT — ACTIVITIES OF DAILY LIVING (ADL): HOME_MANAGEMENT_TIME_ENTRY: 22

## 2023-12-13 ASSESSMENT — PAIN SCALES - GENERAL: PAINLEVEL_OUTOF10: 5 - MODERATE PAIN

## 2023-12-13 ASSESSMENT — PAIN DESCRIPTION - DESCRIPTORS: DESCRIPTORS: ACHING;DISCOMFORT

## 2023-12-13 ASSESSMENT — PAIN - FUNCTIONAL ASSESSMENT: PAIN_FUNCTIONAL_ASSESSMENT: 0-10

## 2023-12-13 NOTE — PROGRESS NOTES
Occupational Therapy    Occupational Therapy Treatment    Name: Ariana Wills  MRN: [id number]  : 1955  Date: 23  Time Calculation  Start Time: 1001  Stop Time: 1055  Time Calculation (min): 54 min    Assessment: muscle fatigue, increased pliability,      Plan: Continue with POC as tolerated, monitor home program, assess new garments,           Subjective   General:  OT Last Visit  OT Received On: 23       Objective       Therapy/Activity: Therapeutic Exercise  Therapeutic Exercise Performed: Yes  Therapeutic Exercise Activity 1: PT completed knee extension 20 reps with 2# ankle weights for muscle pumping    Therapeutic Activity  Therapeutic Activity Performed: Yes  Therapeutic Activity 1: ordered juxtafit upper leg essentials for R and L LE to promote lymphatic decongestion as compression jaycee and limiting reduction    Manual Therapy  Manual Therapy Performed: Yes  Manual Therapy Activity 1: girth measurements with marginal changes with LLE and increased on RLE. pt reporting not wearing garments after PT appt due to pain.  edu pt on importance wearing garments in order to promote lymph flow     Lymphedema Assessment    Lymphedema Assessments:  Lymphedema Assessments: Lower extremities     Right Lower Extremity:  R Metatarsal (cm): 22.5 cm  R Heel Y Angle: 35 cm  R Ankle (cm): 29.5 cm  R 10 cm Above Ankle (cm): 44 cm  R 20 cm Above Ankle (cm): 57.5 cm  R 30 cm Above Ankle (cm): 61 cm  R 40 cm Above Ankle (cm): 0 cm  R Knee (cm): 64.3 cm  R 10 cm Above Knee (cm): 79 cm  R 20 cm Above Knee (cm): 84 cm  R 30 cm Above Knee (cm): 0 cm  R 40 cm Above Knee (cm): 0 cm  R 50 cm Above Knee (cm): 0 cm  Right lower extremity total: 476.8  Left Lower Extremity:  L Metatarsal (cm): 23 cm  L Heel Y Angle: 35.5 cm  L Ankle (cm): 30 cm  L 10 cm Above Ankle (cm): 45 cm  L 20 cm Above Ankle (cm): 58 cm  L 30 cm Above Ankle (cm): 59.5 cm  L 40 cm Above Ankle (cm): 0 cm  L Knee (cm): 65 cm  L 10 cm Above Knee (cm):  80 cm  L 20 cm Above Knee (cm): 81 cm  L 30 cm Above Knee (cm): 0 cm  L 40 cm Above Knee (cm): 0 cm  L 50 cm Above Knee (cm): 0 cm  Left Lower extremity total: 477  LE Skin Appearance/Condition and Girth:  Wound: continued edmea noted under the knee persistance     Pain Assessment:  Pain Assessment: 0-10  Pain Score: 5 - Moderate pain  Pain Type: Chronic pain  Pain Location: Knee  Pain Orientation: Right, Left  Pain Descriptors: Aching, Discomfort  Pain Frequency: Intermittent  Therapy Precautions:  Precautions Comment: SC for mobility    OP EDUCATION:   Bring garments in for fitting     Goals:  Active       OT/lymphedema goals        Pt will be I with stating and demonstrating home exercise program in order to improve patients ability to perform self-care, household, work and/or leisure tasks.  (Progressing)       Start:  10/10/23    Expected End:  02/02/24            Pt will be able to perform self care, household, work and or leisure tasks with   0-2/10 pain rating, 100 % of the time  (Progressing)       Start:  10/10/23    Expected End:  02/02/24            Pt will demo 5% cm  of volume reduction with BLE in order for proper fitting of medical compression garments and increase ease in transfers and IADLs  (Progressing)       Start:  10/10/23    Expected End:  02/02/24            Pt will I perform skin care for infection prevention and integrity of skin  (Progressing)       Start:  10/10/23    Expected End:  02/02/24            I with donning/doffing medical compression garments with AE as needed  (Progressing)       Start:  10/10/23    Expected End:  02/02/24

## 2023-12-14 ENCOUNTER — TREATMENT (OUTPATIENT)
Dept: PHYSICAL THERAPY | Facility: HOSPITAL | Age: 68
End: 2023-12-14
Payer: MEDICARE

## 2023-12-14 ENCOUNTER — HOSPITAL ENCOUNTER (OUTPATIENT)
Dept: RADIOLOGY | Facility: HOSPITAL | Age: 68
Discharge: HOME | End: 2023-12-14
Payer: MEDICARE

## 2023-12-14 ENCOUNTER — OFFICE VISIT (OUTPATIENT)
Dept: PHYSICAL MEDICINE AND REHAB | Facility: CLINIC | Age: 68
End: 2023-12-14
Payer: MEDICARE

## 2023-12-14 VITALS — RESPIRATION RATE: 24 BRPM | DIASTOLIC BLOOD PRESSURE: 96 MMHG | HEART RATE: 88 BPM | SYSTOLIC BLOOD PRESSURE: 158 MMHG

## 2023-12-14 DIAGNOSIS — M25.561 CHRONIC PAIN OF BOTH KNEES: ICD-10-CM

## 2023-12-14 DIAGNOSIS — R60.9 LIPEDEMA: ICD-10-CM

## 2023-12-14 DIAGNOSIS — M47.816 LUMBAR SPONDYLOSIS: ICD-10-CM

## 2023-12-14 DIAGNOSIS — M25.561 CHRONIC PAIN OF BOTH KNEES: Primary | ICD-10-CM

## 2023-12-14 DIAGNOSIS — M25.562 CHRONIC PAIN OF BOTH KNEES: ICD-10-CM

## 2023-12-14 DIAGNOSIS — M62.81 WEAKNESS OF TRUNK MUSCULATURE: ICD-10-CM

## 2023-12-14 DIAGNOSIS — G89.29 CHRONIC PAIN OF BOTH KNEES: ICD-10-CM

## 2023-12-14 DIAGNOSIS — I89.0 LYMPHEDEMA: ICD-10-CM

## 2023-12-14 DIAGNOSIS — M25.562 CHRONIC PAIN OF BOTH KNEES: Primary | ICD-10-CM

## 2023-12-14 DIAGNOSIS — G89.29 CHRONIC PAIN OF BOTH KNEES: Primary | ICD-10-CM

## 2023-12-14 PROCEDURE — 97113 AQUATIC THERAPY/EXERCISES: CPT | Mod: GP,CQ

## 2023-12-14 PROCEDURE — 73564 X-RAY EXAM KNEE 4 OR MORE: CPT | Mod: LT,FY

## 2023-12-14 PROCEDURE — 3080F DIAST BP >= 90 MM HG: CPT | Performed by: PHYSICAL MEDICINE & REHABILITATION

## 2023-12-14 PROCEDURE — 73564 X-RAY EXAM KNEE 4 OR MORE: CPT | Mod: RT

## 2023-12-14 PROCEDURE — 97110 THERAPEUTIC EXERCISES: CPT | Mod: 59,GP,CQ

## 2023-12-14 PROCEDURE — 1160F RVW MEDS BY RX/DR IN RCRD: CPT | Performed by: PHYSICAL MEDICINE & REHABILITATION

## 2023-12-14 PROCEDURE — 1159F MED LIST DOCD IN RCRD: CPT | Performed by: PHYSICAL MEDICINE & REHABILITATION

## 2023-12-14 PROCEDURE — 3077F SYST BP >= 140 MM HG: CPT | Performed by: PHYSICAL MEDICINE & REHABILITATION

## 2023-12-14 PROCEDURE — 73564 X-RAY EXAM KNEE 4 OR MORE: CPT | Mod: RIGHT SIDE | Performed by: RADIOLOGY

## 2023-12-14 PROCEDURE — 1036F TOBACCO NON-USER: CPT | Performed by: PHYSICAL MEDICINE & REHABILITATION

## 2023-12-14 PROCEDURE — 1125F AMNT PAIN NOTED PAIN PRSNT: CPT | Performed by: PHYSICAL MEDICINE & REHABILITATION

## 2023-12-14 PROCEDURE — 99214 OFFICE O/P EST MOD 30 MIN: CPT | Performed by: PHYSICAL MEDICINE & REHABILITATION

## 2023-12-14 RX ORDER — LIDOCAINE 50 MG/G
1 PATCH TOPICAL DAILY
Qty: 60 PATCH | Refills: 3 | Status: SHIPPED | OUTPATIENT
Start: 2023-12-14 | End: 2024-02-15

## 2023-12-14 ASSESSMENT — PAIN SCALES - GENERAL
PAINLEVEL_OUTOF10: 5 - MODERATE PAIN
PAINLEVEL: 5

## 2023-12-14 ASSESSMENT — PAIN DESCRIPTION - DESCRIPTORS: DESCRIPTORS: ACHING;SORE

## 2023-12-14 ASSESSMENT — PAIN - FUNCTIONAL ASSESSMENT: PAIN_FUNCTIONAL_ASSESSMENT: 0-10

## 2023-12-14 NOTE — PROGRESS NOTES
Physical Therapy    Physical Therapy Treatment    Patient Name: Ariana Wills  MRN: [id number]  Today's Date: 12/14/2023  Time Calculation  Start Time: 1345  Stop Time: 1430  Time Calculation (min): 45 min  VISIT 12      Assessment:  PT Assessment  Assessment Comment: good adriel of treatment decreaserd pain in Roscoe Knees and decreased tightness in LB pain 3/10 overall    Plan:  OP PT Plan  PT Plan:  (progress as able for decreased pain and core strength)    Current Problem  1. Lumbar spondylosis  Follow Up In Physical Therapy      2. Weakness of trunk musculature  Follow Up In Physical Therapy          Subjective  pt reports Roscoe; Knees and R Hip > LB today       Precautions  Precautions  Precautions Comment: no change     Pain  Pain Assessment: 0-10  Pain Score: 5 - Moderate pain  Pain Type: Chronic pain  Pain Location: Back  Pain Orientation: Lower  Pain Radiating Towards: R Hip and Roscoe Knees  Pain Descriptors: Aching, Sore  Pain Frequency: Intermittent    Objective         Treatments:     Aquatic Exercise  Aquatic Exercise Performed: Yes  Aquatic Exercise Activity 1: Amb Forw/Retro/Lat X 2 EA  Aquatic Exercise Activity 2: Marching X 2  Aquatic Exercise Activity 3: Squats X 25  Aquatic Exercise Activity 4: TR/HR X 25 EA  Aquatic Exercise Activity 5: SLR X 3 - Lat/Flex/Ext x 20 EA  Aquatic Exercise Activity 6: gastroc and HS [id number] sec  Aquatic Exercise Activity 7: Deep water distraction X 5' Flex  Aquatic Exercise Activity 8: step ups 20 EA 5' sect  Aquatic Exercise Activity 9: tray exercises push/pull and up/down x10 with T2  Aquatic Exercise Activity 10: wand exs - Shoulder Flex/Ext and ABD/ADD X 15 EA with BDBs      Goals:  Active       PT Problem       PT Goal        Start:  11/01/23    Expected End:  11/15/23       Short term goals ( 2 week)   Patient to be independent with home exercises within pain free range to stretch Lower extremities and to strengthen core abdominals and core stabilization to  increase mobility  Patient to demonstrate understanding of what it means to have neutral posture alignment, the use of positioning strategies and body mechanics principles to reduce pain with everyday activities.            PT Goal        Start:  11/01/23    Expected End:  11/29/23       Long term goal 4 weeks:  Patient to gain the functional range of motion necessary in the  lumbar spine to perform activities of daily living with increased ease.    Long term goals 4 weeks : Patient to have reduced pain by 50% or more for increased function and mobility           PT Goal        Start:  11/01/23    Expected End:  11/29/23       Long term goals ( 4 week)   Patient to demonstrate 1/3 MMT strength gain or more in targeted muscle groups and core strength for increased overall mobility   Patient to demonstrate a 4 point or more change in Oswestry to indicate reduced impairment with every day living             PT Goal        Start:  11/01/23    Expected End:  11/29/23       Patient to return to community mobility and household chores and job related activity with pain levels managed using increased awareness of engaging core stabilization and strategies utilized to manage pain.

## 2023-12-14 NOTE — PROGRESS NOTES
Physical Therapy    Physical Therapy Treatment    Patient Name: Ariana Wills  MRN: [id number]  : 1955   Today's Date: 2023  Time Calculation  Start Time: 0300  Stop Time: 5  Time Calculation (min): 45 min  Visit #12      Current Problem  1. Lumbar spondylosis  Follow Up In Physical Therapy      2. Weakness of trunk musculature  Follow Up In Physical Therapy            Subjective   Pt states [facility] [employer] [facility] and she felt good in the pool       Precautions       Precautions fall precautions and Precautions  STEADI Fall Risk Score (The score of 4 or more indicates an increased risk of falling): 10.  1 fall in last 1 year  Medical Precautions:  (hx of basal cell cancer, neuropathy in feet and hands, lypodema tx with OT in [facility].)  Post-Surgical Precautions:  (no prior ortho surgery to spine or hips)     Pain   5/10    Objective    See flow sheet     Treatments:  EXERCISES 23      Date       VISIT# #12 # # #    REPS REPS REPS REPS   Nu-step L1 10'      RB stretch  3 x 30 sec              Sitting       T-band ext Green 2x10      chops Green 2x10      rows Green 2x10             Standing hip abd 2x5 each      Standing hip flexion  X5 each             Scapular retract  3 sec x 10      Sit to Stand 2X5 no UE support              DKTC  Green SB 2x10      Bridges 3 sec x 6      Clamshells  X10 each                                          AMB 2 laps at 1:50 minutes, (each lap is 170 ft)                                                               HS stretch               HEP          Assessment:  Pt decreased time with gait for 2 laps in gym. Pt able to tolerate a few [place] exercises today with no increased pain.        Plan:   Cont with POC as tolerated to decrease pain.     OP EDUCATION:       Goals:  Active       PT Problem       PT Goal        Start:  23    Expected End:  11/15/23       Short term goals ( 2 week)   Patient to be independent with home exercises within pain free range  to stretch Lower extremities and to strengthen core abdominals and core stabilization to increase mobility  Patient to demonstrate understanding of what it means to have neutral posture alignment, the use of positioning strategies and body mechanics principles to reduce pain with everyday activities.            PT Goal        Start:  11/01/23    Expected End:  11/29/23       Long term goal 4 weeks:  Patient to gain the functional range of motion necessary in the  lumbar spine to perform activities of daily living with increased ease.    Long term goals 4 weeks : Patient to have reduced pain by 50% or more for increased function and mobility           PT Goal        Start:  11/01/23    Expected End:  11/29/23       Long term goals ( 4 week)   Patient to demonstrate 1/3 MMT strength gain or more in targeted muscle groups and core strength for increased overall mobility   Patient to demonstrate a 4 point or more change in Oswestry to indicate reduced impairment with every day living             PT Goal        Start:  11/01/23    Expected End:  11/29/23       Patient to return to community mobility and household chores and job related activity with pain levels managed using increased awareness of engaging core stabilization and strategies utilized to manage pain.              .

## 2023-12-14 NOTE — PROGRESS NOTES
ref by Dr. Garza for back and leg pain and weakness.      History of Present IllnessPhysical Medicine and Rehabilitation [facility] fu     Chief Complaint:  Low back pain     HPI:  Ms. Wills is a 67 yo F w pmh of obesity, lymphedema, lipedema, hypothyroidism, gerd presenting with back and hip pain.     Recall  Onset: progressively worse in last 15 years  Location: across low back   Radiation: bilateral, worse on R, R lateral leg numbness ad in the calf  Quality: sharp  Duration: comes and goes  Aggravating factors: standing and walking, standing is worse  walking causes cramping in the legs and across the back  Uses a cane   Alleviating factors: laying down  Severity: [5/10] today, [10/10] most severe  Numbness/Tingling: yes idiopathic neuropathy in feet and hands  Bowel or bladder incontinence: yes overactive, bowel depends on food  Pt's current medication regimen includes: advil prn helps but cant take it renal insufficiency  tylen w some relief but less than advil     Treatment to date:  Physical therapy: none  Medications taken to date for this complaint include the following:   as above  Prior injections: 15 years ago not sure   ? nerve blocks      She was last seen in June 2023. at that time we discussed:  Back pain; likely spinal stenosis and facet arthropathy  - xr L spine, xr hips  - PT for core rom hep Le strengthening  - discussing swimming for exercise  - gabapentin 300 mg bid titration explained, renal dosing  - no nsaids given renal insufficiency    Limpholipedema  - Lymphedema therapy; would need custom garments, pump  - tsh, bmp wnl except renal insufficiency    Had lymphedema eval and now pending fu appts.  pain in low back on the right, worse w standing in one spot needs to lean forward   is better.  Gabapentin is helping sleep at night.     Gabapentin 300 mg bid not sure re back,.      She was last seen in August 2023. At that time we discussed:  Back pain; likely spinal stenosis and facet  arthropathy  - xr L spine, xr hips; reviewed w patient and on persnal review mild hip oa and extensive spondylosis  - PT for core rom hep Le strengthening  - discussing swimming for exercise  - gabapentin 300 mg bid titration explained, renal dosing; states overall helping   - no nsaids given renal insufficiency    Lympholipedema  - Lymphedema therapy; would need custom garments, pump  - tsh, bmp wnl except renal insufficiency       Received jaycee pants Sigvaris 10-15 mmhgh. Waiting for velcro wraps.   Waiting for pump.  States ankles are dowb but the front of the shins., Feet are down. Thighs fluctuate.  Started some exercises, takes 24-48 hrs to recover, while she is doing them its ok.      She was last seen in October 2023/   Back pain; likely spinal stenosis and facet arthropathy  - xr L spine reviewed personally shows L5/s1 degenerative disc disease,  xr hips w mild oa.  reviewed w patient and on personal review mild hip oa and extensive spondylosis  - water therapy for core rom hep Le strengthening; pending  - discussing swimming for exercise  - gabapentin 300 mg bid titration explained, renal fx borderline, can try extra gabapentin n am or day time. - no nsaids given renal insufficiency    Lympholipedema  - Lymphedema therapycontinue, pending pump and velcro wraps, received jaycee pants    - tsh, bmp wnl except renal insufficiency    She still goes to lymphedema therapy once a week. She has biker shorts. Velcro garments for below the knees and wears them during the day and then pending thigh velcro.   She has a pump at home does once a day. States pump hurts the knees.  Pain in both knees in doing back therapy.  L spine is much better. More strengthen walking.  Knee pain anterior L on the R general, pain w straightening. Takes tylenol and alternates ibuprofen.  Lidocaine patches takes the edge off.  Continues w gabapentin bid.      Imaging  Reviewed 12/14/23 personally      Xr pelvis 6/2023  FINDINGS:  No  fracture or dislocation is evident.Mild degenerative changes seen  of the hips. Mild degenerative changes seen of the SI joints and the  pubic symphysis. Vascular calcifications are seen over the soft  tissues.     IMPRESSION:  Mild degenerative change of the hips without osseous injury evident.  Xr l spine 6/2023    FINDINGS:  There are  5 lumbar type vertebral bodies. There is grade 1  anterolisthesis of L3 on L4 and L4 on L5. Vertebral body height and  alignment  are otherwise maintained.  No fracture or dislocation is  evident.  There is severe L3-4 through L5-S1 facet degenerative  change bilaterally. There is severe L5-S1 discogenic degenerative  change. Mild discogenic and facet degenerative changes seen at other  levels of the visualized spine. Mild degenerative changes seen of the  sacroiliac joints. Vascular calcifications and surgical changes are  seen over the soft tissues.     IMPRESSION:  1. Multilevel spondylolisthesis.  2. Degenerative change as above.     [PMH]  renal insufficiency  hypothyroidism  HTN  GERD  HLP  PVCs          [SOCHX]  retired  used to work at as a unit clerk  lives w   social alcohol  no smoking or drug use     Review of Systems:  Constitutional: Denies fever or chills, malaise, weight changes.   Eyes: Denies change in visual acuity   HENT: Denies nasal congestion or sore throat   Respiratory: Denies cough or shortness of breath   Cardiovascular: Denies chest pain or edema   GI: Denies abdominal pain, nausea, vomiting, bloody stools or diarrhea   : Denies dysuria   Integument: Denies rash   Neurologic: As per HPI  MSK: Per above HPI  Endocrine: Denies polyuria or polydipsia   Lymphatic: Denies swollen glands   Psychiatric: Denies depression or anxiety           PHYSICAL EXAM:  BP (!) 158/96 (BP Location: Right arm, Patient Position: Sitting)   Pulse 88   Resp 24   LMP  (LMP Unknown)     General: NAD, obese  Psychiatric: appropriate mood & affect.   Cardiovascular:  Normal pedal pulses; no LE edema.  Respiratory: Normal rate; unlabored breathing.  Skin: disal erythema and sensitivity bl  Lymphatic: LE lympho and lipedema  NEURO: Alert and appropriate. Speech fluent, conversing appropriately.   Motor:   Rt: HF 5/5, KE 5/5, KF 5/5, DF 5/5, EHL 5/5, PF 5/5.   Lt: HF 5/5, KE 5/5, KF 5/5, DF 5/5, EHL 5/5, PF 5/5.  Sensation:    Light touch: intact in the b/l L3-S1 dermatomes.   PP: intact in the b/l L3-S1 dermatomes  Reflexes:    Right: patellar 2+, achilles 2+,    Left: patellar 2+, achilles 2+,    Babinski's downgoing b/l; no clonus  Gait:wide based slow   MSK:  Inspection reveals no evidence of a pelvic obliqity   Spinal range of motion: Flexion to 90° degrees, Extension of 30 degrees.  There is tenderness over the lumbar paraspinals  Special tests:   Straight leg raise: - bl   Slump test: [- bl]   Facet loading: + on L     Ttp anterior and medial joint  Flexion 80, ext -10   Antalgic gait       Impression: Ms. Wills is a 67 yo F w pmh of obesity, lymphedema, lipedema, hypothyroidism, gerd presenting with back and hip pain. 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 Made great progress in lymphedema therapy below the knees, but around knees and thighs still significant swelling. Back pain improving w therapy. However now has worsening knee pain because she is overall more active that she has been in years.     Plan:     Back pain; likely spinal stenosis and facet arthropathy  - xr L spine reviewed personally shows L5/s1 degenerative disc disease,  xr hips w mild oa.  reviewed w patient and on personal review mild hip oa and extensive spondylosis  - water therapy for core rom hep Le strengthening; continue and pain improving  - discussing swimming for exercise  - gabapentin 300 mg bid  renal fx borderline, not sure if helping discussed to titrate down to one and off and see if makes a difference   - no nsaids given renal  insufficiency    Lympholipedema  - Lymphedema therapycontinue  - able to do pump daily but not twice a day due to bulkiness  - has below knee velcro wraps, pending thigh wraps  - has received jaycee pants    - tsh, bmp wnl except renal insufficiency    Knee pain  - likely oa and obesity related  - xray knees  - will refer to pain management for fluoroscopy guided steroid joint injections     fu 8 weeks     Louise Patel MD  Physical Medicine and Rehabilitation

## 2023-12-19 ENCOUNTER — TREATMENT (OUTPATIENT)
Dept: PHYSICAL THERAPY | Facility: HOSPITAL | Age: 68
End: 2023-12-19
Payer: MEDICARE

## 2023-12-19 DIAGNOSIS — M47.816 LUMBAR SPONDYLOSIS: ICD-10-CM

## 2023-12-19 DIAGNOSIS — M62.81 WEAKNESS OF TRUNK MUSCULATURE: ICD-10-CM

## 2023-12-19 PROCEDURE — 97113 AQUATIC THERAPY/EXERCISES: CPT | Mod: GP,CQ

## 2023-12-19 PROCEDURE — 97110 THERAPEUTIC EXERCISES: CPT | Mod: GP,CQ,59 | Performed by: PHYSICAL THERAPY ASSISTANT

## 2023-12-19 ASSESSMENT — PAIN SCALES - GENERAL
PAINLEVEL_OUTOF10: 5 - MODERATE PAIN
PAINLEVEL_OUTOF10: 2

## 2023-12-19 ASSESSMENT — PAIN DESCRIPTION - DESCRIPTORS
DESCRIPTORS: ACHING
DESCRIPTORS: STABBING;ACHING
DESCRIPTORS: ACHING;TIGHTNESS
DESCRIPTORS: SORE

## 2023-12-19 ASSESSMENT — PAIN - FUNCTIONAL ASSESSMENT
PAIN_FUNCTIONAL_ASSESSMENT: 0-10
PAIN_FUNCTIONAL_ASSESSMENT: 0-10

## 2023-12-19 NOTE — PROGRESS NOTES
Physical Therapy    Physical Therapy Treatment    Patient Name: Ariana Wills  MRN: [id number]  Today's Date: 12/19/2023  Time Calculation  Start Time: 1305  Stop Time: 1350  Time Calculation (min): 45 min  VISIT 13      Assessment:  PT Assessment  Assessment Comment: good adriel of treatment on LB pain after deep water 1/10 in LB, no increased pain in Roscoe Knees after all treatment    Plan:  OP PT Plan  PT Plan:  (progress as adriel for increased DLS)    Current Problem  1. Lumbar spondylosis  Follow Up In Physical Therapy      2. Weakness of trunk musculature  Follow Up In Physical Therapy          Subjective  pt reports doing baking and shopping at home and Roscoe Knee pain has increased LB pain is staying low       Precautions  Precautions  Precautions Comment: no change     Pain  Pain Assessment: 0-10  Pain Score: 2  Pain Type: Chronic pain  Pain Location: Back  Pain Orientation: Lower  Pain Descriptors: Sore  Pain Frequency: Intermittent    Objective add BDB to amb        Treatments:     Aquatic Exercise  Aquatic Exercise Performed: Yes  Aquatic Exercise Activity 1: Amb Forw/Retro/Lat X 2 EA (with BDB)  Aquatic Exercise Activity 2: Marching X 2  Aquatic Exercise Activity 3: Squats X 25  Aquatic Exercise Activity 4: TR/HR X 25 EA  Aquatic Exercise Activity 5: SLR X 3 - Lat/Flex/Ext x 20 EA  Aquatic Exercise Activity 6: gastroc and HS [id number] sec  Aquatic Exercise Activity 7: Deep water distraction X 5' Flex  Aquatic Exercise Activity 8: step ups 20 EA 5' sect  Aquatic Exercise Activity 9: tray exercises push/pull and up/down x10 with T2  Aquatic Exercise Activity 10: wand exs - Shoulder Flex/Ext and ABD/ADD X 15 EA with BDBs      Goals:  Active       PT Problem       PT Goal        Start:  11/01/23    Expected End:  11/15/23       Short term goals ( 2 week)   Patient to be independent with home exercises within pain free range to stretch Lower extremities and to strengthen core abdominals and core stabilization  to increase mobility  Patient to demonstrate understanding of what it means to have neutral posture alignment, the use of positioning strategies and body mechanics principles to reduce pain with everyday activities.            PT Goal        Start:  11/01/23    Expected End:  11/29/23       Long term goal 4 weeks:  Patient to gain the functional range of motion necessary in the  lumbar spine to perform activities of daily living with increased ease.    Long term goals 4 weeks : Patient to have reduced pain by 50% or more for increased function and mobility           PT Goal        Start:  11/01/23    Expected End:  11/29/23       Long term goals ( 4 week)   Patient to demonstrate 1/3 MMT strength gain or more in targeted muscle groups and core strength for increased overall mobility   Patient to demonstrate a 4 point or more change in Oswestry to indicate reduced impairment with every day living             PT Goal        Start:  11/01/23    Expected End:  11/29/23       Patient to return to community mobility and household chores and job related activity with pain levels managed using increased awareness of engaging core stabilization and strategies utilized to manage pain.

## 2023-12-19 NOTE — PROGRESS NOTES
"Physical Therapy    Physical Therapy Treatment    Patient Name: Ariana Wills  MRN: [id number]  : 1955   Today's Date: 2023  Time Calculation  Start Time: 205  Stop Time: 246  Time Calculation (min): 41 min  Visit #13      Current Problem  1. Lumbar spondylosis  Follow Up In Physical Therapy      2. Weakness of trunk musculature  Follow Up In Physical Therapy            Subjective   Pt states she came from aqua therapy and she is hurting in Both knees and back. States her PCP is trying to get her injections in knees.        Precautions  Precautions  Precautions Comment: no change    Precautions fall precautions and Precautions  STEADI Fall Risk Score (The score of 4 or more indicates an increased risk of falling): 10.  1 fall in last 1 year  Medical Precautions:  (hx of basal cell cancer, neuropathy in feet and hands, lypodema tx with OT in [facility].)  Post-Surgical Precautions:  (no prior ortho surgery to spine or hips)     Pain  Pain Assessment: 0-10  Pain Score: 5 - Moderate pain  Pain Type: Chronic pain  Pain Location: Back  Pain Orientation: Lower  Pain Descriptors: Aching  Pain Frequency: Intermittent  5/10 Left>R knee  Stabbing, aching      Objective    See flow sheet     Treatments:  EXERCISES 23     Date       VISIT# #12 #13 # #    REPS REPS REPS REPS   Nu-step L1 10' L1 x 10     RB stretch  3 x 30 sec 3 x 30\"             Sitting  Seated     T-band ext Green 2x10 Green 2 x 10     chops Green 2x10 Green 2 x 10     rows Green 2x10 Green 2 x 10            Standing hip abd 2x5 each      Standing hip flexion  X5 each X 5 each            Scapular retract  3 sec x 10      Sit to Stand 2X5 no UE support              DKTC  Green SB 2x10 Green 2 x 10     Bridges 3 sec x 6 3\" x 5     Clamshells  X10 each  X 10 each                                        AMB 2 laps at 1:50 minutes, (each lap is 170 ft)  2 laps 2:01                                                             HS stretch      "          HEP          Assessment:  Pt challenged with session this date, fatigued from aquatics.        Plan:   Cont with POC as tolerated to decrease pain.     OP EDUCATION:       Goals:  Active       PT Problem       PT Goal        Start:  11/01/23    Expected End:  11/15/23       Short term goals ( 2 week)   Patient to be independent with home exercises within pain free range to stretch Lower extremities and to strengthen core abdominals and core stabilization to increase mobility  Patient to demonstrate understanding of what it means to have neutral posture alignment, the use of positioning strategies and body mechanics principles to reduce pain with everyday activities.            PT Goal        Start:  11/01/23    Expected End:  11/29/23       Long term goal 4 weeks:  Patient to gain the functional range of motion necessary in the  lumbar spine to perform activities of daily living with increased ease.    Long term goals 4 weeks : Patient to have reduced pain by 50% or more for increased function and mobility           PT Goal        Start:  11/01/23    Expected End:  11/29/23       Long term goals ( 4 week)   Patient to demonstrate 1/3 MMT strength gain or more in targeted muscle groups and core strength for increased overall mobility   Patient to demonstrate a 4 point or more change in Oswestry to indicate reduced impairment with every day living             PT Goal        Start:  11/01/23    Expected End:  11/29/23       Patient to return to community mobility and household chores and job related activity with pain levels managed using increased awareness of engaging core stabilization and strategies utilized to manage pain.              .

## 2023-12-20 ENCOUNTER — TREATMENT (OUTPATIENT)
Dept: OCCUPATIONAL THERAPY | Facility: CLINIC | Age: 68
End: 2023-12-20
Payer: MEDICARE

## 2023-12-20 DIAGNOSIS — I89.0 LYMPHEDEMA OF BOTH LOWER EXTREMITIES: Primary | ICD-10-CM

## 2023-12-20 DIAGNOSIS — M79.605 PAIN IN BOTH LOWER EXTREMITIES: ICD-10-CM

## 2023-12-20 DIAGNOSIS — R60.9 LIPEDEMA: ICD-10-CM

## 2023-12-20 DIAGNOSIS — R29.898 WEAKNESS OF BOTH LOWER EXTREMITIES: ICD-10-CM

## 2023-12-20 DIAGNOSIS — M79.604 PAIN IN BOTH LOWER EXTREMITIES: ICD-10-CM

## 2023-12-20 PROCEDURE — 97140 MANUAL THERAPY 1/> REGIONS: CPT | Mod: GO

## 2023-12-20 PROCEDURE — 97535 SELF CARE MNGMENT TRAINING: CPT | Mod: GO

## 2023-12-20 ASSESSMENT — PAIN SCALES - GENERAL: PAINLEVEL_OUTOF10: 2

## 2023-12-20 ASSESSMENT — ACTIVITIES OF DAILY LIVING (ADL): HOME_MANAGEMENT_TIME_ENTRY: 31

## 2023-12-20 ASSESSMENT — PAIN DESCRIPTION - DESCRIPTORS: DESCRIPTORS: DISCOMFORT;CRAMPING

## 2023-12-20 ASSESSMENT — PAIN - FUNCTIONAL ASSESSMENT: PAIN_FUNCTIONAL_ASSESSMENT: 0-10

## 2023-12-20 NOTE — PROGRESS NOTES
"Occupational Therapy    Occupational Therapy Treatment    Name: Ariana Wills  MRN: [id number]  : 1955  Date: 23  Time Calculation  Start Time: 1111  Stop Time: 1208  Time Calculation (min): 57 min    Assessment: \" my garments are not for the upper legs.  I noticed my legs are feeling a little lighter this time\"       Plan: Continue with POC as tolerated, monitor home program, assess new garments,           Subjective   General:        Precautions:  Precautions Comment: SC for mobility as needed  Pain Assessment:  Pain Assessment  Pain Assessment: 0-10  Pain Score: 2  Pain Type: Chronic pain  Pain Location: Back  Pain Orientation: Lower  Pain Descriptors: Discomfort, Cramping  Pain Frequency: Intermittent    Objective    Therapy/Activity:      Therapeutic Activity  Therapeutic Activity Performed: Yes  Therapeutic Activity 1: completed custom measurements for [employer] flat kntt class I closed toe.  Therapeutic Activity 2: Pt reporting receiving wrong garments shipped.  networked with [employer]    Manual Therapy  Manual Therapy Performed: Yes  Manual Therapy Activity 1: girth measurements with BLEs with volume reduciton noted.     Lymphedema Assessment    Lymphedema Assessments:  Lymphedema Assessments: Lower extremities     Right Lower Extremity:  R Metatarsal (cm): 22.5 cm  R Heel Y Angle: 35.5 cm  R Ankle (cm): 29.4 cm  R 10 cm Above Ankle (cm): 42 cm  R 20 cm Above Ankle (cm): 55.4 cm  R 30 cm Above Ankle (cm): 61 cm  R 40 cm Above Ankle (cm): 0 cm  R Knee (cm): 62 cm  R 10 cm Above Knee (cm): 78 cm  R 20 cm Above Knee (cm): 82 cm  R 30 cm Above Knee (cm): 0 cm  R 40 cm Above Knee (cm): 0 cm  R 50 cm Above Knee (cm): 0 cm  Right lower extremity total: 467.8  Left Lower Extremity:  L Metatarsal (cm): 22 cm  L Heel Y Angle: 36.5 cm  L Ankle (cm): 30 cm  L 10 cm Above Ankle (cm): 44 cm  L 20 cm Above Ankle (cm): 58 cm  L 30 cm Above Ankle (cm): 58 cm  L 40 cm Above Ankle (cm): 0 cm  L Knee (cm): 63 cm  L " 10 cm Above Knee (cm): 80 cm  L 20 cm Above Knee (cm): 83 cm  L 30 cm Above Knee (cm): 0 cm  L 40 cm Above Knee (cm): 0 cm  L 50 cm Above Knee (cm): 0 cm  Left Lower extremity total: 474.5  LE Skin Appearance/Condition and Girth:     LPain Assessment:  Pain Assessment: 0-10  Pain Score: 2  Pain Type: Chronic pain  Pain Location: Back  Pain Orientation: Lower  Pain Descriptors: Discomfort, Cramping  Pain Frequency: Intermittent  Therapy Precautions:  Precautions Comment: SC for mobility as needed    OP EDUCATION:       Goals:  Active       OT/lymphedema goals        Pt will be I with stating and demonstrating home exercise program in order to improve patients ability to perform self-care, household, work and/or leisure tasks.  (Progressing)       Start:  10/10/23    Expected End:  02/02/24            Pt will be able to perform self care, household, work and or leisure tasks with   0-2/10 pain rating, 100 % of the time  (Progressing)       Start:  10/10/23    Expected End:  02/02/24            Pt will demo 5% cm  of volume reduction with BLE in order for proper fitting of medical compression garments and increase ease in transfers and IADLs  (Progressing)       Start:  10/10/23    Expected End:  02/02/24            Pt will I perform skin care for infection prevention and integrity of skin  (Progressing)       Start:  10/10/23    Expected End:  02/02/24            I with donning/doffing medical compression garments with AE as needed  (Progressing)       Start:  10/10/23    Expected End:  02/02/24

## 2023-12-21 ENCOUNTER — APPOINTMENT (OUTPATIENT)
Dept: PHYSICAL MEDICINE AND REHAB | Facility: CLINIC | Age: 68
End: 2023-12-21
Payer: MEDICARE

## 2023-12-21 ENCOUNTER — TREATMENT (OUTPATIENT)
Dept: PHYSICAL THERAPY | Facility: HOSPITAL | Age: 68
End: 2023-12-21
Payer: MEDICARE

## 2023-12-21 DIAGNOSIS — M47.816 LUMBAR SPONDYLOSIS: ICD-10-CM

## 2023-12-21 DIAGNOSIS — M62.81 WEAKNESS OF TRUNK MUSCULATURE: Primary | ICD-10-CM

## 2023-12-21 DIAGNOSIS — M62.81 WEAKNESS OF TRUNK MUSCULATURE: ICD-10-CM

## 2023-12-21 PROCEDURE — 97113 AQUATIC THERAPY/EXERCISES: CPT | Mod: GP,CQ

## 2023-12-21 PROCEDURE — 97110 THERAPEUTIC EXERCISES: CPT | Mod: GP,CQ | Performed by: PHYSICAL THERAPY ASSISTANT

## 2023-12-21 ASSESSMENT — PAIN DESCRIPTION - DESCRIPTORS
DESCRIPTORS: DULL;SORE
DESCRIPTORS: DULL;SORE
DESCRIPTORS: DULL;ACHING
DESCRIPTORS: DULL;ACHING

## 2023-12-21 ASSESSMENT — PAIN SCALES - GENERAL
PAINLEVEL_OUTOF10: 2
PAINLEVEL_OUTOF10: 3
PAINLEVEL_OUTOF10: 2
PAINLEVEL_OUTOF10: 2

## 2023-12-21 ASSESSMENT — PAIN - FUNCTIONAL ASSESSMENT
PAIN_FUNCTIONAL_ASSESSMENT: 0-10
PAIN_FUNCTIONAL_ASSESSMENT: 0-10

## 2023-12-21 NOTE — PROGRESS NOTES
"Physical Therapy    Physical Therapy Treatment    Patient Name: Ariana Wills  MRN: [id number]  : 1955   Today's Date: 2023  Time Calculation  Start Time: 1445  Stop Time: 1525  Time Calculation (min): 40 min  Visit #14      Current Problem  1. Weakness of trunk musculature        2. Lumbar spondylosis                Subjective   Pt states she came from aqua therapy and L knee has increased in pain.   Pain clinic appointment on 23 for possible injection.       Precautions  Precautions  Precautions Comment: no change    Precautions fall precautions and Precautions  STEADI Fall Risk Score (The score of 4 or more indicates an increased risk of falling): 10.  1 fall in last 1 year  Medical Precautions:  (hx of basal cell cancer, neuropathy in feet and hands, lypodema tx with OT in [facility].)  Post-Surgical Precautions:  (no prior ortho surgery to spine or hips)     Pain  Pain Assessment: 0-10  Pain Score: 2  Pain Type: Chronic pain  Pain Location: Back  Pain Orientation: Lower  Pain Descriptors: Dull, Aching  Pain Frequency: Intermittent  3/10 Left>R knee      Objective    See flow sheet     Treatments:  EXERCISES 23    Date       VISIT# #12 #13 #14 #    REPS REPS REPS REPS   Nu-step L1 10' L1 x 10 L1 x 10'    RB stretch  3 x 30 sec 3 x 30\" 3 x 30\"            Sitting  Seated Seated     T-band ext Green 2x10 Green 2 x 10 Green 2 x 10    chops Green 2x10 Green 2 x 10 Green 2 x 10    rows Green 2x10 Green 2 x 10 Green 2 x 10           Standing hip abd 2x5 each  2 x 5 each    Standing hip flexion  X5 each X 5 each X 5 each           Scapular retract  3 sec x 10      Sit to Stand 2X5 no UE support              DKTC  Green SB 2x10 Green 2 x 10 Green 2 x 10    Bridges 3 sec x 6 3\" x 5 3\" x 5    Clamshells  X10 each  X 10 each X 10 each                                       AMB 2 laps at 1:50 minutes, (each lap is 170 ft)  2 laps 2:01 2 laps 2:43                                          "                   HS stretch               HEP          Assessment:  Pt ambulation time improved, able to complete full session although fatigued at end.        Plan:   Cont with POC as tolerated to decrease pain and increase strength.   Recheck next week.     OP EDUCATION:       Goals:  Active       PT Problem       PT Goal        Start:  11/01/23    Expected End:  11/15/23       Short term goals ( 2 week)   Patient to be independent with home exercises within pain free range to stretch Lower extremities and to strengthen core abdominals and core stabilization to increase mobility  Patient to demonstrate understanding of what it means to have neutral posture alignment, the use of positioning strategies and body mechanics principles to reduce pain with everyday activities.            PT Goal        Start:  11/01/23    Expected End:  11/29/23       Long term goal 4 weeks:  Patient to gain the functional range of motion necessary in the  lumbar spine to perform activities of daily living with increased ease.    Long term goals 4 weeks : Patient to have reduced pain by 50% or more for increased function and mobility           PT Goal        Start:  11/01/23    Expected End:  11/29/23       Long term goals ( 4 week)   Patient to demonstrate 1/3 MMT strength gain or more in targeted muscle groups and core strength for increased overall mobility   Patient to demonstrate a 4 point or more change in Oswestry to indicate reduced impairment with every day living             PT Goal        Start:  11/01/23    Expected End:  11/29/23       Patient to return to community mobility and household chores and job related activity with pain levels managed using increased awareness of engaging core stabilization and strategies utilized to manage pain.              .

## 2023-12-21 NOTE — PROGRESS NOTES
Physical Therapy    Physical Therapy Treatment    Patient Name: Ariana Wills  MRN: [id number]  Today's Date: 12/21/2023     VISIT 14      Assessment:  PT Assessment  Assessment Comment: good adriel of all treatment improved pace with amb and exs, pain 1/10 after treatment in LB and Knees    Plan:       Current Problem  1. Lumbar spondylosis  Follow Up In Physical Therapy      2. Weakness of trunk musculature  Follow Up In Physical Therapy          Subjective  pt reports feeling pretty good overall only about 2/10 today       Precautions  Precautions  Precautions Comment: no change     Pain  Pain Assessment: 0-10  Pain Score: 2  Pain Type: Chronic pain  Pain Location: Back  Pain Orientation: Lower  Pain Descriptors: Dull, Aching  Pain Frequency: Intermittent    Objective         Treatments:     Aquatic Exercise  Aquatic Exercise Performed: Yes  Aquatic Exercise Activity 1: Amb Forw/Retro/Lat X 2 EA (with BDB)  Aquatic Exercise Activity 2: Marching X 2  Aquatic Exercise Activity 3: Squats X 25  Aquatic Exercise Activity 4: TR/HR X 25 EA  Aquatic Exercise Activity 5: SLR X 3 - Lat/Flex/Ext x 20 EA  Aquatic Exercise Activity 6: gastroc and HS [id number] sec  Aquatic Exercise Activity 7: Deep water distraction X 5' Flex  Aquatic Exercise Activity 8: step ups 20 EA 5' sect  Aquatic Exercise Activity 9: tray exercises push/pull and up/down x10 with T2  Aquatic Exercise Activity 10: wand exs - Shoulder Flex/Ext and ABD/ADD X 15 EA with BDBs      Goals:  Active       PT Problem       PT Goal        Start:  11/01/23    Expected End:  11/15/23       Short term goals ( 2 week)   Patient to be independent with home exercises within pain free range to stretch Lower extremities and to strengthen core abdominals and core stabilization to increase mobility  Patient to demonstrate understanding of what it means to have neutral posture alignment, the use of positioning strategies and body mechanics principles to reduce pain with  everyday activities.            PT Goal        Start:  11/01/23    Expected End:  11/29/23       Long term goal 4 weeks:  Patient to gain the functional range of motion necessary in the  lumbar spine to perform activities of daily living with increased ease.    Long term goals 4 weeks : Patient to have reduced pain by 50% or more for increased function and mobility           PT Goal        Start:  11/01/23    Expected End:  11/29/23       Long term goals ( 4 week)   Patient to demonstrate 1/3 MMT strength gain or more in targeted muscle groups and core strength for increased overall mobility   Patient to demonstrate a 4 point or more change in Oswestry to indicate reduced impairment with every day living             PT Goal        Start:  11/01/23    Expected End:  11/29/23       Patient to return to community mobility and household chores and job related activity with pain levels managed using increased awareness of engaging core stabilization and strategies utilized to manage pain.

## 2023-12-27 ENCOUNTER — OFFICE VISIT (OUTPATIENT)
Dept: PAIN MEDICINE | Facility: HOSPITAL | Age: 68
End: 2023-12-27
Payer: MEDICARE

## 2023-12-27 ENCOUNTER — TREATMENT (OUTPATIENT)
Dept: OCCUPATIONAL THERAPY | Facility: CLINIC | Age: 68
End: 2023-12-27
Payer: MEDICARE

## 2023-12-27 ENCOUNTER — APPOINTMENT (OUTPATIENT)
Dept: PAIN MEDICINE | Facility: HOSPITAL | Age: 68
End: 2023-12-27
Payer: MEDICARE

## 2023-12-27 VITALS
BODY MASS INDEX: 47.09 KG/M2 | RESPIRATION RATE: 16 BRPM | SYSTOLIC BLOOD PRESSURE: 176 MMHG | DIASTOLIC BLOOD PRESSURE: 62 MMHG | WEIGHT: 293 LBS | HEIGHT: 66 IN | HEART RATE: 56 BPM

## 2023-12-27 DIAGNOSIS — I89.0 LYMPHEDEMA OF BOTH LOWER EXTREMITIES: Primary | ICD-10-CM

## 2023-12-27 DIAGNOSIS — M17.0 PRIMARY OSTEOARTHRITIS OF BOTH KNEES: Primary | ICD-10-CM

## 2023-12-27 DIAGNOSIS — M79.604 PAIN IN BOTH LOWER EXTREMITIES: ICD-10-CM

## 2023-12-27 DIAGNOSIS — R60.9 LIPEDEMA: ICD-10-CM

## 2023-12-27 DIAGNOSIS — R29.898 WEAKNESS OF BOTH LOWER EXTREMITIES: ICD-10-CM

## 2023-12-27 DIAGNOSIS — M79.605 PAIN IN BOTH LOWER EXTREMITIES: ICD-10-CM

## 2023-12-27 DIAGNOSIS — E66.01 MORBID OBESITY (MULTI): ICD-10-CM

## 2023-12-27 PROCEDURE — 97140 MANUAL THERAPY 1/> REGIONS: CPT | Mod: GO

## 2023-12-27 PROCEDURE — 1159F MED LIST DOCD IN RCRD: CPT | Performed by: PHYSICAL MEDICINE & REHABILITATION

## 2023-12-27 PROCEDURE — 1036F TOBACCO NON-USER: CPT | Performed by: PHYSICAL MEDICINE & REHABILITATION

## 2023-12-27 PROCEDURE — 3078F DIAST BP <80 MM HG: CPT | Performed by: PHYSICAL MEDICINE & REHABILITATION

## 2023-12-27 PROCEDURE — 3077F SYST BP >= 140 MM HG: CPT | Performed by: PHYSICAL MEDICINE & REHABILITATION

## 2023-12-27 PROCEDURE — 1160F RVW MEDS BY RX/DR IN RCRD: CPT | Performed by: PHYSICAL MEDICINE & REHABILITATION

## 2023-12-27 PROCEDURE — 99214 OFFICE O/P EST MOD 30 MIN: CPT | Performed by: PHYSICAL MEDICINE & REHABILITATION

## 2023-12-27 PROCEDURE — 97110 THERAPEUTIC EXERCISES: CPT | Mod: GO

## 2023-12-27 PROCEDURE — 99204 OFFICE O/P NEW MOD 45 MIN: CPT | Performed by: PHYSICAL MEDICINE & REHABILITATION

## 2023-12-27 PROCEDURE — 1125F AMNT PAIN NOTED PAIN PRSNT: CPT | Performed by: PHYSICAL MEDICINE & REHABILITATION

## 2023-12-27 ASSESSMENT — COLUMBIA-SUICIDE SEVERITY RATING SCALE - C-SSRS
6. HAVE YOU EVER DONE ANYTHING, STARTED TO DO ANYTHING, OR PREPARED TO DO ANYTHING TO END YOUR LIFE?: NO
2. HAVE YOU ACTUALLY HAD ANY THOUGHTS OF KILLING YOURSELF?: NO
1. IN THE PAST MONTH, HAVE YOU WISHED YOU WERE DEAD OR WISHED YOU COULD GO TO SLEEP AND NOT WAKE UP?: NO

## 2023-12-27 ASSESSMENT — PATIENT HEALTH QUESTIONNAIRE - PHQ9
10. IF YOU CHECKED OFF ANY PROBLEMS, HOW DIFFICULT HAVE THESE PROBLEMS MADE IT FOR YOU TO DO YOUR WORK, TAKE CARE OF THINGS AT HOME, OR GET ALONG WITH OTHER PEOPLE: NOT DIFFICULT AT ALL
1. LITTLE INTEREST OR PLEASURE IN DOING THINGS: NOT AT ALL
2. FEELING DOWN, DEPRESSED OR HOPELESS: SEVERAL DAYS
SUM OF ALL RESPONSES TO PHQ9 QUESTIONS 1 AND 2: 1

## 2023-12-27 ASSESSMENT — PAIN - FUNCTIONAL ASSESSMENT: PAIN_FUNCTIONAL_ASSESSMENT: 0-10

## 2023-12-27 ASSESSMENT — ENCOUNTER SYMPTOMS
LOSS OF SENSATION IN FEET: 0
DEPRESSION: 0
OCCASIONAL FEELINGS OF UNSTEADINESS: 1

## 2023-12-27 ASSESSMENT — PAIN DESCRIPTION - DESCRIPTORS: DESCRIPTORS: ACHING

## 2023-12-27 ASSESSMENT — PAIN SCALES - GENERAL: PAINLEVEL_OUTOF10: 4

## 2023-12-27 NOTE — PROGRESS NOTES
"Occupational Therapy    Occupational Therapy Treatment    Name: Ariana Wills  MRN: [id number]  : 1955  Date: 23  Time Calculation  Start Time: 1011  Stop Time: 1110  Time Calculation (min): 59 min    Assessment: increased tissue pliability      Plan: Continue with POC as tolerated, monitor home program, assess new garments,           Subjective   \" I know I am larger today. I didn't do much of my therapy at home over the holidays\"   General:  OT Last Visit  OT Received On: 23     Precautions:  Precautions Comment: NA  Pain Assessment:  Pain Assessment  Pain Assessment: 0-10  Pain Score: 4  Pain Type: Chronic pain  Pain Location: Leg  Pain Orientation: Right, Left  Pain Descriptors: Aching  Pain Frequency: Intermittent    Objective       Therapy/Activity: Therapeutic Exercise  Therapeutic Exercise Performed: Yes  Therapeutic Exercise Activity 1: hip fall outs with bracing for LB support 10 reps 5 sec hold  Therapeutic Exercise Activity 2: bridges with 3 sec hold 10 reps    Therapeutic Activity  Therapeutic Activity Performed: Yes  Therapeutic Activity 1: hivamat on BLE in order to promote lymph flow and reduce edema       Lymphedema Assessment    Lymphedema Assessments:  Lymphedema Assessments: Lower extremities  Right Lower Extremity:  R Metatarsal (cm): 22.3 cm  R Heel Y Angle: 35.5 cm  R Ankle (cm): 30 cm  R 10 cm Above Ankle (cm): 43.5 cm  R 20 cm Above Ankle (cm): 57.5 cm  R 30 cm Above Ankle (cm): 61 cm  R 40 cm Above Ankle (cm): 0 cm  R Knee (cm): 62 cm  R 10 cm Above Knee (cm): 79.5 cm  R 20 cm Above Knee (cm): 82.5 cm  R 30 cm Above Knee (cm): 0 cm  R 40 cm Above Knee (cm): 0 cm  R 50 cm Above Knee (cm): 0 cm  Right lower extremity total: 473.8  Left Lower Extremity:  L Metatarsal (cm): 22.3 cm  L Heel Y Angle: 36 cm  L Ankle (cm): 30.5 cm  L 10 cm Above Ankle (cm): 43.5 cm  L 20 cm Above Ankle (cm): 58.5 cm  L 30 cm Above Ankle (cm): 60 cm  L 40 cm Above Ankle (cm): 0 cm  L Knee (cm): " 61 cm  L 10 cm Above Knee (cm): 81.5 cm  L 20 cm Above Knee (cm): 83 cm  L 30 cm Above Knee (cm): 0 cm  L 40 cm Above Knee (cm): 0 cm  L 50 cm Above Knee (cm): 0 cm  Left Lower extremity total: 476.3     Pain Assessment:  Pain Assessment: 0-10  Pain Score: 4  Pain Type: Chronic pain  Pain Location: Leg  Pain Orientation: Right, Left  Pain Descriptors: Aching  Pain Frequency: Intermittent  Therapy Precautions:  Precautions Comment: NA    OP EDUCATION:       Goals:  Active       OT/lymphedema goals        Pt will be I with stating and demonstrating home exercise program in order to improve patients ability to perform self-care, household, work and/or leisure tasks.  (Progressing)       Start:  10/10/23    Expected End:  02/02/24            Pt will be able to perform self care, household, work and or leisure tasks with   0-2/10 pain rating, 100 % of the time  (Progressing)       Start:  10/10/23    Expected End:  02/02/24            Pt will demo 5% cm  of volume reduction with BLE in order for proper fitting of medical compression garments and increase ease in transfers and IADLs  (Progressing)       Start:  10/10/23    Expected End:  02/02/24            Pt will I perform skin care for infection prevention and integrity of skin  (Progressing)       Start:  10/10/23    Expected End:  02/02/24            I with donning/doffing medical compression garments with AE as needed  (Progressing)       Start:  10/10/23    Expected End:  02/02/24

## 2023-12-27 NOTE — ASSESSMENT & PLAN NOTE
68-year-old female with history of morbid obesity with bilateral knee pain secondary to knee osteoarthritis.  I did personally review patient's x-ray of her bilateral knee showing fairly significant degenerative changes especially in her medial compartment.  Given patient's body habitus she was sent to me for consideration of doing an image guided knee injection.  I do think that given her habitus it would be medically necessary to do this under ultrasound guidance.  She has not been able to tolerate physical therapy due to the knee pain.  I will get her scheduled for bilateral ultrasound-guided knee injection so that she is able to tolerate physical therapy.    I will defer medication management to Dr. Alba, she should continue with current regimen, would consider addition of duloxetine in the future as well if she has not been on that.    We also discussed potential for diagnostic genicular nerve blocks followed by radiofrequency ablation should the intra-articular steroid injections not provide her with long-term relief.

## 2023-12-27 NOTE — PROGRESS NOTES
"Subjective   Patient ID: Ariana Wills is a 68 y.o. female who presents for Knee Pain.  HPI    Patient presents to office for initial eval of bilateral knee pain, left worse than right. Described as grinding and stabbing pains, does not normally radiate without extensive activity. Patient states she is unable to fully estend legs due to pain.     Pain Score: 6/10    Injections/Procedures: had a right knee injection in the past without relief-- unsure of what type of shot, per patient \"It was a fancy pain shot\". Currently in PT.    Neuromodulators/Other Medications: gabapentin 300 BID    Other:    OSWESTRY SCORE: 52%    68-year-old female with history of morbid obesity and lymphedema referred by my colleague Dr. Alba for consideration of ultrasound-guided versus fluoroscopy guided bilateral knee injection given patient's body habitus.  Patient has had knee pain for some time worse with weightbearing and activity to the point where she has been unable to tolerate physical therapy.  She is currently undergoing lymphedema treatment as well.  Is currently managed with gabapentin and Lidoderm patches which helps some.                    Monitoring and compliance:    ORT:    PDUQ:    Office Agreement: 12/27/23      Review of Systems   All other systems reviewed and are negative.       Current Outpatient Medications   Medication Instructions    acetaminophen (Tylenol) 500 mg tablet oral, Every 6 hours PRN    albuterol 90 mcg/actuation inhaler inhalation    atenolol (Tenormin) 100 mg tablet TAKE 1 TABLET DAILY    azelastine (Astelin) 137 mcg (0.1 %) nasal spray 2 sprays, Each Nostril, 2 times daily    cholecalciferol (Vitamin D-3) 5,000 Units tablet 1 tablet, oral, Daily    famotidine (PEPCID) 20 mg, oral, Nightly    flunisolide (Nasalide) 25 mcg (0.025 %) spray,non-aerosol nasal    gabapentin (NEURONTIN) 300 mg, oral, 2 times daily    glucosamine HCl-msm-chondroitn 750-375-400 mg tablet oral    hydroCHLOROthiazide " (HYDRODiuril) 25 mg tablet TAKE 1 TABLET DAILY    levothyroxine (Synthroid, Levoxyl) 50 mcg tablet TAKE 1 TABLET DAILY    lidocaine (Lidoderm) 5 % patch 1 patch, transdermal, Daily, Remove & discard patch within 12 hours or as directed by MD.    lidocaine (Lidoderm) 5 % patch 1 patch, transdermal, Daily, Remove & discard patch within 12 hours or as directed by MD.    lisinopril 40 mg tablet TAKE 1 TABLET DAILY    multivitamin capsule oral    rosuvastatin (CRESTOR) 10 mg, oral, Nightly        Past Medical History:   Diagnosis Date    Abnormal weight gain 05/04/2016    Weight gain    Candidiasis, unspecified 05/22/2015    Yeast infection    Cellulitis, unspecified 09/07/2014    Cellulitis    Encounter for screening for malignant neoplasm of cervix 05/22/2015    Pap smear for cervical cancer screening    Encounter for screening mammogram for malignant neoplasm of breast 05/22/2015    Visit for screening mammogram    Encounter for screening mammogram for malignant neoplasm of breast 05/22/2015    Visit for screening mammogram    Furuncle of buttock 09/18/2019    Boil, buttock    Hereditary and idiopathic neuropathy, unspecified 09/29/2013    Idiopathic peripheral neuropathy    Lymphedema, not elsewhere classified 05/22/2015    Lymphedema    Other symptoms and signs involving the musculoskeletal system 05/03/2016    Complaints of leg weakness    Pain in left hip 05/03/2016    Hip pain, acute, left    Personal history of other diseases of the nervous system and sense organs     History of peripheral neuropathy    Personal history of other diseases of the respiratory system 04/10/2015    History of acute bronchitis    Personal history of other diseases of the respiratory system 06/22/2015    History of sinusitis    Personal history of other specified conditions 06/28/2016    History of dizziness    Personal history of other specified conditions 05/22/2015    History of chest pain    Unspecified asthma with (acute)  exacerbation 05/03/2016    Asthma exacerbation    Weakness of trunk musculature 11/01/2023        Past Surgical History:   Procedure Laterality Date    CARDIAC CATHETERIZATION  04/03/2023    CARPAL TUNNEL RELEASE  09/27/2013    Neuroplasty Decompression Median Nerve At Carpal Tunnel    CHOLECYSTECTOMY  09/27/2013    Cholecystectomy    COLONOSCOPY      DILATION AND CURETTAGE OF UTERUS          Family History   Problem Relation Name Age of Onset    Colon cancer Mother      Other (cardiac disorder) Mother      Heart failure Father      Other (INTERNAL BLEEDING) Brother      Asthma Other      Heart disease Other      Hypertension Other      Sleep disorder Other      Arthritis Other      Other (hay fever) Other          No Known Allergies     Objective     Vitals:    12/27/23 1544   BP: 176/62   Pulse: 56   Resp: 16        Physical Exam    GENERAL EXAM  Vital Signs: Vital signs to include heart rate, respiration rate, blood pressure, and temperature were reviewed.  General Appearance:  Awake, alert, healthy appearing, well developed, No acute distress.  Head: Normocephalic without evidence of head injury.  Neck: The appearance of the neck was normal without swelling with a midline trachea.  Eyes: The eyelids and eyebrows exhibited no abnormalities.  Pupils were not pin-point.  Sclera was without icterus.  Lungs: Respiration rhythm and depth was normal.  Respiratory movements were normal without labored breathing.  Cardiovascular: No peripheral edema was present.    Neurological: Patient was oriented to time, place, and person.  Speech was normal.  Balance, gait, and stance were unremarkable.    Psychiatric: Appearance was normal with appropriate dress.  Mood was euthymic and affect was normal.  Skin: Affected regions were without ecchymosis or skin lesions.        Physical exam as above except:  Morbidly obese female with significantly edematous bilateral lower extremities.  Tenderness to palpation over left greater than  right medial joint line of bilateral knees.      Assessment/Plan   Problem List Items Addressed This Visit             ICD-10-CM    Morbid obesity (CMS/[facility]) E66.01    Primary osteoarthritis of both knees - Primary M17.0     68-year-old female with history of morbid obesity with bilateral knee pain secondary to knee osteoarthritis.  I did personally review patient's x-ray of her bilateral knee showing fairly significant degenerative changes especially in her medial compartment.  Given patient's body habitus she was sent to me for consideration of doing an image guided knee injection.  I do think that given her habitus it would be medically necessary to do this under ultrasound guidance.  She has not been able to tolerate physical therapy due to the knee pain.  I will get her scheduled for bilateral ultrasound-guided knee injection so that she is able to tolerate physical therapy.    I will defer medication management to Dr. Alba, she should continue with current regimen, would consider addition of duloxetine in the future as well if she has not been on that.    We also discussed potential for diagnostic genicular nerve blocks followed by radiofrequency ablation should the intra-articular steroid injections not provide her with long-term relief.         Relevant Orders    Joint Injection/Aspiration            This note was generated with the aid of dictation software, there may be typos despite my attempts at proofreading.

## 2023-12-28 ENCOUNTER — TREATMENT (OUTPATIENT)
Dept: PHYSICAL THERAPY | Facility: HOSPITAL | Age: 68
End: 2023-12-28
Payer: MEDICARE

## 2023-12-28 DIAGNOSIS — M62.81 WEAKNESS OF TRUNK MUSCULATURE: Primary | ICD-10-CM

## 2023-12-28 DIAGNOSIS — M47.816 LUMBAR SPONDYLOSIS: ICD-10-CM

## 2023-12-28 PROCEDURE — 97113 AQUATIC THERAPY/EXERCISES: CPT | Mod: GP,CQ | Performed by: PHYSICAL THERAPY ASSISTANT

## 2023-12-28 PROCEDURE — 97110 THERAPEUTIC EXERCISES: CPT | Mod: GP | Performed by: PHYSICAL THERAPIST

## 2023-12-28 ASSESSMENT — PAIN - FUNCTIONAL ASSESSMENT
PAIN_FUNCTIONAL_ASSESSMENT: 0-10
PAIN_FUNCTIONAL_ASSESSMENT: 0-10

## 2023-12-28 ASSESSMENT — PAIN SCALES - GENERAL
PAINLEVEL_OUTOF10: 3
PAINLEVEL_OUTOF10: 4
PAINLEVEL_OUTOF10: 5 - MODERATE PAIN

## 2023-12-28 ASSESSMENT — PAIN DESCRIPTION - DESCRIPTORS
DESCRIPTORS: ACHING
DESCRIPTORS: ACHING
DESCRIPTORS: DULL;SORE

## 2023-12-28 NOTE — PROGRESS NOTES
Physical Therapy        Physical Therapy Treatment    Patient Name: Ariana Wills  MRN: [id number]  Today's Date: 12/28/2023  Time Calculation  Start Time: 1300  Stop Time: 1345  Time Calculation (min): 45 min  Visit #15    Assessment:  PT Assessment  Evaluation/Treatment Tolerance: Patient tolerated treatment well  Assessment Comment: pt is making good progress toward all goals set by PT. She is demonstrating decreased pain at times and has increased her strength.  Plan:  OP PT Plan  PT Plan:  (pt to have recheck with PT this date)    Current Problem  1. Lumbar spondylosis  Follow Up In Physical Therapy       2. Weakness of trunk musculature  Follow Up In Physical Therapy                     Subjective  pt reported her Lymphedema therapist added new exercises yesterday and she was sore.   Precautions  Precautions  Precautions Comment: N/A    Pain  Pain Assessment  Pain Assessment: 0-10  Pain Score: 4  Pain Type: Chronic pain  Pain Location: Leg  Pain Orientation: Right, Left  Pain Descriptors: Aching  Pain Frequency: Intermittent  Pain 2  Pain Score 2: 3  Pain Type 2: Chronic pain  Pain Location 2: Knee  Pain Orientation 2: Left  Pain Descriptors 2: Dull, Sore    Objective  no changes to program secondary to having recheck with PT after aquatic program.          Treatments:  Aquatic Exercise  Aquatic Exercise Performed: Yes  Aquatic Exercise Activity 1: Amb Forw/Retro/Lat X 2 EA  Aquatic Exercise Activity 2: Marching X 2  Aquatic Exercise Activity 3: Squats X 25  Aquatic Exercise Activity 4: TR/HR X 25 EA  Aquatic Exercise Activity 5: SLR X 3 - Lat/Flex/Ext x 25 EA  Aquatic Exercise Activity 6: gastroc and HS stretches  4x20 sec  Aquatic Exercise Activity 7: Deep water distraction X 5' Flex  Aquatic Exercise Activity 8: step ups 20 EA 5' sect  Aquatic Exercise Activity 9: tray exercises push/pull and up/down x20 with T2  Aquatic Exercise Activity 10: wand exs - Shoulder Flex/Ext and ABD/ADD, horizontal ABD/ADD BDB  x20       Goals:  Active       PT Problem       PT Goal        Start:  11/01/23    Expected End:  11/15/23       Short term goals ( 2 week)   Patient to be independent with home exercises within pain free range to stretch Lower extremities and to strengthen core abdominals and core stabilization to increase mobility  Patient to demonstrate understanding of what it means to have neutral posture alignment, the use of positioning strategies and body mechanics principles to reduce pain with everyday activities.            PT Goal        Start:  11/01/23    Expected End:  11/29/23       Long term goal 4 weeks:  Patient to gain the functional range of motion necessary in the  lumbar spine to perform activities of daily living with increased ease.    Long term goals 4 weeks : Patient to have reduced pain by 50% or more for increased function and mobility           PT Goal        Start:  11/01/23    Expected End:  11/29/23       Long term goals ( 4 week)   Patient to demonstrate 1/3 MMT strength gain or more in targeted muscle groups and core strength for increased overall mobility   Patient to demonstrate a 4 point or more change in Oswestry to indicate reduced impairment with every day living             PT Goal        Start:  11/01/23    Expected End:  11/29/23       Patient to return to community mobility and household chores and job related activity with pain levels managed using increased awareness of engaging core stabilization and strategies utilized to manage pain.

## 2023-12-28 NOTE — PROGRESS NOTES
Physical Therapy    Physical Therapy    Physical Therapy Treatment and Discharge      Patient Name: Ariana Wills  MRN: [id number]  Today's Date: 12/28/2023  Visit # 15  Discharge visit 12-28-23  Time Calculation  Start Time: 0200  Stop Time: 0230  Time Calculation (min): 30 min      Subjective   5/10 LBP,  knees kai 4-6/10   Pt states she feels ready to self manage with her home exercises. She also knows her aquatics ex and may consider community pool  Pt will be getting knee injections in Feb.         Precautions  Precautions  STEADI Fall Risk Score (The score of 4 or more indicates an increased risk of falling): 10 ( 1 fall)   1 fall in last 1 year  Medical Precautions:  (hx of basal cell cancer, neuropathy in feet and hands, lypodema tx with OT in [facility].)  Post-Surgical Precautions:  (no prior ortho surgery to spine or hips)      Current Problem:   1. Weakness of trunk musculature        2. Lumbar spondylosis            Pain:  Pain Assessment  Pain Assessment: 0-10  Pain Score: 5 - Moderate pain  Pain Type: Chronic pain (5-6/10 after water ex.  increases from 4/10)  Pain Location: Back (back  5/10, knees 4-6/10. refer for LBP)  Pain Orientation: Right, Left  Pain Descriptors: Aching  Pain Frequency: Intermittent      Objective         Endurance walking is limited 2 min 45 with no cane, able to tolerate longer with cane. Advise to build timed walking in her home 2 x a day working towards 3 min each walk, may use cane to reduce lateral lean      PT for core strengthening for lumbar spondylosis. Pt is sedentary, has obesity and swelling in legs, imited LE ROM and impaired mobility related to degenerative changes in lumbar sacral area. Therapy included aquatics and instruction in home exercises.    spinal stenosis and facet arthropathy  xr L spine shows L5/s1 degenerative disc disease,  xr hips w mild oa. mild hip oa and extensive spondylosis    Past Medical History Relevant to Rehab: sees OT for lymphedemaMs.  "Elma is a 67 yo F w pmh of obesity, lymphedema, lipedema, hypothyroidism, gerd presenting with back and hip pain.        Trunk AROM Full no pain  Knee AROM  Rt  [place]   Hip flex WNL,   Obese and swelling in legs roscoe - Has OT   Trunk AROM Full no pain     MMT   Core abdominal 4-/5  Hip flex 4-/5 [place]  Knee ext 4-/5   Knee flex 4-/5 or more [place]     Amb with cane when out of home ( 1 year)  No device in home or in clinic but uses to park lot.  Has 1 step to enter , holds door jam         Outcome Oswestry 34% improved from 46%         Treatments:  Treatments:  EXERCISES 12-14-23 12/19/23 12/21/23 12-28-23   Date           VISIT# #12 #13 #14 #15     REPS REPS REPS REPS   Nu-step L1 10' L1 x 10 L1 x 10'     RB stretch  3 x 30 sec 3 x 30\" 3 x 30\"                   Sitting  Seated Seated      T-band ext Green 2x10 Green 2 x 10 Green 2 x 10  home   chops Green 2x10 Green 2 x 10 Green 2 x 10  home   rows Green 2x10 Green 2 x 10 Green 2 x 10  home               Standing hip abd 2x5 each   2 x 5 each  8 x 1   Standing hip flexion  X5 each X 5 each X 5 each  5 xea               Scapular retract  3 sec x 10      10 x   Sit to Stand 2X5 no UE support                      DKTC  Green SB 2x10 Green 2 x 10 Green 2 x 10  Green 2 x 10   Bridges 3 sec x 6 3\" x 5 3\" x 5  5 x  3\"   Clamshells  X10 each  X 10 each X 10 each  home                                                               AMB 2 laps at 1:50 minutes, (each lap is 170 ft)  2 laps 2:01 2 laps 2:43  2 : 45   No cane  +SOB                                                                                                   HS stretch                        HEP              Outpatient Education  Patient Response to Education: Patient/Caregiver Verbalized Understanding of Information  Education Comment: exercise , avoid pushing through pain  Assessment: SOB and increased lateral sway if amb more than 2 min.  Max walk is 2min 45 sec.  Pt is walking 2 min walk at home 1 x " daily but advised to build on time and  2 x a day. Outcomes goals met.       Plan discharge PT and pt will do home exercises. Pt has limits in her activity and rehab for her LBP due to knee pain. she is scheduled for injections in Feb.     Goals:  Resolved       PT Problem       PT Goal  (Met)       Start:  11/01/23    Expected End:  12/28/23    Resolved:  12/28/23    Short term goals ( 2 week)   Patient to be independent with home exercises within pain free range to stretch Lower extremities and to strengthen core abdominals and core stabilization to increase mobility  Patient to demonstrate understanding of what it means to have neutral posture alignment, the use of positioning strategies and body mechanics principles to reduce pain with everyday activities.            PT Goal  (Met)       Start:  11/01/23    Expected End:  12/28/23    Resolved:  12/28/23    Long term goal 4 weeks:  Patient to gain the functional range of motion necessary in the  lumbar spine to perform activities of daily living with increased ease.    Long term goals 4 weeks : Patient to have reduced pain by 50% or more for increased function and mobility           PT Goal  (Met)       Start:  11/01/23    Expected End:  12/28/23    Resolved:  12/28/23    Long term goals ( 4 week)   Patient to demonstrate 1/3 MMT strength gain or more in targeted muscle groups and core strength for increased overall mobility   Patient to demonstrate a 4 point or more change in Oswestry to indicate reduced impairment with every day living             PT Goal  (Adequate for Discharge)       Start:  11/01/23    Expected End:  12/28/23       Patient to return to community mobility and household chores and job related activity with pain levels managed using increased awareness of engaging core stabilization and strategies utilized to manage pain.

## 2024-01-03 DIAGNOSIS — K21.9 GASTRO-ESOPHAGEAL REFLUX DISEASE WITHOUT ESOPHAGITIS: ICD-10-CM

## 2024-01-03 RX ORDER — FAMOTIDINE 20 MG/1
20 TABLET, FILM COATED ORAL NIGHTLY
Qty: 30 TABLET | Refills: 2 | Status: SHIPPED | OUTPATIENT
Start: 2024-01-03 | End: 2024-03-07

## 2024-02-01 ENCOUNTER — APPOINTMENT (OUTPATIENT)
Dept: OCCUPATIONAL THERAPY | Facility: CLINIC | Age: 69
End: 2024-02-01
Payer: MEDICARE

## 2024-02-08 ENCOUNTER — TREATMENT (OUTPATIENT)
Dept: OCCUPATIONAL THERAPY | Facility: CLINIC | Age: 69
End: 2024-02-08
Payer: MEDICARE

## 2024-02-08 DIAGNOSIS — M79.604 PAIN IN BOTH LOWER EXTREMITIES: ICD-10-CM

## 2024-02-08 DIAGNOSIS — R29.898 WEAKNESS OF BOTH LOWER EXTREMITIES: ICD-10-CM

## 2024-02-08 DIAGNOSIS — M79.605 PAIN IN BOTH LOWER EXTREMITIES: ICD-10-CM

## 2024-02-08 DIAGNOSIS — R60.9 LIPEDEMA: ICD-10-CM

## 2024-02-08 PROCEDURE — 97140 MANUAL THERAPY 1/> REGIONS: CPT | Mod: GO

## 2024-02-08 PROCEDURE — 97530 THERAPEUTIC ACTIVITIES: CPT | Mod: GO,59

## 2024-02-08 ASSESSMENT — PAIN - FUNCTIONAL ASSESSMENT
PAIN_FUNCTIONAL_ASSESSMENT: 0-10
PAIN_FUNCTIONAL_ASSESSMENT: 0-10

## 2024-02-08 ASSESSMENT — PAIN SCALES - GENERAL
PAINLEVEL_OUTOF10: 2
PAINLEVEL_OUTOF10: 2

## 2024-02-08 ASSESSMENT — PAIN DESCRIPTION - DESCRIPTORS
DESCRIPTORS: ACHING;DISCOMFORT
DESCRIPTORS: ACHING;DISCOMFORT

## 2024-02-08 NOTE — PROGRESS NOTES
Occupational Therapy    Occupational Therapy Treatment  Re check     Name: Ariana Wills  MRN: [id number]  : 1955  Date: 24  Time Calculation  Start Time: 1035  Stop Time: 1131  Time Calculation (min): 56 min    Assessment: see note      Plan:  Continue with POC as tolerated, monitor home program, assess new garments,           Subjective   General:  OT Last Visit  OT Received On: 23       Objective       Therapy/Activity:       re check completed with increased endurance to perform IADLs of pt choice, volume reduction noted. I with self MLD. rec pt to bring night time garments inelastic wraps for thigh reduction. will continue therapy to address and then DC. pt in agreement      girth measurements with volume reduction noted      Lymphedema Assessment    Lymphedema Assessments: see flow sheet for total            Pain Assessment: 2/10 BL legs      Therapy Precautions: LRAD at needed          OP EDUCATION:       Goals:  Active       OT/lymphedema goals        Pt will be I with stating and demonstrating home exercise program in order to improve patients ability to perform self-care, household, work and/or leisure tasks.  (Progressing)       Start:  10/10/23    Expected End:  24            Pt will be able to perform self care, household, work and or leisure tasks with   0-2/10 pain rating, 100 % of the time  (Progressing)       Start:  10/10/23    Expected End:  24            Pt will demo 5% cm  of volume reduction with BLE in order for proper fitting of medical compression garments and increase ease in transfers and IADLs  (Progressing)       Start:  10/10/23    Expected End:  24            Pt will I perform skin care for infection prevention and integrity of skin  (Progressing)       Start:  10/10/23    Expected End:  24            I with donning/doffing medical compression garments with AE as needed  (Progressing)       Start:  10/10/23    Expected End:  24

## 2024-02-09 ENCOUNTER — HOSPITAL ENCOUNTER (OUTPATIENT)
Dept: GASTROENTEROLOGY | Facility: HOSPITAL | Age: 69
Discharge: HOME | End: 2024-02-09
Payer: MEDICARE

## 2024-02-09 VITALS
SYSTOLIC BLOOD PRESSURE: 119 MMHG | OXYGEN SATURATION: 95 % | WEIGHT: 293 LBS | RESPIRATION RATE: 18 BRPM | HEIGHT: 66 IN | BODY MASS INDEX: 47.09 KG/M2 | DIASTOLIC BLOOD PRESSURE: 53 MMHG | TEMPERATURE: 96.8 F | HEART RATE: 50 BPM

## 2024-02-09 DIAGNOSIS — M17.0 PRIMARY OSTEOARTHRITIS OF BOTH KNEES: ICD-10-CM

## 2024-02-09 PROCEDURE — 20611 DRAIN/INJ JOINT/BURSA W/US: CPT | Performed by: PHYSICAL MEDICINE & REHABILITATION

## 2024-02-09 PROCEDURE — 2500000005 HC RX 250 GENERAL PHARMACY W/O HCPCS: Performed by: PHYSICAL MEDICINE & REHABILITATION

## 2024-02-09 PROCEDURE — 20611 DRAIN/INJ JOINT/BURSA W/US: CPT | Mod: 50 | Performed by: PHYSICAL MEDICINE & REHABILITATION

## 2024-02-09 PROCEDURE — 2500000004 HC RX 250 GENERAL PHARMACY W/ HCPCS (ALT 636 FOR OP/ED): Performed by: PHYSICAL MEDICINE & REHABILITATION

## 2024-02-09 RX ORDER — LIDOCAINE HYDROCHLORIDE 5 MG/ML
INJECTION, SOLUTION INFILTRATION; INTRAVENOUS AS NEEDED
Status: COMPLETED | OUTPATIENT
Start: 2024-02-09 | End: 2024-02-09

## 2024-02-09 RX ORDER — METHYLPREDNISOLONE ACETATE 40 MG/ML
INJECTION, SUSPENSION INTRA-ARTICULAR; INTRALESIONAL; INTRAMUSCULAR; SOFT TISSUE AS NEEDED
Status: COMPLETED | OUTPATIENT
Start: 2024-02-09 | End: 2024-02-09

## 2024-02-09 RX ADMIN — LIDOCAINE HYDROCHLORIDE 10 ML: 5 INJECTION, SOLUTION INFILTRATION at 09:39

## 2024-02-09 RX ADMIN — METHYLPREDNISOLONE ACETATE 40 MG: 40 INJECTION, SUSPENSION INTRA-ARTICULAR; INTRALESIONAL; INTRAMUSCULAR; SOFT TISSUE at 09:35

## 2024-02-09 RX ADMIN — METHYLPREDNISOLONE ACETATE 40 MG: 40 INJECTION, SUSPENSION INTRA-ARTICULAR; INTRALESIONAL; INTRAMUSCULAR; SOFT TISSUE at 09:39

## 2024-02-09 RX ADMIN — LIDOCAINE HYDROCHLORIDE 10 ML: 5 INJECTION, SOLUTION INFILTRATION at 09:35

## 2024-02-09 ASSESSMENT — PAIN SCALES - GENERAL
PAINLEVEL_OUTOF10: 5 - MODERATE PAIN
PAINLEVEL_OUTOF10: 0 - NO PAIN

## 2024-02-09 ASSESSMENT — PAIN DESCRIPTION - DESCRIPTORS: DESCRIPTORS: THROBBING

## 2024-02-09 ASSESSMENT — PAIN - FUNCTIONAL ASSESSMENT: PAIN_FUNCTIONAL_ASSESSMENT: 0-10

## 2024-02-09 NOTE — DISCHARGE INSTRUCTIONS
You had a pain management procedure today.    Observe/ monitor for the following signs & symptoms:  If you notice Excessive bleeding (slow general oozing that completely soaks the dressing, or fresh bright red bleeding).   In either case, apply pressure to the area, elevate it if possible & call your doctor at once.    Also observe for:  Change in color  Numbness/tingling  Coldness to the touch  Swelling  Drainage  Temperature of 101.5 or higher.  Increased, uncontrollable pain.    *If you notice the above signs & symptoms, please call your doctor right away!*      Discharge Instructions:    Your pain may not be gone immediately after this procedure; it generally takes 3 to 5 days for the steroid to work.   Keep the needle site clean & dry for 24 hours.  Continue your present medications.  Make an appointment to see your doctor in 2-3 weeks.  If any problems occur, or if you have any further questions, please call as soon as possible. If you find that you cannot reach your doctor, but feel that the condition nees a doctor's attention, go to the closest emergency department & take this discharge paper with you.       Dr. Estrella's Office: [phone]

## 2024-02-09 NOTE — PROCEDURES
Joint Aspiration/Injection    Date/Time: 2/9/2024 9:42 AM    Performed by: Scott Estrella MD  Authorized by: Scott Estrella MD    Consent:     Consent obtained:  Written    Consent given by:  Patient    Risks, benefits, and alternatives were discussed: yes      Risks discussed:  Bleeding, infection, pain and nerve damage    Alternatives discussed:  No treatment, delayed treatment and alternative treatment  Universal protocol:     Procedure explained and questions answered to patient or proxy's satisfaction: yes      Relevant documents present and verified: yes      Test results available: yes      Imaging studies available: yes      Required blood products, implants, devices, and special equipment available: yes      Site/side marked: yes      Immediately prior to procedure, a time out was called: yes      Patient identity confirmed:  Verbally with patient, hospital-assigned identification number and arm band  Comments:      Knee Injection    The patient was positioned comfortably in the supine position with the knee bent and was prepped and draped in the usual sterile manner.  Sterile precautions, including sterile gloves, disposable cap and masks were worn for the procedure at all times. The knee was palpated carefully making sure to isolate the patella and femoral-tibial joint line.  Skeletal and soft tissue landmarks were identified under ultrasound.  There was no evidence of infection at the site(s) of needle insertion.  A final time-out was performed.  The procedure needle was advanced using ultrasound guidance.  Once the final target was reached, saline or 1% lidocaine was injected into the suprapatellar pouch, verifying location.  The therapeutic solution was injected slowly while periodically aspirating to make sure there was no intravascular spread.  The needle was flushed and/or restyletted and removed.  Pressure was held over the site, and after ensuring hemostasis and the absence of hematoma, an  adhesive bandage was applied to the site of needle insertion.    Laterality:   [Bilateral]  Medication(s):  [6mL 0.5% lidocaine] [80 mg methylprednisolone] divided between sides

## 2024-02-09 NOTE — H&P
Patient ID: Patient with bilateral knee osteoarthritis who presents for the scheduled procedure.  No changes since last visit      Patient denies any recent antibiotic use or infections, denies any blood thinner use, and denies contrast or local anesthetic allergies           GENERAL EXAM  Vital Signs: Vital signs to include heart rate, respiration rate, blood pressure, and temperature were reviewed.  General Appearance:  Awake, alert, healthy appearing, well developed, No acute distress.  Head: Normocephalic without evidence of head injury.  Neck: The appearance of the neck was normal without swelling with a midline trachea.  Eyes: The eyelids and eyebrows exhibited no abnormalities.  Pupils were not pin-point.  Sclera was without icterus.  Lungs: Respiration rhythm and depth was normal.  Respiratory movements were normal without labored breathing.  Cardiovascular: No peripheral edema was present.    Neurological: Patient was oriented to time, place, and person.  Speech was normal.  Balance, gait, and stance were unremarkable.    Psychiatric: Appearance was normal with appropriate dress.  Mood was euthymic and affect was normal.  Skin: Affected regions were without ecchymosis or skin lesions.        Physical exam as above except:        Assessment/Plan    Patient with bilateral knee osteoarthritis here for bilateral knee injections under ultrasound guidance

## 2024-02-15 ENCOUNTER — LAB (OUTPATIENT)
Dept: LAB | Facility: LAB | Age: 69
End: 2024-02-15
Payer: MEDICARE

## 2024-02-15 ENCOUNTER — TREATMENT (OUTPATIENT)
Dept: OCCUPATIONAL THERAPY | Facility: CLINIC | Age: 69
End: 2024-02-15
Payer: MEDICARE

## 2024-02-15 ENCOUNTER — OFFICE VISIT (OUTPATIENT)
Dept: PHYSICAL MEDICINE AND REHAB | Facility: CLINIC | Age: 69
End: 2024-02-15
Payer: MEDICARE

## 2024-02-15 VITALS
HEIGHT: 66 IN | SYSTOLIC BLOOD PRESSURE: 133 MMHG | WEIGHT: 293 LBS | DIASTOLIC BLOOD PRESSURE: 54 MMHG | HEART RATE: 48 BPM | BODY MASS INDEX: 47.09 KG/M2

## 2024-02-15 DIAGNOSIS — R20.2 NUMBNESS AND TINGLING SENSATION OF SKIN: Primary | ICD-10-CM

## 2024-02-15 DIAGNOSIS — R20.2 NUMBNESS AND TINGLING SENSATION OF SKIN: ICD-10-CM

## 2024-02-15 DIAGNOSIS — R20.0 NUMBNESS AND TINGLING SENSATION OF SKIN: Primary | ICD-10-CM

## 2024-02-15 DIAGNOSIS — R20.0 NUMBNESS AND TINGLING SENSATION OF SKIN: ICD-10-CM

## 2024-02-15 DIAGNOSIS — R29.898 WEAKNESS OF BOTH LOWER EXTREMITIES: ICD-10-CM

## 2024-02-15 DIAGNOSIS — M79.604 PAIN IN BOTH LOWER EXTREMITIES: ICD-10-CM

## 2024-02-15 DIAGNOSIS — M79.605 PAIN IN BOTH LOWER EXTREMITIES: ICD-10-CM

## 2024-02-15 DIAGNOSIS — I89.0 LYMPHEDEMA OF BOTH LOWER EXTREMITIES: Primary | ICD-10-CM

## 2024-02-15 LAB
TSH SERPL-ACNC: 2.24 MIU/L (ref 0.44–3.98)
VIT B12 SERPL-MCNC: 328 PG/ML (ref 211–911)

## 2024-02-15 PROCEDURE — 1036F TOBACCO NON-USER: CPT | Performed by: PHYSICAL MEDICINE & REHABILITATION

## 2024-02-15 PROCEDURE — 36415 COLL VENOUS BLD VENIPUNCTURE: CPT

## 2024-02-15 PROCEDURE — 3075F SYST BP GE 130 - 139MM HG: CPT | Performed by: PHYSICAL MEDICINE & REHABILITATION

## 2024-02-15 PROCEDURE — 84443 ASSAY THYROID STIM HORMONE: CPT

## 2024-02-15 PROCEDURE — 97140 MANUAL THERAPY 1/> REGIONS: CPT | Mod: GO

## 2024-02-15 PROCEDURE — 97535 SELF CARE MNGMENT TRAINING: CPT | Mod: GO

## 2024-02-15 PROCEDURE — 1160F RVW MEDS BY RX/DR IN RCRD: CPT | Performed by: PHYSICAL MEDICINE & REHABILITATION

## 2024-02-15 PROCEDURE — 1159F MED LIST DOCD IN RCRD: CPT | Performed by: PHYSICAL MEDICINE & REHABILITATION

## 2024-02-15 PROCEDURE — 3078F DIAST BP <80 MM HG: CPT | Performed by: PHYSICAL MEDICINE & REHABILITATION

## 2024-02-15 PROCEDURE — 82607 VITAMIN B-12: CPT

## 2024-02-15 PROCEDURE — 1125F AMNT PAIN NOTED PAIN PRSNT: CPT | Performed by: PHYSICAL MEDICINE & REHABILITATION

## 2024-02-15 PROCEDURE — 99214 OFFICE O/P EST MOD 30 MIN: CPT | Performed by: PHYSICAL MEDICINE & REHABILITATION

## 2024-02-15 ASSESSMENT — PAIN SCALES - GENERAL: PAINLEVEL_OUTOF10: 4

## 2024-02-15 ASSESSMENT — PAIN DESCRIPTION - DESCRIPTORS: DESCRIPTORS: DISCOMFORT;ACHING;SHARP

## 2024-02-15 ASSESSMENT — PAIN - FUNCTIONAL ASSESSMENT: PAIN_FUNCTIONAL_ASSESSMENT: 0-10

## 2024-02-15 ASSESSMENT — ACTIVITIES OF DAILY LIVING (ADL): HOME_MANAGEMENT_TIME_ENTRY: 32

## 2024-02-15 NOTE — PROGRESS NOTES
ref by Dr. Garza for back and leg pain and weakness.      History of Present IllnessPhysical Medicine and Rehabilitation [facility] fu     Chief Complaint:  Low back pain     HPI:  Ms. Wills is a 67 yo F w pmh of obesity, lymphedema, lipedema, hypothyroidism, gerd presenting with back and hip pain.     Recall  Onset: progressively worse in last 15 years  Location: across low back   Radiation: bilateral, worse on R, R lateral leg numbness ad in the calf  Quality: sharp  Duration: comes and goes  Aggravating factors: standing and walking, standing is worse  walking causes cramping in the legs and across the back  Uses a cane   Alleviating factors: laying down  Severity: [5/10] today, [10/10] most severe  Numbness/Tingling: yes idiopathic neuropathy in feet and hands  Bowel or bladder incontinence: yes overactive, bowel depends on food  Pt's current medication regimen includes: advil prn helps but cant take it renal insufficiency  tylen w some relief but less than advil     Treatment to date:  Physical therapy: none  Medications taken to date for this complaint include the following:   as above  Prior injections: 15 years ago not sure   ? nerve blocks      She was last seen in June 2023. at that time we discussed:  Back pain; likely spinal stenosis and facet arthropathy  - xr L spine, xr hips  - PT for core rom hep Le strengthening  - discussing swimming for exercise  - gabapentin 300 mg bid titration explained, renal dosing  - no nsaids given renal insufficiency    Limpholipedema  - Lymphedema therapy; would need custom garments, pump  - tsh, bmp wnl except renal insufficiency    Had lymphedema eval and now pending fu appts.  pain in low back on the right, worse w standing in one spot needs to lean forward   is better.  Gabapentin is helping sleep at night.     Gabapentin 300 mg bid not sure re back,.        Received jaycee pants Sigvaris 10-15 mmhgh. Waiting for velcro wraps.   Waiting for pump.  States ankles are dowb but  the front of the shins., Feet are down. Thighs fluctuate.  Started some exercises, takes 24-48 hrs to recover, while she is doing them its ok.      She still goes to lymphedema therapy once a week. She has biker shorts. Velcro garments for below the knees and wears them during the day and then pending thigh velcro.   She has a pump at home does once a day. States pump hurts the knees.  Pain in both knees in doing back therapy.  L spine is much better. More strengthen walking.  Knee pain anterior L on the R general, pain w straightening. Takes tylenol and alternates ibuprofen.  Lidocaine patches takes the edge off.  Continues w gabapentin bid.      She was last seen in October 2023. At that time we discussed:  Back pain; likely spinal stenosis and facet arthropathy  - xr L spine reviewed personally shows L5/s1 degenerative disc disease,  xr hips w mild oa.  reviewed w patient and on personal review mild hip oa and extensive spondylosis  - water therapy for core rom hep Le strengthening; continue and pain improving  - discussing swimming for exercise  - gabapentin 300 mg bid  renal fx borderline, not sure if helping discussed to titrate down to one and off and see if makes a difference   - no nsaids given renal insufficiency    Lympholipedema  - Lymphedema therapycontinue  - able to do pump daily but not twice a day due to bulkiness  - has below knee velcro wraps, pending thigh wraps  - has received jaycee pants    - tsh, bmp wnl except renal insufficiency    Knee pain  - likely oa and obesity related  - xray knees  - will refer to pain management for fluoroscopy guided steroid joint injections      Just had knee injections; a little better some times pain still shoots. Overall some improvement.  Completed therapy water in end of Dec. Joined [employer],. She is walking more.   Legs are stronger. Water therapy for back and knees. Overall feels better. R hip intermittent pain.  When gets up at night when first bear  weight on it.   Has compression knees highs during the day, jobst 20-30 mmhg.  Doing pump once a day.  She was doing 45 mmhg. Might go to 50 mmhg. Sleeps in velcro wraps. Wears thigh pieces in the top.  Continues w gabapentin bid.      Numbness in the feet today. Was dx before w neuropathy in hands and feet. Idiopathic. Comes and goes.     Injections:    Bl us guided steroid injection by pain management 2/2024 : some relief so far    Imaging  Reviewed 02/15/24 personally  Xr bl knee pain 12/2024  FINDINGS:  Advanced osteoarthritis left knee worst medially.      Advanced tricompartmental osteoarthritis right knee also worst  medially.      No fracture or lesion bilaterally.      IMPRESSION:  Advanced osteoarthritis bilateral knees worst in the medial  compartments    Xr pelvis 6/2023  FINDINGS:  No fracture or dislocation is evident.Mild degenerative changes seen  of the hips. Mild degenerative changes seen of the SI joints and the  pubic symphysis. Vascular calcifications are seen over the soft  tissues.     IMPRESSION:  Mild degenerative change of the hips without osseous injury evident.  Xr l spine 6/2023    FINDINGS:  There are  5 lumbar type vertebral bodies. There is grade 1  anterolisthesis of L3 on L4 and L4 on L5. Vertebral body height and  alignment  are otherwise maintained.  No fracture or dislocation is  evident.  There is severe L3-4 through L5-S1 facet degenerative  change bilaterally. There is severe L5-S1 discogenic degenerative  change. Mild discogenic and facet degenerative changes seen at other  levels of the visualized spine. Mild degenerative changes seen of the  sacroiliac joints. Vascular calcifications and surgical changes are  seen over the soft tissues.     IMPRESSION:  1. Multilevel spondylolisthesis.  2. Degenerative change as above.     [PMH]  renal insufficiency  hypothyroidism  HTN  GERD  HLP  PVCs          [SOCHX]  retired  used to work at as a unit clerk  lives w   social  "alcohol  no smoking or drug use     Review of Systems:  Constitutional: Denies fever or chills, malaise, weight changes.   Eyes: Denies change in visual acuity   HENT: Denies nasal congestion or sore throat   Respiratory: Denies cough or shortness of breath   Cardiovascular: Denies chest pain or edema   GI: Denies abdominal pain, nausea, vomiting, bloody stools or diarrhea   : Denies dysuria   Integument: Denies rash   Neurologic: As per HPI  MSK: Per above HPI  Endocrine: Denies polyuria or polydipsia   Lymphatic: Denies swollen glands   Psychiatric: Denies depression or anxiety           PHYSICAL EXAM:  /54   Pulse (!) 48   Ht 1.676 m (5' 6\")   Wt 144 kg (318 lb)   LMP  (LMP Unknown)   BMI 51.33 kg/m²     General: NAD, obese  Psychiatric: appropriate mood & affect.   Cardiovascular: Normal pedal pulses; no LE edema.  Respiratory: Normal rate; unlabored breathing.  Skin: disal erythema and sensitivity bl  Lymphatic: LE lympho and lipedema  NEURO: Alert and appropriate. Speech fluent, conversing appropriately.   Motor:   Rt: HF 5/5, KE 5/5, KF 5/5, DF 5/5, EHL 5/5, PF 5/5.   Lt: HF 5/5, KE 5/5, KF 5/5, DF 5/5, EHL 5/5, PF 5/5.  Sensation:    Light touch: intact in the b/l L3-S1 dermatomes.   PP: intact in the b/l L3-S1 dermatomes  Reflexes:    Right: patellar 2+, achilles 2+,    Left: patellar 2+, achilles 2+,    Babinski's downgoing b/l; no clonus  Gait:wide based slow   MSK:  Inspection reveals no evidence of a pelvic obliqity   Spinal range of motion: Flexion to 90° degrees, Extension of 30 degrees.  There is tenderness over the lumbar paraspinals       Ttp anterior and medial joint  Flexion 80, ext -10   Antalgic gait       Impression: Ms. Wills is a 69 yo F w pmh of obesity, lymphedema, lipedema, hypothyroidism, gerd presenting with back and hip pain. 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 Made great progress in lymphedema therapy below the knees, but " around knees and thighs still significant swelling. Back pain improving w therapy. She is overall more active that she has been in years. Knees are also improving after steroid injections.. New one day worsening of foot numbness.     Plan:    Orders Placed This Encounter   Procedures    Vitamin B12    TSH with reflex to Free T4 if abnormal        Back pain; likely spinal stenosis and facet arthropathy  - xr L spine reviewed personally shows L5/s1 degenerative disc disease,  xr hips w mild oa.  reviewed w patient and on personal review mild hip oa and extensive spondylosis  - water therapy for core rom hep Le strengthening; continue and pain improving  - discussing swimming for exercise  - gabapentin 300 mg bid  renal fx borderline, not sure if helping discussed to titrate down to one and off and see if makes a difference   - no nsaids given renal insufficiency    Lympholipedema  - Lymphedema therapycontinue  - able to do pump daily,going between 40-50 of pressure but tolerating well.  - has below knee velcro wraps, pending thigh wraps  - has received jaycee pants    - tsh, bmp wnl except renal insufficiency    Knee pain  - likely oa and obesity related  - xray knees reviewed w severe arthritis  - sp refer to pain management for us guided steroid joint injections; 6 days ago so far some relief     Numbness in feet, ho idiopathic neuropathy  - intermittent but more severe in last day  - will monitor  - could also be related to spine  - b12 and vitamin d    fu 12 weeks     Louise Patel MD  Physical Medicine and Rehabilitation

## 2024-02-15 NOTE — PROGRESS NOTES
"Occupational Therapy    Occupational Therapy Treatment    Name: Ariana Wills  MRN: [id number]  : 1955  Date: 02/15/24  Time Calculation  Start Time: 1301  Stop Time: 1358  Time Calculation (min): 57 min    Assessment:  garment fitting and education and training      Plan: Continue with POC as tolerated, monitor home program, assess new garments,           Subjective   \" I brought my new garments in \"   General:  OT Last Visit  OT Received On: 24       Objective         Therapy/Activity:      Therapeutic Activity  Therapeutic Activity Performed: Yes  Therapeutic Activity 1: Pt arrived with new J[employer] essentials for BL upper leg and knee with custom fitting and edu for donning with proper tension on straps in order to promote lymph reduciton.  Pt able to demo with intermittent VCing for accuracy.    Manual Therapy  Manual Therapy Performed: Yes  Manual Therapy Activity 1: girth measurements with volume reduciton noted BLEs,     Lymphedema Assessment    Lymphedema Assessments:  Lymphedema Assessments: Lower extremities     Right Lower Extremity:  R Metatarsal (cm): 21 cm  R Heel Y Angle: 35 cm  R Ankle (cm): 28 cm  R 10 cm Above Ankle (cm): 40.4 cm  R 20 cm Above Ankle (cm): 56.5 cm  R 30 cm Above Ankle (cm): 60 cm  R 40 cm Above Ankle (cm): 0 cm  R Knee (cm): 61 cm  R 10 cm Above Knee (cm): 77.4 cm  R 20 cm Above Knee (cm): 80.7 cm  R 30 cm Above Knee (cm): 0 cm  R 40 cm Above Knee (cm): 0 cm  R 50 cm Above Knee (cm): 0 cm  Right lower extremity total: 460  Left Lower Extremity:  L Metatarsal (cm): 22.5 cm  L Heel Y Angle: 36 cm  L Ankle (cm): 29.5 cm  L 10 cm Above Ankle (cm): 44.5 cm  L 20 cm Above Ankle (cm): 57.5 cm  L 30 cm Above Ankle (cm): 57 cm  L 40 cm Above Ankle (cm): 0 cm  L Knee (cm): 59 cm  L 10 cm Above Knee (cm): 77 cm  L 20 cm Above Knee (cm): 80 cm  L 30 cm Above Knee (cm): 0 cm  L 40 cm Above Knee (cm): 0 cm  L 50 cm Above Knee (cm): 0 cm  Left Lower extremity total: 463     Pain " Assessment:  Pain Assessment: 0-10  Pain Score: 4  Pain Type: Chronic pain  Pain Location: Knee  Pain Orientation: Left  Pain Descriptors: Discomfort, Aching, Sharp  Pain Frequency: Intermittent  Therapy Precautions:  Precautions Comment: NA      OP EDUCATION:       Goals:  Active       OT/lymphedema goals        Pt will be I with stating and demonstrating home exercise program in order to improve patients ability to perform self-care, household, work and/or leisure tasks.  (Progressing)       Start:  10/10/23    Expected End:  03/29/24            Pt will be able to perform self care, household, work and or leisure tasks with   0-2/10 pain rating, 100 % of the time  (Progressing)       Start:  10/10/23    Expected End:  03/29/24            Pt will demo 5% cm  of volume reduction with BLE in order for proper fitting of medical compression garments and increase ease in transfers and IADLs  (Progressing)       Start:  10/10/23    Expected End:  03/29/24            Pt will I perform skin care for infection prevention and integrity of skin  (Progressing)       Start:  10/10/23    Expected End:  03/29/24            I with donning/doffing medical compression garments with AE as needed  (Progressing)       Start:  10/10/23    Expected End:  03/29/24

## 2024-02-22 ENCOUNTER — TREATMENT (OUTPATIENT)
Dept: OCCUPATIONAL THERAPY | Facility: CLINIC | Age: 69
End: 2024-02-22
Payer: MEDICARE

## 2024-02-22 DIAGNOSIS — R29.898 WEAKNESS OF BOTH LOWER EXTREMITIES: ICD-10-CM

## 2024-02-22 DIAGNOSIS — M79.604 PAIN IN BOTH LOWER EXTREMITIES: ICD-10-CM

## 2024-02-22 DIAGNOSIS — I89.0 LYMPHEDEMA OF BOTH LOWER EXTREMITIES: Primary | ICD-10-CM

## 2024-02-22 DIAGNOSIS — R60.9 LIPEDEMA: ICD-10-CM

## 2024-02-22 DIAGNOSIS — M79.605 PAIN IN BOTH LOWER EXTREMITIES: ICD-10-CM

## 2024-02-22 PROCEDURE — 97535 SELF CARE MNGMENT TRAINING: CPT | Mod: GO

## 2024-02-22 PROCEDURE — 97140 MANUAL THERAPY 1/> REGIONS: CPT | Mod: GO

## 2024-02-22 ASSESSMENT — ACTIVITIES OF DAILY LIVING (ADL): HOME_MANAGEMENT_TIME_ENTRY: 18

## 2024-02-22 ASSESSMENT — PAIN - FUNCTIONAL ASSESSMENT: PAIN_FUNCTIONAL_ASSESSMENT: 0-10

## 2024-02-22 ASSESSMENT — PAIN DESCRIPTION - DESCRIPTORS: DESCRIPTORS: DISCOMFORT;ACHING

## 2024-02-22 ASSESSMENT — PAIN SCALES - GENERAL: PAINLEVEL_OUTOF10: 5 - MODERATE PAIN

## 2024-02-22 NOTE — PROGRESS NOTES
Occupational Therapy    Occupational Therapy Treatment    Name: Ariana Wills  MRN: [id number]  : 1955  Date: 24  Time Calculation  Start Time: 858  Stop Time: 939  Time Calculation (min): 41 min    Assessment:  garment edu and fitting      Plan:  wait for new flat knit for assessment        Subjective   General:  OT Last Visit  OT Received On: 02/15/24     Precautions:  Precautions Comment: NA  Pain Assessment:  Pain Assessment  Pain Assessment: 0-10  Pain Score: 5 - Moderate pain  Pain Type: Chronic pain  Pain Location: Neck  Pain Orientation: Left  Pain Descriptors: Discomfort, Aching  Pain Frequency: Intermittent    Objective       Therapy/Activity: Therapeutic Exercise  Therapeutic Exercise Performed: Yes  Therapeutic Exercise Activity 1: hip fall outs with bracing for LB support 10 reps 5 sec hold with 1 Le at a time and together    Therapeutic Activity  Therapeutic Activity Performed: Yes  Therapeutic Activity 1: Due to reshaping with BLES and tissue pliability changes. pt required 1 LLE and 1 RLE flat knit closed toe, T heel with silicone band  knee high class II from [employer] for wall stability on each LE and small lobules at Bl ankles.  Therapeutic Activity 2: completed edu with use of IT Stays around top of garment and velcro for anchoring and stability of garment    Manual Therapy  Manual Therapy Performed: Yes  Manual Therapy Activity 1: girth measurements with volume reduciton noted BLEs,  Lymphedema Assessment    Lymphedema Assessments:  Lymphedema Assessments: Lower extremities     Right Lower Extremity:  R Metatarsal (cm): 22 cm  R Heel Y Angle: 35 cm  R Ankle (cm): 28.8 cm  R 10 cm Above Ankle (cm): 39.5 cm  R 20 cm Above Ankle (cm): 53 cm  R 30 cm Above Ankle (cm): 59 cm  R 40 cm Above Ankle (cm): 0 cm  R Knee (cm): 61 cm  R 10 cm Above Knee (cm): 78 cm  R 20 cm Above Knee (cm): 80.5 cm  R 30 cm Above Knee (cm): 0 cm  R 40 cm Above Knee (cm): 0 cm  R 50 cm Above Knee (cm): 0  cm  Right lower extremity total: 456.8  Left Lower Extremity:  L Metatarsal (cm): 22 cm  L Heel Y Angle: 35.5 cm  L Ankle (cm): 30 cm  L 10 cm Above Ankle (cm): 41 cm  L 20 cm Above Ankle (cm): 56 cm  L 30 cm Above Ankle (cm): 57.5 cm  L 40 cm Above Ankle (cm): 0 cm  L Knee (cm): 57.5 cm  L 10 cm Above Knee (cm): 77.5 cm  L 20 cm Above Knee (cm): 80 cm  L 30 cm Above Knee (cm): 0 cm  L 40 cm Above Knee (cm): 0 cm  L 50 cm Above Knee (cm): 0 cm  Left Lower extremity total: 457     Pain Assessment:  Pain Assessment: 0-10  Pain Score: 5 - Moderate pain  Pain Type: Chronic pain  Pain Location: Neck  Pain Orientation: Left  Pain Descriptors: Discomfort, Aching  Pain Frequency: Intermittent  Therapy Precautions:  Precautions Comment: NA    OP EDUCATION:       Goals:  Active       OT/lymphedema goals        Pt will be I with stating and demonstrating home exercise program in order to improve patients ability to perform self-care, household, work and/or leisure tasks.  (Progressing)       Start:  10/10/23    Expected End:  03/29/24            Pt will be able to perform self care, household, work and or leisure tasks with   0-2/10 pain rating, 100 % of the time  (Progressing)       Start:  10/10/23    Expected End:  03/29/24            Pt will demo 5% cm  of volume reduction with BLE in order for proper fitting of medical compression garments and increase ease in transfers and IADLs  (Progressing)       Start:  10/10/23    Expected End:  03/29/24            Pt will I perform skin care for infection prevention and integrity of skin  (Progressing)       Start:  10/10/23    Expected End:  03/29/24            I with donning/doffing medical compression garments with AE as needed  (Progressing)       Start:  10/10/23    Expected End:  03/29/24

## 2024-02-28 ENCOUNTER — APPOINTMENT (OUTPATIENT)
Dept: OCCUPATIONAL THERAPY | Facility: CLINIC | Age: 69
End: 2024-02-28
Payer: MEDICARE

## 2024-03-04 ENCOUNTER — APPOINTMENT (OUTPATIENT)
Dept: PAIN MEDICINE | Facility: HOSPITAL | Age: 69
End: 2024-03-04
Payer: MEDICARE

## 2024-03-04 DIAGNOSIS — M79.605 PAIN IN BOTH LOWER EXTREMITIES: ICD-10-CM

## 2024-03-04 DIAGNOSIS — M79.604 PAIN IN BOTH LOWER EXTREMITIES: ICD-10-CM

## 2024-03-04 DIAGNOSIS — R29.898 WEAKNESS OF BOTH LOWER EXTREMITIES: ICD-10-CM

## 2024-03-04 DIAGNOSIS — I89.0 LYMPHEDEMA OF BOTH LOWER EXTREMITIES: Primary | ICD-10-CM

## 2024-03-05 ENCOUNTER — APPOINTMENT (OUTPATIENT)
Dept: PAIN MEDICINE | Facility: HOSPITAL | Age: 69
End: 2024-03-05
Payer: MEDICARE

## 2024-03-05 DIAGNOSIS — K21.9 GASTRO-ESOPHAGEAL REFLUX DISEASE WITHOUT ESOPHAGITIS: ICD-10-CM

## 2024-03-07 RX ORDER — FAMOTIDINE 20 MG/1
20 TABLET, FILM COATED ORAL NIGHTLY
Qty: 90 TABLET | Refills: 1 | Status: SHIPPED | OUTPATIENT
Start: 2024-03-07

## 2024-03-13 ENCOUNTER — APPOINTMENT (OUTPATIENT)
Dept: PAIN MEDICINE | Facility: HOSPITAL | Age: 69
End: 2024-03-13
Payer: MEDICARE

## 2024-04-02 ENCOUNTER — OFFICE VISIT (OUTPATIENT)
Dept: PAIN MEDICINE | Facility: HOSPITAL | Age: 69
End: 2024-04-02
Payer: MEDICARE

## 2024-04-02 VITALS
WEIGHT: 293 LBS | SYSTOLIC BLOOD PRESSURE: 123 MMHG | OXYGEN SATURATION: 95 % | HEIGHT: 66 IN | RESPIRATION RATE: 16 BRPM | HEART RATE: 51 BPM | BODY MASS INDEX: 47.09 KG/M2 | DIASTOLIC BLOOD PRESSURE: 54 MMHG

## 2024-04-02 DIAGNOSIS — M17.0 PRIMARY OSTEOARTHRITIS OF BOTH KNEES: ICD-10-CM

## 2024-04-02 DIAGNOSIS — G89.29 CHRONIC PAIN OF BOTH KNEES: ICD-10-CM

## 2024-04-02 DIAGNOSIS — M25.561 CHRONIC PAIN OF BOTH KNEES: ICD-10-CM

## 2024-04-02 DIAGNOSIS — Z02.89 MEDICATION MANAGEMENT CONTRACT AGREEMENT: Primary | ICD-10-CM

## 2024-04-02 DIAGNOSIS — M25.562 CHRONIC PAIN OF BOTH KNEES: ICD-10-CM

## 2024-04-02 PROCEDURE — 3074F SYST BP LT 130 MM HG: CPT | Performed by: CLINICAL NURSE SPECIALIST

## 2024-04-02 PROCEDURE — 99214 OFFICE O/P EST MOD 30 MIN: CPT | Performed by: CLINICAL NURSE SPECIALIST

## 2024-04-02 PROCEDURE — 1160F RVW MEDS BY RX/DR IN RCRD: CPT | Performed by: CLINICAL NURSE SPECIALIST

## 2024-04-02 PROCEDURE — 1159F MED LIST DOCD IN RCRD: CPT | Performed by: CLINICAL NURSE SPECIALIST

## 2024-04-02 PROCEDURE — 3078F DIAST BP <80 MM HG: CPT | Performed by: CLINICAL NURSE SPECIALIST

## 2024-04-02 PROCEDURE — 1036F TOBACCO NON-USER: CPT | Performed by: CLINICAL NURSE SPECIALIST

## 2024-04-02 ASSESSMENT — PATIENT HEALTH QUESTIONNAIRE - PHQ9
SUM OF ALL RESPONSES TO PHQ9 QUESTIONS 1 AND 2: 0
2. FEELING DOWN, DEPRESSED OR HOPELESS: NOT AT ALL
1. LITTLE INTEREST OR PLEASURE IN DOING THINGS: NOT AT ALL

## 2024-04-02 ASSESSMENT — COLUMBIA-SUICIDE SEVERITY RATING SCALE - C-SSRS
2. HAVE YOU ACTUALLY HAD ANY THOUGHTS OF KILLING YOURSELF?: NO
1. IN THE PAST MONTH, HAVE YOU WISHED YOU WERE DEAD OR WISHED YOU COULD GO TO SLEEP AND NOT WAKE UP?: NO
6. HAVE YOU EVER DONE ANYTHING, STARTED TO DO ANYTHING, OR PREPARED TO DO ANYTHING TO END YOUR LIFE?: NO

## 2024-04-02 ASSESSMENT — ENCOUNTER SYMPTOMS
LOSS OF SENSATION IN FEET: 0
OCCASIONAL FEELINGS OF UNSTEADINESS: 1
DEPRESSION: 0

## 2024-04-02 NOTE — PROGRESS NOTES
Subjective   Patient ID: Ariana Wills is a 68 y.o. female who presents for knee pain  HPI    68-year-old female with history of obesity and lymphedema being followed by PM&R and referred to pain management for bilateral knee pain.  Knee x-ray consistent with advanced osteoarthritis bilateral knees worse in the medial compartment.  Patient was referred to pain management to try bilateral corticosteroid injection.  Managing pain with medication per PM&R with gabapentin and lidocaine patches.  She has previously done water therapy.  Also doing lymphedema therapy with occupational therapy.  No orthopedic surgery evaluation.  Patient presents at today's office visit to follow-up after recent bilateral corticosteroid injection on 2/9/2024.  Patient states that she received about 80% of relief in several weeks.  She states that pain in bilateral knees has returned.  Pain increases with standing and walking.  She states that her knee pain interferes with ADLs and limits her functional ability.  She does note some instability most noted in her left knee which is worse with walking.  Pain is aching and throbbing.  She has chronic neuropathy to her toes unchanged from previous exam.  Continues to manage her pain with gabapentin and lidocaine patches as well as occasional Tylenol.  She is not currently in physical therapy, however, she states that aqua therapy was well-tolerated and she felt helpful.  She would like to consider restarting aqua therapy.  Continuing lymphedema therapy.      Pt  bilateral knee pain, left worse than right. Described as grinding and stabbing pains, does not normally radiate without extensive activity. Patient states she is unable to fully extend legs due to pain.     Pain Score: 5-/10-sitting 8-walking    Injections/Procedures: 2/9/24 Bilateral knee injections 80% relief for 1.5 weeks    Neuromodulators/Other Medications: gabapentin 300 BID/ Tylenol    Other: no current PT     OARRS:  No data  recorded  I have personally reviewed the OARRS report for Ariana Wills. I have considered the risks of abuse, dependence, addiction and diversion    Is the patient prescribed a combination of a benzodiazepine and opioid?  No    Last Urine Drug Screen / ordered today: No  No results found for this or any previous visit (from the past 8760 hour(s)).  N/A    Controlled Substance Agreemnt:  Date of the Last Agreement:       Monitoring and compliance:    ORT:    PDUQ:    Office Agreement:      Review of Systems    ROS:   General: No fevers, chills, weight loss  Skin: Negative for lesions  Eyes: No acute vision changes  Ears: No vertigo  Nose, mouth, throat: No difficulty swallowing or speaking  Respiratory: No cough, shortness of breath, cyanosis  Cardiovascular: Negative for chest pain syncope or palpitation  Gastrointestinal: No constipation, nausea, vomiting  Neurological: Negative for headache, positive for: Chronic neuropathy bilateral feet  Psychological: Negative for severe or debilitating anxiety, depression. Negative memory loss  Musculoskeletal: Positive for arthralgia, myalgia and pain  Endocrine: Negative for weight gain, appetite changes, excessive sweating  Allergy/immune: Negative    All 13 systems were reviewed and are within normal levels except as noted or in the history of present illness.  Positive or pertinent negative responses are noted or were in the history of present illness. As noted, the patient denies significant or impairing weakness in the bilateral upper and lower extremities, medication induced constipation, and bowel or bladder incontinence.     Current Outpatient Medications:     acetaminophen (Tylenol) 500 mg tablet, Take by mouth every 6 hours if needed., Disp: , Rfl:     albuterol 90 mcg/actuation inhaler, Inhale., Disp: , Rfl:     atenolol (Tenormin) 100 mg tablet, TAKE 1 TABLET DAILY (Patient taking differently: Take 0.5 tablets (50 mg) by mouth once daily.), Disp: 90 tablet,  Rfl: 3    azelastine (Astelin) 137 mcg (0.1 %) nasal spray, Administer 2 sprays into each nostril 2 times a day., Disp: , Rfl:     cholecalciferol (Vitamin D-3) 5,000 Units tablet, Take 1 tablet (5,000 Units) by mouth once daily., Disp: , Rfl:     famotidine (Pepcid) 20 mg tablet, TAKE 1 TABLET BY MOUTH EVERYDAY AT BEDTIME, Disp: 90 tablet, Rfl: 1    flunisolide (Nasalide) 25 mcg (0.025 %) spray,non-aerosol, Administer into affected nostril(s)., Disp: , Rfl:     gabapentin (Neurontin) 300 mg capsule, TAKE 1 CAPSULE BY MOUTH TWICE A DAY, Disp: 60 capsule, Rfl: 3    glucosamine HCl-msm-chondroitn 750-375-400 mg tablet, Take by mouth., Disp: , Rfl:     hydroCHLOROthiazide (HYDRODiuril) 25 mg tablet, TAKE 1 TABLET DAILY, Disp: 90 tablet, Rfl: 3    levothyroxine (Synthroid, Levoxyl) 50 mcg tablet, TAKE 1 TABLET DAILY, Disp: 90 tablet, Rfl: 3    lisinopril 40 mg tablet, TAKE 1 TABLET DAILY, Disp: 90 tablet, Rfl: 3    multivitamin capsule, Take by mouth., Disp: , Rfl:     rosuvastatin (Crestor) 10 mg tablet, Take 1 tablet (10 mg) by mouth once daily at bedtime., Disp: , Rfl:      Past Medical History:   Diagnosis Date    Abnormal weight gain 05/04/2016    Weight gain    Candidiasis, unspecified 05/22/2015    Yeast infection    Cellulitis, unspecified 09/07/2014    Cellulitis    Encounter for screening for malignant neoplasm of cervix 05/22/2015    Pap smear for cervical cancer screening    Encounter for screening mammogram for malignant neoplasm of breast 05/22/2015    Visit for screening mammogram    Encounter for screening mammogram for malignant neoplasm of breast 05/22/2015    Visit for screening mammogram    Furuncle of buttock 09/18/2019    Boil, buttock    GERD (gastroesophageal reflux disease)     Hereditary and idiopathic neuropathy, unspecified 09/29/2013    Idiopathic peripheral neuropathy    Hypertension     Hypothyroid     Lymphedema, not elsewhere classified 05/22/2015    Lymphedema    Other symptoms and  signs involving the musculoskeletal system 05/03/2016    Complaints of leg weakness    Pain in left hip 05/03/2016    Hip pain, acute, left    Personal history of other diseases of the nervous system and sense organs     History of peripheral neuropathy    Personal history of other diseases of the respiratory system 04/10/2015    History of acute bronchitis    Personal history of other diseases of the respiratory system 06/22/2015    History of sinusitis    Personal history of other specified conditions 06/28/2016    History of dizziness    Personal history of other specified conditions 05/22/2015    History of chest pain    Sleep apnea     Unspecified asthma with (acute) exacerbation 05/03/2016    Asthma exacerbation    Weakness of trunk musculature 11/01/2023        Past Surgical History:   Procedure Laterality Date    CARDIAC CATHETERIZATION  04/03/2023    CARPAL TUNNEL RELEASE  09/27/2013    Neuroplasty Decompression Median Nerve At Carpal Tunnel    CHOLECYSTECTOMY  09/27/2013    Cholecystectomy    COLONOSCOPY      DILATION AND CURETTAGE OF UTERUS          Family History   Problem Relation Name Age of Onset    Colon cancer Mother      Other (cardiac disorder) Mother      Heart failure Father      Other (INTERNAL BLEEDING) Brother      Asthma Other      Heart disease Other      Hypertension Other      Sleep disorder Other      Arthritis Other      Other (hay fever) Other          No Known Allergies     Objective     Visit Vitals  LMP  (LMP Unknown)   OB Status Postmenopausal   Smoking Status Never        Physical Exam    PE:  General: Obese female in no acute distress.  The patient demonstrates no pain behavior, symptom magnification or overt drug-seeking behavior.  Eye: Pupils appropriate for room lighting  Neck/thyroid: No obvious goiter or enlargement of neck noted  Respiratory exam: Normal respiratory effort, unlabored respiration. No accessory muscle use noted  Cardiac exam: Bilateral radial pulses intact,  extensive edema bilateral lower extremities  Abdominal: Nondistended  Spine, lumbar: The patient is able to rise from a seated to standing position with hesitancy secondary to weight.  Gait is waddling.    Ortho: Knee: Pain with active/passive range of motion bilateral knees.  Tenderness over medial joint line bilateral knees.  Positive crepitus.  Neurologic exam: Muscle strength is antigravity in all 4 extremities.  Psychiatric exam: Judgment and insight normal, affect normal, speech is fluent, affect appropriate, demonstrating no signs of hypersomnolence, sedation, or confusion          Assessment/Plan   Problem List Items Addressed This Visit             ICD-10-CM    Primary osteoarthritis of both knees - Primary M17.0       68-year-old female with history of morbid obesity presents for follow-up of bilateral knee pain secondary to osteoarthritis.  Knee x-ray consistent with fairly significant degenerative changes especially in her medial compartment.  Given patient's body habitus she was sent to pain management for an image guided knee injection.  She has not been able to tolerate physical therapy due to the knee pain.  The patient states that she received moderate relief which was temporary.  She states relief lasted several weeks.  Symptoms have returned and patient feels the knee pain is debilitating and interfering with normal daily function.  We discussed proceeding with a diagnostic genicular nerve block with progression to genicular RFA if successful.  Reviewed potential risks and benefits and the patient would like to proceed with a nerve block targeting bilateral knee pain.  We also discussed that she would benefit from restarting physical therapy in the form of aqua therapy.  She states that she tolerated aqua therapy in the past and felt it was helpful.  Provided a referral to resume aqua therapy.  Patient will continue medication as prescribed by PM&R.  She may benefit from the addition of duloxetine  in the future.  Plan reviewed with patient at today's visit.    -Patient scheduled for diagnostic genicular nerve block targeting osteoarthritis bilateral knees.  CPT 51643.  Reviewed risks and benefits with patient and she would like to proceed.  If successful patient may proceed with genicular RFA.  -Provided a new referral to restart aqua therapy targeting bilateral knee pain/joint pain.  Therapy to focus on strengthening lower extremities.  -Patient will continue medication as prescribed by PM&R.  She may benefit from the addition of duloxetine in the future.  -Patient will follow-up based on results of genicular nerve block.  If nerve block is successful she may proceed with RFA and follow-up in our office after that.    Disclaimer: This note was transcribed using an audio transcription device.  As such, minor errors may be present with regard to spelling, punctuation, and inadvertent word insertion.  Please disregard such errors.       I will defer medication management to Dr. Alba, she should continue with current regimen, would consider addition of duloxetine in the future as well if she has not been on that.             Relevant Orders    Referral to Physical Therapy    Nerve Block     Other Visit Diagnoses         Codes    Chronic pain of both knees     M25.561, M25.562, G89.29    Relevant Orders    Referral to Physical Therapy    Nerve Block

## 2024-04-02 NOTE — ASSESSMENT & PLAN NOTE
68-year-old female with history of morbid obesity presents for follow-up of bilateral knee pain secondary to osteoarthritis.  Knee x-ray consistent with fairly significant degenerative changes especially in her medial compartment.  Given patient's body habitus she was sent to pain management for an image guided knee injection.  She has not been able to tolerate physical therapy due to the knee pain.  The patient states that she received moderate relief which was temporary.  She states relief lasted several weeks.  Symptoms have returned and patient feels the knee pain is debilitating and interfering with normal daily function.  We discussed proceeding with a diagnostic genicular nerve block with progression to genicular RFA if successful.  Reviewed potential risks and benefits and the patient would like to proceed with a nerve block targeting bilateral knee pain.  We also discussed that she would benefit from restarting physical therapy in the form of aqua therapy.  She states that she tolerated aqua therapy in the past and felt it was helpful.  Provided a referral to resume aqua therapy.  Patient will continue medication as prescribed by PM&R.  She may benefit from the addition of duloxetine in the future.  Plan reviewed with patient at today's visit.    -Patient scheduled for diagnostic genicular nerve block targeting osteoarthritis bilateral knees.  CPT 58274.  Reviewed risks and benefits with patient and she would like to proceed.  If successful patient may proceed with genicular RFA.  -Provided a new referral to restart aqua therapy targeting bilateral knee pain/joint pain.  Therapy to focus on strengthening lower extremities.  -Patient will continue medication as prescribed by PM&R.  She may benefit from the addition of duloxetine in the future.  -Patient will follow-up based on results of genicular nerve block.  If nerve block is successful she may proceed with RFA and follow-up in our office after  that.    Disclaimer: This note was transcribed using an audio transcription device.  As such, minor errors may be present with regard to spelling, punctuation, and inadvertent word insertion.  Please disregard such errors.       I will defer medication management to Dr. Alba, she should continue with current regimen, would consider addition of duloxetine in the future as well if she has not been on that.

## 2024-04-09 DIAGNOSIS — M54.50 LOW BACK PAIN, UNSPECIFIED: ICD-10-CM

## 2024-04-09 DIAGNOSIS — G89.29 OTHER CHRONIC PAIN: ICD-10-CM

## 2024-04-09 RX ORDER — GABAPENTIN 300 MG/1
300 CAPSULE ORAL 2 TIMES DAILY
Qty: 60 CAPSULE | Refills: 3 | Status: SHIPPED | OUTPATIENT
Start: 2024-04-09

## 2024-04-10 ENCOUNTER — OFFICE VISIT (OUTPATIENT)
Dept: DERMATOLOGY | Facility: CLINIC | Age: 69
End: 2024-04-10
Payer: MEDICARE

## 2024-04-10 DIAGNOSIS — L57.8 OTHER SKIN CHANGES DUE TO CHRONIC EXPOSURE TO NONIONIZING RADIATION: Primary | ICD-10-CM

## 2024-04-10 DIAGNOSIS — L71.9 ROSACEA: ICD-10-CM

## 2024-04-10 DIAGNOSIS — L82.1 SEBORRHEIC KERATOSIS: ICD-10-CM

## 2024-04-10 DIAGNOSIS — L81.4 LENTIGO: ICD-10-CM

## 2024-04-10 DIAGNOSIS — D22.9 MELANOCYTIC NEVUS, UNSPECIFIED LOCATION: ICD-10-CM

## 2024-04-10 DIAGNOSIS — D18.01 HEMANGIOMA OF SKIN: ICD-10-CM

## 2024-04-10 PROCEDURE — 99213 OFFICE O/P EST LOW 20 MIN: CPT | Performed by: STUDENT IN AN ORGANIZED HEALTH CARE EDUCATION/TRAINING PROGRAM

## 2024-04-10 PROCEDURE — 1160F RVW MEDS BY RX/DR IN RCRD: CPT | Performed by: STUDENT IN AN ORGANIZED HEALTH CARE EDUCATION/TRAINING PROGRAM

## 2024-04-10 PROCEDURE — 1159F MED LIST DOCD IN RCRD: CPT | Performed by: STUDENT IN AN ORGANIZED HEALTH CARE EDUCATION/TRAINING PROGRAM

## 2024-04-10 NOTE — PROGRESS NOTES
Subjective     Ariana Wills is a 68 y.o. female who presents for the following: Skin Check (LV 8/31/23.  Patient is concerned with lower leg dark areas.).     Review of Systems:  No other skin or systemic complaints other than what is documented elsewhere in the note.    The following portions of the chart were reviewed this encounter and updated as appropriate:         Skin Cancer History  No skin cancer on file.      Specialty Problems    None       Objective   Well appearing patient in no apparent distress; mood and affect are within normal limits.    A full examination was performed including scalp, head, eyes, ears, nose, lips, neck, chest, axillae, abdomen, back, buttocks, bilateral upper extremities, bilateral lower extremities, hands, feet, fingers, toes, fingernails, and toenails. All findings within normal limits unless otherwise noted below.    Assessment/Plan   1. Other skin changes due to chronic exposure to nonionizing radiation  Mottled pigmentation, telangiectasias and brown reticular macules in sun exposed areas on the face, extremities, trunk    The risk of chronic, cumulative sun damage and risk of development of skin cancer was reviewed with the patient today. Discussed important of sun protection with sun protective clothing and/or broad spectrum sunscreen spf 30 or above.  Warning signs of skin cancer were reviewed. Patient to contact office should they notice any new or changing pre-existing skin lesion.    Related Procedures  Follow Up In Dermatology - Established Patient    2. Melanocytic nevus, unspecified location  Benign to slightly atypical appearing brown pigmented macules and papules    Clinically benign appearing nevi, reassurance provided to the patient today. Discussed important of sun protection with sun protective clothing and/or broad spectrum sunscreen spf 30 or above.  ABCDEs of melanoma reviewed. Patient to f/u should they notice any new or changing pre-existing skin  lesion.    3. Seborrheic keratosis  Stuck on verrucous, tan-brown papules and plaques.      The benign nature of these skin lesions were reviewed with the patient today and reassurance was provided. Patient advised to return for f/u if the lesions change in size, shape, color, become painful, tender, itch or bleed.    4. Hemangioma of skin  Bright red papules    Discussed benign nature of condition, reassured. Reviewed warning signs of skin cancer with patient.    5. Lentigo  Scattered tan macules in sun-exposed areas.    Benign nature of these skin lesions reviewed and relation to sun exposure discussed. Reassurance provided. Reviewed warning signs of skin cancer.    6. Rosacea  Head - Anterior (Face)  Improved pustules, still with central face erythema    Continue metronidazole cream as prescribed  Continue sun protection  Can consider PDL in future

## 2024-04-11 ENCOUNTER — TREATMENT (OUTPATIENT)
Dept: OCCUPATIONAL THERAPY | Facility: CLINIC | Age: 69
End: 2024-04-11
Payer: MEDICARE

## 2024-04-11 DIAGNOSIS — R29.898 WEAKNESS OF BOTH LOWER EXTREMITIES: ICD-10-CM

## 2024-04-11 DIAGNOSIS — M79.604 PAIN IN BOTH LOWER EXTREMITIES: ICD-10-CM

## 2024-04-11 DIAGNOSIS — M79.605 PAIN IN BOTH LOWER EXTREMITIES: ICD-10-CM

## 2024-04-11 DIAGNOSIS — I89.0 LYMPHEDEMA OF BOTH LOWER EXTREMITIES: ICD-10-CM

## 2024-04-11 PROCEDURE — 97535 SELF CARE MNGMENT TRAINING: CPT | Mod: GO

## 2024-04-11 PROCEDURE — 97110 THERAPEUTIC EXERCISES: CPT | Mod: GO

## 2024-04-11 PROCEDURE — 97140 MANUAL THERAPY 1/> REGIONS: CPT | Mod: GO

## 2024-04-11 ASSESSMENT — PAIN DESCRIPTION - DESCRIPTORS: DESCRIPTORS: ACHING;DISCOMFORT

## 2024-04-11 ASSESSMENT — PAIN SCALES - GENERAL: PAINLEVEL_OUTOF10: 5 - MODERATE PAIN

## 2024-04-11 ASSESSMENT — PAIN - FUNCTIONAL ASSESSMENT: PAIN_FUNCTIONAL_ASSESSMENT: 0-10

## 2024-04-11 ASSESSMENT — ACTIVITIES OF DAILY LIVING (ADL): HOME_MANAGEMENT_TIME_ENTRY: 22

## 2024-04-11 NOTE — PROGRESS NOTES
Occupational Therapy    Occupational Therapy Treatment    Name: Ariana Wills  MRN: [id number]  : 1955  Date: 24  Time Calculation  Start Time: 1300  Stop Time: 1358  Time Calculation (min): 58 min    Assessment: garment fitting and education        Plan: Continue with POC as tolerated, monitor home program, assess new garments,           Subjective   General:  OT Last Visit  OT Received On: 02/15/24     Precautions:  Precautions Comment: NA  Pain Assessment:  Pain Assessment  Pain Assessment: 0-10  Pain Score: 5 - Moderate pain  Pain Type: Chronic pain  Pain Location: Knee  Pain Orientation: Right, Left  Pain Descriptors: Aching, Discomfort  Pain Frequency: Intermittent    Objective    C   Therapy/Activity: Therapeutic Exercise  Therapeutic Exercise Performed: Yes  Therapeutic Exercise Activity 1: green theraband hip flexion 10 reps 2 sets  Therapeutic Exercise Activity 2: green theraband thigh fallout 2 setos 10 reps each    Therapeutic Activity  Therapeutic Activity Performed: Yes  Therapeutic Activity 1: Pt arrived with BL velcro wrap with compression undersock with edu for donning/doffing and tension for straps with tension guide. pt able to verbalize steps with good accuracy    Manual Therapy  Manual Therapy Performed: Yes  Manual Therapy Activity 1: girth measurements with volume increased on BLEs  Lymphedema Assessment    Lymphedema Assessments:  Lymphedema Assessments: Lower extremities  Right Lower Extremity:  R Metatarsal (cm): 22 cm  R Heel Y Angle: 35 cm  R Ankle (cm): 29 cm  R 10 cm Above Ankle (cm): 44 cm  R 20 cm Above Ankle (cm): 56.5 cm  R 30 cm Above Ankle (cm): 60.5 cm  R 40 cm Above Ankle (cm): 0 cm  R Knee (cm): 60 cm  R 10 cm Above Knee (cm): 78 cm  R 20 cm Above Knee (cm): 83 cm  R 30 cm Above Knee (cm): 0 cm  R 40 cm Above Knee (cm): 0 cm  R 50 cm Above Knee (cm): 0 cm  Right lower extremity total: 468  Left Lower Extremity:  L Metatarsal (cm): 22.7 cm  L Heel Y Angle: 36  cm  L Ankle (cm): 31 cm  L 10 cm Above Ankle (cm): 45 cm  L 20 cm Above Ankle (cm): 58 cm  L 30 cm Above Ankle (cm): 60.5 cm  L 40 cm Above Ankle (cm): 0 cm  L Knee (cm): 60 cm  L 10 cm Above Knee (cm): 79 cm  L 20 cm Above Knee (cm): 81.5 cm  L 30 cm Above Knee (cm): 0 cm  L 40 cm Above Knee (cm): 0 cm  L 50 cm Above Knee (cm): 0 cm  Left Lower extremity total: 473.7     Pain Assessment:  Pain Assessment: 0-10  Pain Score: 5 - Moderate pain  Pain Type: Chronic pain  Pain Location: Knee  Pain Orientation: Right, Left  Pain Descriptors: Aching, Discomfort  Pain Frequency: Intermittent  Therapy Precautions:  Precautions Comment: NA      OP EDUCATION:       Goals:  Active       OT/lymphedema goals        Pt will be I with stating and demonstrating home exercise program in order to improve patients ability to perform self-care, household, work and/or leisure tasks.  (Progressing)       Start:  10/10/23    Expected End:  06/28/24            Pt will be able to perform self care, household, work and or leisure tasks with   0-2/10 pain rating, 100 % of the time  (Progressing)       Start:  10/10/23    Expected End:  06/28/24            Pt will demo 5% cm  of volume reduction with BLE in order for proper fitting of medical compression garments and increase ease in transfers and IADLs  (Progressing)       Start:  10/10/23    Expected End:  06/28/24            Pt will I perform skin care for infection prevention and integrity of skin  (Met)       Start:  10/10/23    Expected End:  03/29/24    Resolved:  04/11/24         I with donning/doffing medical compression garments with AE as needed  (Progressing)       Start:  10/10/23    Expected End:  06/28/24

## 2024-04-26 ENCOUNTER — HOSPITAL ENCOUNTER (OUTPATIENT)
Dept: RADIOLOGY | Facility: HOSPITAL | Age: 69
Discharge: HOME | End: 2024-04-26
Payer: MEDICARE

## 2024-04-26 ENCOUNTER — HOSPITAL ENCOUNTER (OUTPATIENT)
Dept: GASTROENTEROLOGY | Facility: HOSPITAL | Age: 69
Discharge: HOME | End: 2024-04-26
Payer: MEDICARE

## 2024-04-26 VITALS
OXYGEN SATURATION: 99 % | RESPIRATION RATE: 20 BRPM | HEART RATE: 51 BPM | DIASTOLIC BLOOD PRESSURE: 57 MMHG | TEMPERATURE: 97.2 F | SYSTOLIC BLOOD PRESSURE: 146 MMHG

## 2024-04-26 DIAGNOSIS — G89.29 CHRONIC PAIN OF BOTH KNEES: ICD-10-CM

## 2024-04-26 DIAGNOSIS — M17.0 PRIMARY OSTEOARTHRITIS OF BOTH KNEES: ICD-10-CM

## 2024-04-26 DIAGNOSIS — M25.562 CHRONIC PAIN OF BOTH KNEES: ICD-10-CM

## 2024-04-26 DIAGNOSIS — M25.561 CHRONIC PAIN OF BOTH KNEES: ICD-10-CM

## 2024-04-26 DIAGNOSIS — R52 PAIN: ICD-10-CM

## 2024-04-26 PROCEDURE — 2500000005 HC RX 250 GENERAL PHARMACY W/O HCPCS: Performed by: PHYSICAL MEDICINE & REHABILITATION

## 2024-04-26 PROCEDURE — 2500000004 HC RX 250 GENERAL PHARMACY W/ HCPCS (ALT 636 FOR OP/ED): Performed by: PHYSICAL MEDICINE & REHABILITATION

## 2024-04-26 PROCEDURE — 64454 NJX AA&/STRD GNCLR NRV BRNCH: CPT | Mod: 50 | Performed by: PHYSICAL MEDICINE & REHABILITATION

## 2024-04-26 PROCEDURE — 64454 NJX AA&/STRD GNCLR NRV BRNCH: CPT | Performed by: PHYSICAL MEDICINE & REHABILITATION

## 2024-04-26 RX ORDER — BUPIVACAINE HYDROCHLORIDE 5 MG/ML
INJECTION, SOLUTION EPIDURAL; INTRACAUDAL AS NEEDED
Status: COMPLETED | OUTPATIENT
Start: 2024-04-26 | End: 2024-04-26

## 2024-04-26 RX ORDER — LIDOCAINE HYDROCHLORIDE 5 MG/ML
INJECTION, SOLUTION INFILTRATION; INTRAVENOUS AS NEEDED
Status: COMPLETED | OUTPATIENT
Start: 2024-04-26 | End: 2024-04-26

## 2024-04-26 RX ADMIN — LIDOCAINE HYDROCHLORIDE 15 ML: 5 INJECTION, SOLUTION INFILTRATION at 11:03

## 2024-04-26 RX ADMIN — BUPIVACAINE HYDROCHLORIDE 15 ML: 5 INJECTION, SOLUTION EPIDURAL; INTRACAUDAL; PERINEURAL at 11:15

## 2024-04-26 ASSESSMENT — PAIN DESCRIPTION - DESCRIPTORS: DESCRIPTORS: ACHING

## 2024-04-26 ASSESSMENT — PAIN SCALES - GENERAL
PAINLEVEL_OUTOF10: 4
PAINLEVEL_OUTOF10: 7

## 2024-04-26 ASSESSMENT — PAIN - FUNCTIONAL ASSESSMENT
PAIN_FUNCTIONAL_ASSESSMENT: 0-10
PAIN_FUNCTIONAL_ASSESSMENT: 0-10

## 2024-04-26 ASSESSMENT — COLUMBIA-SUICIDE SEVERITY RATING SCALE - C-SSRS
2. HAVE YOU ACTUALLY HAD ANY THOUGHTS OF KILLING YOURSELF?: NO
6. HAVE YOU EVER DONE ANYTHING, STARTED TO DO ANYTHING, OR PREPARED TO DO ANYTHING TO END YOUR LIFE?: NO
1. IN THE PAST MONTH, HAVE YOU WISHED YOU WERE DEAD OR WISHED YOU COULD GO TO SLEEP AND NOT WAKE UP?: NO

## 2024-04-26 NOTE — H&P
History Of Present Illness  Ariana Wills is a 68 y.o. female presents for procedure state below. Endorses no changes in past medical history or medical health since last seen in clinic.     Past Medical History  She has a past medical history of Abnormal weight gain (05/04/2016), Candidiasis, unspecified (05/22/2015), Cellulitis, unspecified (09/07/2014), Encounter for screening for malignant neoplasm of cervix (05/22/2015), Encounter for screening mammogram for malignant neoplasm of breast (05/22/2015), Encounter for screening mammogram for malignant neoplasm of breast (05/22/2015), Furuncle of buttock (09/18/2019), GERD (gastroesophageal reflux disease), Hereditary and idiopathic neuropathy, unspecified (09/29/2013), Hypertension, Hypothyroid, Lymphedema, not elsewhere classified (05/22/2015), Other symptoms and signs involving the musculoskeletal system (05/03/2016), Pain in left hip (05/03/2016), Personal history of other diseases of the nervous system and sense organs, Personal history of other diseases of the respiratory system (04/10/2015), Personal history of other diseases of the respiratory system (06/22/2015), Personal history of other specified conditions (06/28/2016), Personal history of other specified conditions (05/22/2015), Sleep apnea, Unspecified asthma with (acute) exacerbation ([facility]) (05/03/2016), and Weakness of trunk musculature (11/01/2023).    Surgical History  She has a past surgical history that includes Cholecystectomy (09/27/2013); Carpal tunnel release (09/27/2013); Colonoscopy; Dilation and curettage of uterus; and Cardiac catheterization (04/03/2023).     Social History  She reports that she has never smoked. She has never used smokeless tobacco. She reports current alcohol use. She reports that she does not use drugs.    Family History  Family History   Problem Relation Name Age of Onset    Colon cancer Mother      Other (cardiac disorder) Mother      Heart failure Father       Other (INTERNAL BLEEDING) Brother      Asthma Other      Heart disease Other      Hypertension Other      Sleep disorder Other      Arthritis Other      Other (hay fever) Other          Allergies  Patient has no known allergies.    Review of Symptoms:   Constitutional: Negative for chills, diaphoresis or fever  HENT: Negative for neck swelling  Eyes:.  Negative for eye pain  Respiratory:.  Negative for cough, shortness of breath or wheezing    Cardiovascular:.  Negative for chest pain or palpitations  Gastrointestinal:.  Negative for abdominal pain, nausea and vomiting  Genitourinary:.  Negative for urgency  Musculoskeletal:  Positive for joint pain. Denies falls within the past 3 months.  Skin: Negative for wounds or itching   Neurological: Negative for dizziness, seizures, loss of consciousness and weakness  Endo/Heme/Allergies: Does not bruise/bleed easily  Psychiatric/Behavioral: Negative for depression. The patient does not appear anxious.       PHYSICAL EXAM  Vitals signs reviewed  Constitutional:       General: Not in acute distress     Appearance: Normal appearance. Not ill-appearing.  HENT:     Head: Normocephalic and atraumatic  Eyes:     Conjunctiva/sclera: Conjunctivae normal  Cardiovascular:     Rate and Rhythm: Normal rate and regular rhythm  Pulmonary:     Effort: No respiratory distress  Abdominal:     Palpations: Abdomen is soft  Musculoskeletal: RUBIN  Skin:     General: Skin is warm and dry  Neurological:     General: No focal deficit present  Psychiatric:         Mood and Affect: Mood normal         Behavior: Behavior normal     Last Recorded Vitals  BP (!) 141/48   Pulse 57   Temp 36.2 °C (97.2 °F) (Temporal)   Resp 18   SpO2 98%     Relevant Results  Current Outpatient Medications   Medication Instructions    acetaminophen (Tylenol) 500 mg tablet oral, Every 6 hours PRN    albuterol 90 mcg/actuation inhaler inhalation    atenolol (Tenormin) 100 mg tablet TAKE 1 TABLET DAILY    azelastine  (Astelin) 137 mcg (0.1 %) nasal spray 2 sprays, Each Nostril, 2 times daily    cholecalciferol (Vitamin D-3) 5,000 Units tablet 1 tablet, oral, Daily    famotidine (PEPCID) 20 mg, oral, Nightly    flunisolide (Nasalide) 25 mcg (0.025 %) spray,non-aerosol nasal    gabapentin (NEURONTIN) 300 mg, oral, 2 times daily    glucosamine HCl-msm-chondroitn 750-375-400 mg tablet oral    hydroCHLOROthiazide (HYDRODiuril) 25 mg tablet TAKE 1 TABLET DAILY    levothyroxine (Synthroid, Levoxyl) 50 mcg tablet TAKE 1 TABLET DAILY    lisinopril 40 mg tablet TAKE 1 TABLET DAILY    multivitamin capsule oral    rosuvastatin (CRESTOR) 10 mg, oral, Nightly       No results found for this or any previous visit from the past 1000 days.     No image results found.       1. Primary osteoarthritis of both knees  Nerve Block    Nerve Block      2. Chronic pain of both knees  Nerve Block    Nerve Block           ASSESSMENT/PLAN  Ariana Wills is a 68 y.o. female presents for a bilateral genicular nerve block    Our plan is as follows:  - Follow In pain clinic  - Continue to participate in physical therapy as well as physician directed home exercises  - Continue pain medications as prescribed       Joe Contreras MD

## 2024-04-26 NOTE — DISCHARGE INSTRUCTIONS
You had a pain management procedure today.    Observe/ monitor for the following signs & symptoms:  If you notice Excessive bleeding (slow general oozing that completely soaks the dressing, or fresh bright red bleeding).   In either case, apply pressure to the area, elevate it if possible & call your doctor at once.    Also observe for:  Change in color  Numbness/tingling  Coldness to the touch  Swelling  Drainage  Temperature of 101.5 or higher.  Increased, uncontrollable pain.    *If you notice the above signs & symptoms, please call your doctor right away!*      Discharge Instructions:    Your pain may not be gone immediately after this procedure; it generally takes 3 to 5 days for the steroid to work.   Keep the needle site clean & dry for 24 hours.  Continue your present medications.  Make an appointment to see your doctor in 2-3 weeks.  If any problems occur, or if you have any further questions, please call as soon as possible. If you find that you cannot reach your doctor, but feel that the condition nees a doctor's attention, go to the closest emergency department & take this discharge paper with you.       Dr. Estrella's Office: [phone]

## 2024-04-26 NOTE — PROCEDURES
Nerve Block    Date/Time: 4/26/2024 11:15 AM    Performed by: Scott Estrella MD  Authorized by: Alicia Buchanan, APRCIERRA-CNP, APRCIERRA-CNS    Consent:     Consent obtained:  Written    Consent given by:  Patient    Risks, benefits, and alternatives were discussed: yes      Risks discussed:  Nerve damage, infection, allergic reaction, swelling, bleeding and pain    Alternatives discussed:  No treatment, delayed treatment and alternative treatment  Universal protocol:     Procedure explained and questions answered to patient or proxy's satisfaction: yes      Relevant documents present and verified: yes      Test results available: yes      Imaging studies available: yes      Required blood products, implants, devices, and special equipment available: yes      Site/side marked: yes      Immediately prior to procedure, a time out was called: yes      Patient identity confirmed:  Verbally with patient, hospital-assigned identification number and arm band  Comments:      Diagnostic genicular nerve block    The patient was positioned comfortably in the suppine position and was prepped and draped in the usual sterile manner.  Sterile precautions, including sterile gloves, disposable cap and masks were worn for the procedure at all times. Skeletal landmarks were identified under fluoroscopy. There was no evidence of infection at the site(s) of needle insertion. A final time-out was performed.  At each insertion site, the skin and subcutaneous tissue was anesthetized with 1% lidocaine with bicarbonate.  The needles were inserted and advanced under fluoroscopic guidance in multiple planes, and placed at the junction of the lateral and medial femoral condyles and the diaphysis of the femur and the junction of the medial tibial diaphysis and tibial flare. Following negative aspiration for blood, medication was injected the Superior Lateral, Superior Medial and Inferior Medial Genicular Nerves.  The needles were flushed and/or  restyletted and removed.  Pressure was held, and after ensuring hemostasis and the absence of hematoma, an adhesive bandage was applied to the site of needle insertion.      Laterality: Bilateral  Medications at each site: [0.5ml 0.5% bupivacaine]

## 2024-04-29 ENCOUNTER — TELEPHONE (OUTPATIENT)
Dept: PAIN MEDICINE | Facility: CLINIC | Age: 69
End: 2024-04-29
Payer: MEDICARE

## 2024-04-29 DIAGNOSIS — M17.0 PRIMARY OSTEOARTHRITIS OF BOTH KNEES: Primary | ICD-10-CM

## 2024-04-29 NOTE — TELEPHONE ENCOUNTER
Spoke with the patient on the phone. She did not get any pain relief form her Bilateral diagnostic nerve blocks on 4/26/24. Informed her that based on these results and after discussion with Dr. Estrella he placed a referral for orthopedic consultation with Dr. Taylor. Advised her to schedule appointment with Dr. Taylor.

## 2024-04-29 NOTE — PROGRESS NOTES
Patient did not get any relief from the bilateral diagnostic genicular nerve block.  She has failed intra-articular steroid injection as well.  Patient does have significant osteoarthritis of both knees.  I will refer her over to one of our orthopedic surgeons Dr. Taylor for his evaluation.

## 2024-05-15 ENCOUNTER — DOCUMENTATION (OUTPATIENT)
Dept: OCCUPATIONAL THERAPY | Facility: CLINIC | Age: 69
End: 2024-05-15
Payer: MEDICARE

## 2024-05-15 PROBLEM — L71.9 ROSACEA: Status: ACTIVE | Noted: 2023-08-31

## 2024-05-15 PROBLEM — M79.606 PAIN OF LOWER EXTREMITY: Status: ACTIVE | Noted: 2024-05-15

## 2024-05-15 PROBLEM — D12.5 BENIGN NEOPLASM OF SIGMOID COLON: Status: ACTIVE | Noted: 2023-08-23

## 2024-05-15 PROBLEM — K57.30 DIVERTICULOSIS OF LARGE INTESTINE: Status: ACTIVE | Noted: 2023-08-23

## 2024-05-15 PROBLEM — K64.9 HEMORRHOIDS: Status: ACTIVE | Noted: 2023-08-23

## 2024-05-15 PROBLEM — R20.2 NUMBNESS AND TINGLING SENSATION OF SKIN: Status: ACTIVE | Noted: 2024-02-15

## 2024-05-15 PROBLEM — Z86.0100 HISTORY OF COLONIC POLYPS: Status: ACTIVE | Noted: 2023-08-23

## 2024-05-15 PROBLEM — R20.0 NUMBNESS AND TINGLING SENSATION OF SKIN: Status: ACTIVE | Noted: 2024-02-15

## 2024-05-15 PROBLEM — Z86.010 HISTORY OF COLONIC POLYPS: Status: ACTIVE | Noted: 2023-08-23

## 2024-05-15 NOTE — PROGRESS NOTES
Occupational Therapy                 Therapy Communication Note    Patient Name: Ariana Wills  MRN: [id number]  Today's Date: 5/15/2024     Discipline: Occupational Therapy      Comment:  Phone call placed to discuss if pt received new garments for day time.  Pt reporting no garments for daytime received.  Will communicate with [facility] to discuss progress.  Pt will be placed on hold

## 2024-05-16 ENCOUNTER — OFFICE VISIT (OUTPATIENT)
Dept: PHYSICAL MEDICINE AND REHAB | Facility: CLINIC | Age: 69
End: 2024-05-16
Payer: MEDICARE

## 2024-05-16 VITALS — RESPIRATION RATE: 24 BRPM | DIASTOLIC BLOOD PRESSURE: 86 MMHG | SYSTOLIC BLOOD PRESSURE: 142 MMHG | HEART RATE: 88 BPM

## 2024-05-16 DIAGNOSIS — R20.0 NUMBNESS AND TINGLING SENSATION OF SKIN: ICD-10-CM

## 2024-05-16 DIAGNOSIS — M17.0 PRIMARY OSTEOARTHRITIS OF BOTH KNEES: Primary | ICD-10-CM

## 2024-05-16 DIAGNOSIS — M48.061 SPINAL STENOSIS OF LUMBAR REGION WITHOUT NEUROGENIC CLAUDICATION: ICD-10-CM

## 2024-05-16 DIAGNOSIS — R20.2 NUMBNESS AND TINGLING SENSATION OF SKIN: ICD-10-CM

## 2024-05-16 DIAGNOSIS — M47.816 LUMBAR SPONDYLOSIS: ICD-10-CM

## 2024-05-16 PROCEDURE — 3077F SYST BP >= 140 MM HG: CPT | Performed by: PHYSICAL MEDICINE & REHABILITATION

## 2024-05-16 PROCEDURE — 1160F RVW MEDS BY RX/DR IN RCRD: CPT | Performed by: PHYSICAL MEDICINE & REHABILITATION

## 2024-05-16 PROCEDURE — 1036F TOBACCO NON-USER: CPT | Performed by: PHYSICAL MEDICINE & REHABILITATION

## 2024-05-16 PROCEDURE — 1159F MED LIST DOCD IN RCRD: CPT | Performed by: PHYSICAL MEDICINE & REHABILITATION

## 2024-05-16 PROCEDURE — 1125F AMNT PAIN NOTED PAIN PRSNT: CPT | Performed by: PHYSICAL MEDICINE & REHABILITATION

## 2024-05-16 PROCEDURE — 99214 OFFICE O/P EST MOD 30 MIN: CPT | Performed by: PHYSICAL MEDICINE & REHABILITATION

## 2024-05-16 PROCEDURE — 3079F DIAST BP 80-89 MM HG: CPT | Performed by: PHYSICAL MEDICINE & REHABILITATION

## 2024-05-16 ASSESSMENT — PAIN SCALES - GENERAL: PAINLEVEL: 5

## 2024-05-16 NOTE — PROGRESS NOTES
ref by Dr. Garza for back and leg pain and weakness.      History of Present IllnessPhysical Medicine and Rehabilitation [facility]     Chief Complaint:  Low back pain     HPI:  Ms. Wills is a 69 yo F w pmh of obesity, lymphedema, lipedema, hypothyroidism, gerd presenting with back and hip pain.     Recall  Onset: progressively worse in last 15 years  Location: across low back   Radiation: bilateral, worse on R, R lateral leg numbness ad in the calf  Quality: sharp  Duration: comes and goes  Aggravating factors: standing and walking, standing is worse  walking causes cramping in the legs and across the back  Uses a cane   Alleviating factors: laying down  Severity: [5/10] today, [10/10] most severe  Numbness/Tingling: yes idiopathic neuropathy in feet and hands  Bowel or bladder incontinence: yes overactive, bowel depends on food  Pt's current medication regimen includes: advil prn helps but cant take it renal insufficiency  tylen w some relief but less than advil     Treatment to date:  Physical therapy: none  Medications taken to date for this complaint include the following:   as above  Prior injections: 15 years ago not sure   ? nerve blocks      She was last seen in June 2023. at that time we discussed:  Back pain; likely spinal stenosis and facet arthropathy  - xr L spine, xr hips  - PT for core rom hep Le strengthening  - discussing swimming for exercise  - gabapentin 300 mg bid titration explained, renal dosing  - no nsaids given renal insufficiency    Limpholipedema  - Lymphedema therapy; would need custom garments, pump  - tsh, bmp wnl except renal insufficiency    Had lymphedema eval and now pending fu appts.  pain in low back on the right, worse w standing in one spot needs to lean forward   is better.  Gabapentin is helping sleep at night.     Gabapentin 300 mg bid not sure re back,.        Received jaycee pants Sigvaris 10-15 mmhgh. Waiting for velcro wraps.   Waiting for pump.  States ankles are dowb but  the front of the shins., Feet are down. Thighs fluctuate.  Started some exercises, takes 24-48 hrs to recover, while she is doing them its ok.      She still goes to lymphedema therapy once a week. She has biker shorts. Velcro garments for below the knees and wears them during the day and then pending thigh velcro.   She has a pump at home does once a day. States pump hurts the knees.  Pain in both knees in doing back therapy.  L spine is much better. More strengthen walking.  Knee pain anterior L on the R general, pain w straightening. Takes tylenol and alternates ibuprofen.  Lidocaine patches takes the edge off.  Continues w gabapentin bid.      She was last seen in October 2023. At that time we discussed:  Back pain; likely spinal stenosis and facet arthropathy  - xr L spine reviewed personally shows L5/s1 degenerative disc disease,  xr hips w mild oa.  reviewed w patient and on personal review mild hip oa and extensive spondylosis  - water therapy for core rom hep Le strengthening; continue and pain improving  - discussing swimming for exercise  - gabapentin 300 mg bid  renal fx borderline, not sure if helping discussed to titrate down to one and off and see if makes a difference   - no nsaids given renal insufficiency    Lympholipedema  - Lymphedema therapycontinue  - able to do pump daily but not twice a day due to bulkiness  - has below knee velcro wraps, pending thigh wraps  - has received jaycee pants    - tsh, bmp wnl except renal insufficiency    Knee pain  - likely oa and obesity related  - xray knees  - will refer to pain management for fluoroscopy guided steroid joint injections      Just had knee injections; a little better some times pain still shoots. Overall some improvement.  Completed therapy water in end of Dec. Joined [email or web address],. She is walking more.   Legs are stronger. Water therapy for back and knees. Overall feels better. R hip intermittent pain.  When gets up at night when first bear  weight on it.   Has compression knees highs during the day, jobst 20-30 mmhg.  Doing pump once a day.  She was doing 45 mmhg. Might go to 50 mmhg. Sleeps in velcro wraps. Wears thigh pieces in the top.  Continues w gabapentin bid.      Numbness in the feet today. Was dx before w neuropathy in hands and feet. Idiopathic. Comes and goes.       She was last seen in feb 2024. At that time we discussed:     Back pain; likely spinal stenosis and facet arthropathy  - xr L spine reviewed personally shows L5/s1 degenerative disc disease,  xr hips w mild oa.  reviewed w patient and on personal review mild hip oa and extensive spondylosis  - water therapy for core rom hep Le strengthening; continue and pain improving  - discussing swimming for exercise  - gabapentin 300 mg bid  renal fx borderline, not sure if helping discussed to titrate down to one and off and see if makes a difference   - no nsaids given renal insufficiency    Lympholipedema  - Lymphedema therapycontinue  - able to do pump daily,going between 40-50 of pressure but tolerating well.  - has below knee velcro wraps, pending thigh wraps  - has received jaycee pants    - tsh, bmp wnl except renal insufficiency    Knee pain  - likely oa and obesity related  - xray knees reviewed w severe arthritis  - sp refer to pain management for us guided steroid joint injections; 6 days ago so far some relief     Numbness in feet, ho idiopathic neuropathy  - intermittent but more severe in last day  - will monitor  - could also be related to spine  - b12 and vitamin d    Blood work wnl. She is doing her garments and exercises., Knee pain severe and uses a cane, doing pump.  Tens until helps. Has not found outside water therapy yet,      Injections:    Bl us guided steroid injection by pain management 2/2024 : some relief so far  2. genicular nerve blocks bl April 2024; no relief    Imaging  Reviewed 05/16/24 personally  Xr bl knee pain 12/2024  FINDINGS:  Advanced  osteoarthritis left knee worst medially.      Advanced tricompartmental osteoarthritis right knee also worst  medially.      No fracture or lesion bilaterally.      IMPRESSION:  Advanced osteoarthritis bilateral knees worst in the medial  compartments    Xr pelvis 6/2023  FINDINGS:  No fracture or dislocation is evident.Mild degenerative changes seen  of the hips. Mild degenerative changes seen of the SI joints and the  pubic symphysis. Vascular calcifications are seen over the soft  tissues.     IMPRESSION:  Mild degenerative change of the hips without osseous injury evident.  Xr l spine 6/2023    FINDINGS:  There are  5 lumbar type vertebral bodies. There is grade 1  anterolisthesis of L3 on L4 and L4 on L5. Vertebral body height and  alignment  are otherwise maintained.  No fracture or dislocation is  evident.  There is severe L3-4 through L5-S1 facet degenerative  change bilaterally. There is severe L5-S1 discogenic degenerative  change. Mild discogenic and facet degenerative changes seen at other  levels of the visualized spine. Mild degenerative changes seen of the  sacroiliac joints. Vascular calcifications and surgical changes are  seen over the soft tissues.     IMPRESSION:  1. Multilevel spondylolisthesis.  2. Degenerative change as above.     [PMH]  renal insufficiency  hypothyroidism  HTN  GERD  HLP  PVCs          [SOCHX]  retired  used to work at as a unit clerk  lives w   social alcohol  no smoking or drug use     Review of Systems:  Constitutional: Denies fever or chills, malaise, weight changes.   Eyes: Denies change in visual acuity   HENT: Denies nasal congestion or sore throat   Respiratory: Denies cough or shortness of breath   Cardiovascular: Denies chest pain or edema   GI: Denies abdominal pain, nausea, vomiting, bloody stools or diarrhea   : Denies dysuria   Integument: Denies rash   Neurologic: As per HPI  MSK: Per above HPI  Endocrine: Denies polyuria or polydipsia   Lymphatic:  Denies swollen glands   Psychiatric: Denies depression or anxiety           PHYSICAL EXAM:  /86 (BP Location: Right arm, Patient Position: Sitting)   Pulse 88   Resp 24   LMP  (LMP Unknown)     General: NAD, obese  Psychiatric: appropriate mood & affect.   Cardiovascular: Normal pedal pulses; no LE edema.  Respiratory: Normal rate; unlabored breathing.  Skin: disal erythema and sensitivity bl  Lymphatic: LE lympho and lipedema  NEURO: Alert and appropriate. Speech fluent, conversing appropriately.   Motor:   Rt: HF 5/5, KE 5/5, KF 5/5, DF 5/5, EHL 5/5, PF 5/5.   Lt: HF 5/5, KE 5/5, KF 5/5, DF 5/5, EHL 5/5, PF 5/5.  Sensation:    Light touch: intact in the b/l L3-S1 dermatomes.   PP: intact in the b/l L3-S1 dermatomes  Reflexes:    Right: patellar 2+, achilles 2+,    Left: patellar 2+, achilles 2+,    Babinski's downgoing b/l; no clonus  Gait:wide based slow antalgic  MSK:  Inspection reveals no evidence of a pelvic obliqity   Spinal range of motion: Flexion to 90° degrees, Extension of 30 degrees.  There is tenderness over the lumbar paraspinals       Ttp anterior and medial joint  Flexion 80, ext -10   Antalgic gait       Impression: Ms. Wills is a 67 yo F w pmh of obesity, lymphedema, lipedema, hypothyroidism, gerd presenting with back and hip pain. 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 Made great progress in lymphedema therapy below the knees, but around knees and thighs still significant swelling. Back pain improved w therapy, but now back to baseline. She is overall more active that she has been in years. Knees are also improving after steroid injections but only lasted one month, genicular nerve block wo relief.. she does have general leg weakness and continued back pain likely consistent w spinal stenosis.    Plan:     Back pain; likely spinal stenosis and facet arthropathy  - xr L spine reviewed personally shows L5/s1 degenerative disc disease,  xr hips w  mild oa.  reviewed w patient and on personal review mild hip oa and extensive spondylosis  - completed water therapy for core rom hep Le strengthening; initally had improvement but now worsening  - referral MRI L spine  - discussing swimming for exercise  - gabapentin 300 mg bid  renal fx borderline, not sure if helping discussed to titrate down to one and off and see if makes a difference   - no nsaids given renal insufficiency    Lympholipedema  stable  - Lymphedema therapycontinue  - able to do pump daily,going between 40-50 of pressure but tolerating well.  - has below knee velcro wraps,  thigh wraps  - has received jaycee pants    - tsh, bmp wnl except renal insufficiency    Knee pain  - end stage oa and obesity related  - xray knees reviewed w severe arthritis  - sp refer to pain management for us guided steroid joint injections; had one month of relief  - also tried genicular nerve blocks w no relief so did not move forward w RFA  - referral to ortho re eval for gel injections; she knows she would not be a surgical candidate due to obesity. Discussed gel less likely to help in severe oa  - if not able to do blind knee injection for gel; can consider referral to sports so can US guided.  - DME rollator for improved mobility     Numbness in feet, ho idiopathic neuropathy  - intermittent but more severe in last day  - will monitor  - could also be related to spine  - b12 and vitamin d wnl reviewed 05/16/24          fu 12 weeks     Louise Patel MD  Physical Medicine and Rehabilitation

## 2024-05-30 ENCOUNTER — OFFICE VISIT (OUTPATIENT)
Dept: PRIMARY CARE | Facility: CLINIC | Age: 69
End: 2024-05-30
Payer: MEDICARE

## 2024-05-30 ENCOUNTER — LAB (OUTPATIENT)
Dept: LAB | Facility: LAB | Age: 69
End: 2024-05-30
Payer: MEDICARE

## 2024-05-30 VITALS
BODY MASS INDEX: 50.02 KG/M2 | HEIGHT: 64 IN | HEART RATE: 55 BPM | TEMPERATURE: 97.8 F | WEIGHT: 293 LBS | OXYGEN SATURATION: 92 % | DIASTOLIC BLOOD PRESSURE: 43 MMHG | SYSTOLIC BLOOD PRESSURE: 145 MMHG

## 2024-05-30 DIAGNOSIS — N18.9 ANEMIA DUE TO CHRONIC KIDNEY DISEASE, UNSPECIFIED CKD STAGE: ICD-10-CM

## 2024-05-30 DIAGNOSIS — E66.01 MORBID OBESITY WITH BMI OF 50.0-59.9, ADULT (MULTI): ICD-10-CM

## 2024-05-30 DIAGNOSIS — E78.2 MIXED HYPERLIPIDEMIA: ICD-10-CM

## 2024-05-30 DIAGNOSIS — F33.2 SEVERE EPISODE OF RECURRENT MAJOR DEPRESSIVE DISORDER, WITHOUT PSYCHOTIC FEATURES (MULTI): ICD-10-CM

## 2024-05-30 DIAGNOSIS — E03.8 OTHER SPECIFIED HYPOTHYROIDISM: ICD-10-CM

## 2024-05-30 DIAGNOSIS — Z12.31 ENCOUNTER FOR SCREENING MAMMOGRAM FOR MALIGNANT NEOPLASM OF BREAST: ICD-10-CM

## 2024-05-30 DIAGNOSIS — D63.1 ANEMIA DUE TO CHRONIC KIDNEY DISEASE, UNSPECIFIED CKD STAGE: ICD-10-CM

## 2024-05-30 DIAGNOSIS — G62.9 NEUROPATHY: ICD-10-CM

## 2024-05-30 DIAGNOSIS — N18.31 STAGE 3A CHRONIC KIDNEY DISEASE (MULTI): ICD-10-CM

## 2024-05-30 DIAGNOSIS — Z00.00 MEDICARE ANNUAL WELLNESS VISIT, SUBSEQUENT: Primary | ICD-10-CM

## 2024-05-30 DIAGNOSIS — I10 ESSENTIAL HYPERTENSION: ICD-10-CM

## 2024-05-30 DIAGNOSIS — N39.3 STRESS INCONTINENCE: ICD-10-CM

## 2024-05-30 DIAGNOSIS — Z00.00 PHYSICAL EXAM: ICD-10-CM

## 2024-05-30 DIAGNOSIS — D12.6 COLON ADENOMA: ICD-10-CM

## 2024-05-30 LAB
ALBUMIN SERPL BCP-MCNC: 4.1 G/DL (ref 3.4–5)
ALP SERPL-CCNC: 120 U/L (ref 33–136)
ALT SERPL W P-5'-P-CCNC: 11 U/L (ref 7–45)
ANION GAP SERPL CALC-SCNC: 15 MMOL/L (ref 10–20)
AST SERPL W P-5'-P-CCNC: 19 U/L (ref 9–39)
BILIRUB SERPL-MCNC: 0.8 MG/DL (ref 0–1.2)
BUN SERPL-MCNC: 16 MG/DL (ref 6–23)
CALCIUM SERPL-MCNC: 9.6 MG/DL (ref 8.6–10.6)
CHLORIDE SERPL-SCNC: 102 MMOL/L (ref 98–107)
CHOLEST SERPL-MCNC: 123 MG/DL (ref 0–199)
CHOLESTEROL/HDL RATIO: 2.4
CO2 SERPL-SCNC: 29 MMOL/L (ref 21–32)
CREAT SERPL-MCNC: 1.07 MG/DL (ref 0.5–1.05)
EGFRCR SERPLBLD CKD-EPI 2021: 57 ML/MIN/1.73M*2
FERRITIN SERPL-MCNC: 159 NG/ML (ref 8–150)
GLUCOSE SERPL-MCNC: 99 MG/DL (ref 74–99)
HDLC SERPL-MCNC: 52.2 MG/DL
HGB BLD-MCNC: 11.9 G/DL (ref 12–16)
HGB RETIC QN: 32 PG (ref 28–38)
IMMATURE RETIC FRACTION: 12.5 %
IRON SATN MFR SERPL: 25 % (ref 25–45)
IRON SERPL-MCNC: 81 UG/DL (ref 35–150)
LDLC SERPL CALC-MCNC: 49 MG/DL
NON HDL CHOLESTEROL: 71 MG/DL (ref 0–149)
POTASSIUM SERPL-SCNC: 4.3 MMOL/L (ref 3.5–5.3)
PROT SERPL-MCNC: 7.1 G/DL (ref 6.4–8.2)
RETICS #: 0.08 X10*6/UL (ref 0.02–0.11)
RETICS/RBC NFR AUTO: 2.1 % (ref 0.5–2)
SODIUM SERPL-SCNC: 142 MMOL/L (ref 136–145)
TIBC SERPL-MCNC: 327 UG/DL (ref 240–445)
TRIGL SERPL-MCNC: 111 MG/DL (ref 0–149)
UIBC SERPL-MCNC: 246 UG/DL (ref 110–370)
VIT B12 SERPL-MCNC: 437 PG/ML (ref 211–911)
VLDL: 22 MG/DL (ref 0–40)

## 2024-05-30 PROCEDURE — 1158F ADVNC CARE PLAN TLK DOCD: CPT | Performed by: INTERNAL MEDICINE

## 2024-05-30 PROCEDURE — 1170F FXNL STATUS ASSESSED: CPT | Performed by: INTERNAL MEDICINE

## 2024-05-30 PROCEDURE — 85018 HEMOGLOBIN: CPT

## 2024-05-30 PROCEDURE — 3078F DIAST BP <80 MM HG: CPT | Performed by: INTERNAL MEDICINE

## 2024-05-30 PROCEDURE — 3077F SYST BP >= 140 MM HG: CPT | Performed by: INTERNAL MEDICINE

## 2024-05-30 PROCEDURE — 36415 COLL VENOUS BLD VENIPUNCTURE: CPT

## 2024-05-30 PROCEDURE — 3008F BODY MASS INDEX DOCD: CPT | Performed by: INTERNAL MEDICINE

## 2024-05-30 PROCEDURE — 1123F ACP DISCUSS/DSCN MKR DOCD: CPT | Performed by: INTERNAL MEDICINE

## 2024-05-30 PROCEDURE — 1160F RVW MEDS BY RX/DR IN RCRD: CPT | Performed by: INTERNAL MEDICINE

## 2024-05-30 PROCEDURE — 82607 VITAMIN B-12: CPT

## 2024-05-30 PROCEDURE — 99397 PER PM REEVAL EST PAT 65+ YR: CPT | Performed by: INTERNAL MEDICINE

## 2024-05-30 PROCEDURE — G0439 PPPS, SUBSEQ VISIT: HCPCS | Performed by: INTERNAL MEDICINE

## 2024-05-30 PROCEDURE — 80061 LIPID PANEL: CPT

## 2024-05-30 PROCEDURE — 82728 ASSAY OF FERRITIN: CPT

## 2024-05-30 PROCEDURE — 80053 COMPREHEN METABOLIC PANEL: CPT

## 2024-05-30 PROCEDURE — 83540 ASSAY OF IRON: CPT

## 2024-05-30 PROCEDURE — 1159F MED LIST DOCD IN RCRD: CPT | Performed by: INTERNAL MEDICINE

## 2024-05-30 PROCEDURE — 1036F TOBACCO NON-USER: CPT | Performed by: INTERNAL MEDICINE

## 2024-05-30 PROCEDURE — 83550 IRON BINDING TEST: CPT

## 2024-05-30 PROCEDURE — 85045 AUTOMATED RETICULOCYTE COUNT: CPT

## 2024-05-30 ASSESSMENT — PATIENT HEALTH QUESTIONNAIRE - PHQ9
9. THOUGHTS THAT YOU WOULD BE BETTER OFF DEAD, OR OF HURTING YOURSELF: NOT AT ALL
SUM OF ALL RESPONSES TO PHQ QUESTIONS 1-9: 10
6. FEELING BAD ABOUT YOURSELF - OR THAT YOU ARE A FAILURE OR HAVE LET YOURSELF OR YOUR FAMILY DOWN: SEVERAL DAYS
1. LITTLE INTEREST OR PLEASURE IN DOING THINGS: NOT AT ALL
8. MOVING OR SPEAKING SO SLOWLY THAT OTHER PEOPLE COULD HAVE NOTICED. OR THE OPPOSITE, BEING SO FIGETY OR RESTLESS THAT YOU HAVE BEEN MOVING AROUND A LOT MORE THAN USUAL: NOT AT ALL
10. IF YOU CHECKED OFF ANY PROBLEMS, HOW DIFFICULT HAVE THESE PROBLEMS MADE IT FOR YOU TO DO YOUR WORK, TAKE CARE OF THINGS AT HOME, OR GET ALONG WITH OTHER PEOPLE: SOMEWHAT DIFFICULT
4. FEELING TIRED OR HAVING LITTLE ENERGY: MORE THAN HALF THE DAYS
2. FEELING DOWN, DEPRESSED OR HOPELESS: NEARLY EVERY DAY
SUM OF ALL RESPONSES TO PHQ9 QUESTIONS 1 AND 2: 3
5. POOR APPETITE OR OVEREATING: SEVERAL DAYS
7. TROUBLE CONCENTRATING ON THINGS, SUCH AS READING THE NEWSPAPER OR WATCHING TELEVISION: NOT AT ALL
3. TROUBLE FALLING OR STAYING ASLEEP OR SLEEPING TOO MUCH: NEARLY EVERY DAY

## 2024-05-30 ASSESSMENT — ACTIVITIES OF DAILY LIVING (ADL)
MANAGING_FINANCES: INDEPENDENT
BATHING: INDEPENDENT
DRESSING: INDEPENDENT
DOING_HOUSEWORK: INDEPENDENT
TAKING_MEDICATION: INDEPENDENT
GROCERY_SHOPPING: INDEPENDENT

## 2024-05-30 NOTE — PROGRESS NOTES
"SUBJECTIVE:   Ariana Wills is a 68 y.o. female presenting for her annual physical/wellness.  PCP: Octavia Garza MD  Medicare wellness, physical, and Follow up.  Pt with chronic pain, from diffuse arthritis in back, knees, sees pain management doctor.  Taking Advil occasionally, knows not to over take due to borderline renal function. Tylenol does not work well. On gabapentin 300mg bid. Her pain management doctor said to go up to 3 per day. She will try that.  Does not take me for depression. She would like to see someone for therapy. Discussed collaborative care. Pt agrees to.  PHQ9 on file.  She has upper and lower extremities neuropathy. She wonders if she has MS and how to rule out. Has not been to neurologist for many years.   Stress incontinence. Has been doing Kegel, did not help.  Continues to go to lymphedema center for chronic leg lymphedema.  Anemia stable, likely due to CKD. Will check iron etc.    Colon screenin polyps, repeat in 5 y  Last pap: na  Last mammogram: one year ago  Dexa , normal   Vaccines Up to date  Diet:  healthy in general   She does see dermatologist for skin screening    No results found for: \"HGBA1C\"  Lab Results   Component Value Date    CREATININE 1.05 2023     Lab Results   Component Value Date    WBC 8.2 2023    HGB 11.6 (L) 2023    HCT 36.9 2023    MCV 94 2023     2023     Lab Results   Component Value Date    CHOL 116 2023    CHOL 149 2022    CHOL 165 2019     Lab Results   Component Value Date    HDL 45.3 2023    HDL 52.2 2022    HDL 50.7 2019     No results found for: \"LDLCALC\"  Lab Results   Component Value Date    TRIG 83 2023    TRIG 70 2022    TRIG 82 2019     No components found for: \"CHOLHDL\"       ROS:   otherwise Feeling well. No dyspnea or chest pain on exertion. No abdominal pain, change in bowel habits, black or bloody stools. No urinary tract or  " "symptoms otherwise.  No breast concerns.      OBJECTIVE:   The patient appears well, alert, oriented x 3, in no distress.   BP (!) 145/43   Pulse 55   Temp 36.6 °C (97.8 °F)   Ht 1.626 m (5' 4\")   Wt 142 kg (312 lb)   LMP  (LMP Unknown)   SpO2 92%   BMI 53.55 kg/m²   ENT normal.  Neck supple. No adenopathy or thyromegaly. ISAIAS. Lungs are clear, good air entry, no wheezes, rhonchi or rales. S1 and S2 normal, no murmurs, regular rate and rhythm. Abdomen is soft without tenderness, guarding, mass or organomegaly.  exam deferred to Gyn. Extremities show no edema, normal peripheral pulses. Neurological is normal without focal findings.      1. Medicare annual wellness visit, subsequent        2. Physical exam        3. Other specified hypothyroidism        4. Essential hypertension  Comprehensive Metabolic Panel, Lipid Panel      5. Stage 3a chronic kidney disease (Multi)        6. Anemia due to chronic kidney disease, unspecified CKD stage  Reticulocytes, Ferritin, Vitamin B12, Iron and TIBC      7. Encounter for screening mammogram for malignant neoplasm of breast  BI mammo bilateral screening tomosynthesis      8. Colon adenoma        9. Morbid obesity with BMI of 50.0-59.9, adult (Multi)        10. Mixed hyperlipidemia        11. Stress incontinence  Referral to Urology      12. Neuropathy  Referral to Neurology      13. Severe episode of recurrent major depressive disorder, without psychotic features (Multi)  Follow Up In [facility]          " Acne Type: Comedonal Lesions Extraction Method: 22 gauge needle Post-Care Instructions: I reviewed with the patient in detail post-care instructions. Patient is to keep the treatment areas dry overnight, and then apply bacitracin twice daily until healed. Patient may apply hydrogen peroxide soaks to remove any crusting. Render Number Of Lesions Treated: no Prep Text (Optional): Prior to removal the treatment areas were prepped in the usual fashion. Consent was obtained and risks were reviewed including but not limited to scarring, infection, bleeding, scabbing, incomplete removal, and allergy to anesthesia. Detail Level: Detailed

## 2024-06-06 ENCOUNTER — HOSPITAL ENCOUNTER (OUTPATIENT)
Dept: RADIOLOGY | Facility: HOSPITAL | Age: 69
Discharge: HOME | End: 2024-06-06
Payer: MEDICARE

## 2024-06-06 VITALS — HEIGHT: 64 IN | WEIGHT: 293 LBS | BODY MASS INDEX: 50.02 KG/M2

## 2024-06-06 DIAGNOSIS — Z12.31 ENCOUNTER FOR SCREENING MAMMOGRAM FOR MALIGNANT NEOPLASM OF BREAST: ICD-10-CM

## 2024-06-06 PROCEDURE — 77067 SCR MAMMO BI INCL CAD: CPT | Performed by: RADIOLOGY

## 2024-06-06 PROCEDURE — 77067 SCR MAMMO BI INCL CAD: CPT

## 2024-06-06 PROCEDURE — 77063 BREAST TOMOSYNTHESIS BI: CPT | Performed by: RADIOLOGY

## 2024-06-10 ENCOUNTER — OFFICE VISIT (OUTPATIENT)
Dept: ORTHOPEDIC SURGERY | Facility: HOSPITAL | Age: 69
End: 2024-06-10
Payer: MEDICARE

## 2024-06-10 DIAGNOSIS — M17.12 PRIMARY OSTEOARTHRITIS OF LEFT KNEE: Primary | ICD-10-CM

## 2024-06-10 DIAGNOSIS — M17.0 PRIMARY OSTEOARTHRITIS OF BOTH KNEES: ICD-10-CM

## 2024-06-10 PROCEDURE — 3008F BODY MASS INDEX DOCD: CPT | Performed by: STUDENT IN AN ORGANIZED HEALTH CARE EDUCATION/TRAINING PROGRAM

## 2024-06-10 PROCEDURE — 1159F MED LIST DOCD IN RCRD: CPT | Performed by: STUDENT IN AN ORGANIZED HEALTH CARE EDUCATION/TRAINING PROGRAM

## 2024-06-10 PROCEDURE — 1125F AMNT PAIN NOTED PAIN PRSNT: CPT | Performed by: STUDENT IN AN ORGANIZED HEALTH CARE EDUCATION/TRAINING PROGRAM

## 2024-06-10 PROCEDURE — 99214 OFFICE O/P EST MOD 30 MIN: CPT | Performed by: STUDENT IN AN ORGANIZED HEALTH CARE EDUCATION/TRAINING PROGRAM

## 2024-06-10 PROCEDURE — 99204 OFFICE O/P NEW MOD 45 MIN: CPT | Performed by: STUDENT IN AN ORGANIZED HEALTH CARE EDUCATION/TRAINING PROGRAM

## 2024-06-10 PROCEDURE — 1123F ACP DISCUSS/DSCN MKR DOCD: CPT | Performed by: STUDENT IN AN ORGANIZED HEALTH CARE EDUCATION/TRAINING PROGRAM

## 2024-06-10 ASSESSMENT — PAIN - FUNCTIONAL ASSESSMENT: PAIN_FUNCTIONAL_ASSESSMENT: 0-10

## 2024-06-10 ASSESSMENT — PAIN SCALES - GENERAL: PAINLEVEL_OUTOF10: 6

## 2024-06-10 ASSESSMENT — PAIN DESCRIPTION - DESCRIPTORS: DESCRIPTORS: ACHING;THROBBING;SHARP

## 2024-06-10 NOTE — PROGRESS NOTES
Albania Hinkle MD   Adult Reconstruction and Joint Replacement Surgery  Phone: [phone]     Fax: [phone]     INITIAL CONSULTATION    Name: Ariana Wills  : 1955  Date of Visit:  6/10/2024    CC: Left knee pain > Right knee pain     Clinical History:  Ariana Wills is a 68 y.o. female who presents with 1 years of BILATERAL knee pain. They were referred by Dr. Patel PCP.     Patient has tried the following Ice, NSAIDs, Tylenol (arthritis dosing) , Activity modification, Physical therapy, Corticosteroid injections , Xray, and Gabapentin . Date of last steroid injection: 2024 - lasted 3 weeks. Patient does have pain at night. Patient is able to walk 2-3 blocks. Patient is currently using cane as assistive device. Primarily complains of medial pain. Patient has difficulty with climbing stairs, descending stairs, walking , prolonged sitting , and walking on unlevel surfaces . The pain is significantly impacting her ability to perform activities of daily living. Patient reports no longer able to do activities such as walk without pain or sensation of knee buckling.     PROMs   No questionnaires on file.     Past Medical History:   Diagnosis Date    Abnormal weight gain 2016    Weight gain    Candidiasis, unspecified 2015    Yeast infection    Cellulitis, unspecified 2014    Cellulitis    Encounter for screening for malignant neoplasm of cervix 2015    Pap smear for cervical cancer screening    Encounter for screening mammogram for malignant neoplasm of breast 2015    Visit for screening mammogram    Encounter for screening mammogram for malignant neoplasm of breast 2015    Visit for screening mammogram    Furuncle of buttock 2019    Boil, buttock    GERD (gastroesophageal reflux disease)     Hereditary and idiopathic neuropathy, unspecified 2013    Idiopathic peripheral neuropathy    Hypertension     Hypothyroid     Lymphedema, not elsewhere  classified 05/22/2015    Lymphedema    Other symptoms and signs involving the musculoskeletal system 05/03/2016    Complaints of leg weakness    Pain in left hip 05/03/2016    Hip pain, acute, left    Personal history of other diseases of the nervous system and sense organs     History of peripheral neuropathy    Personal history of other diseases of the respiratory system 04/10/2015    History of acute bronchitis    Personal history of other diseases of the respiratory system 06/22/2015    History of sinusitis    Personal history of other specified conditions 06/28/2016    History of dizziness    Personal history of other specified conditions 05/22/2015    History of chest pain    Sleep apnea     Unspecified asthma with (acute) exacerbation ([facility]) 05/03/2016    Asthma exacerbation    Weakness of trunk musculature 11/01/2023     Documented in chart and reviewed.     Past Surgical History:   Procedure Laterality Date    CARDIAC CATHETERIZATION  04/03/2023    CARPAL TUNNEL RELEASE  09/27/2013    Neuroplasty Decompression Median Nerve At Carpal Tunnel    CHOLECYSTECTOMY  09/27/2013    Cholecystectomy    COLONOSCOPY      DILATION AND CURETTAGE OF UTERUS         Allergies: She has No Known Allergies.     Medications:  Current Outpatient Medications   Medication Instructions    acetaminophen (Tylenol) 500 mg tablet oral, Every 6 hours PRN    albuterol 90 mcg/actuation inhaler inhalation    atenolol (Tenormin) 100 mg tablet TAKE 1 TABLET DAILY    azelastine (Astelin) 137 mcg (0.1 %) nasal spray 2 sprays, Each Nostril, 2 times daily    cholecalciferol (Vitamin D-3) 5,000 Units tablet 1 tablet, oral, Daily    famotidine (PEPCID) 20 mg, oral, Nightly    flunisolide (Nasalide) 25 mcg (0.025 %) spray,non-aerosol nasal    gabapentin (NEURONTIN) 300 mg, oral, 2 times daily    glucosamine HCl-msm-chondroitn 750-375-400 mg tablet oral    hydroCHLOROthiazide (HYDRODiuril) 25 mg tablet TAKE 1 TABLET DAILY    levothyroxine  (Synthroid, Levoxyl) 50 mcg tablet TAKE 1 TABLET DAILY    lisinopril 40 mg tablet TAKE 1 TABLET DAILY    multivitamin capsule oral    rosuvastatin (CRESTOR) 10 mg, oral, Nightly       Family History   Problem Relation Name Age of Onset    Colon cancer Mother      Other (cardiac disorder) Mother      Heart failure Father      Other (INTERNAL BLEEDING) Brother      Asthma Other      Heart disease Other      Hypertension Other      Sleep disorder Other      Arthritis Other      Other (hay fever) Other       Documented in chart and reviewed.     Social History     Tobacco Use    Smoking status: Never    Smokeless tobacco: Never   Substance Use Topics    Alcohol use: Yes     Comment: 4-5 a year         Review of Systems: Review of systems completed with medical assistant intake. Please refer to this note.     Falls: The patient denies any recent falls or fall-related injuries.    Physical Exam:  BMI: 54, which is abnormal. Encouraged to lose weight and to follow up with PCP.    Constitutional: The patient is well-appearing and well groomed.     Neurological/Psychiatric: The patient is alert and oriented to person, place and time. The patient has a normal mood and affect.    Skin Examination: The skin over the right lower extremity, left lower extremity, right upper extremity, and left upper extremity is intact without any evidence of infection or rash.    Cardiovascular Examination: There are no varicosities and the skin is normal temperature, capillary refill normal, arterial pulses normal, no edema.    Lymphatic Examination: There is lymphatic swelling in bilateral lower extremities.    Neurological Examination: Bilateral lower extremities are grossly neurologically intact. Sensation normal, motor function normal.    Gait: The patient ambulates with an antalgic gait.     Right Hip Examination:  The skin is intact over the hip.    There is no tenderness over the greater trochanter.    Range of motion is full extension  "to 100 degrees of flexion.    The hip is stable without subluxation or dislocation.    The hip internally rotates to 15 degrees and externally rotates to 45 degrees.    There is no pain with hip motion.    Left Hip Examination:  The skin is intact over the hip.    There is no tenderness over the greater trochanter.    Range of motion is full extension to 100 degrees of flexion.    The hip is stable without subluxation or dislocation.    The hip internally rotates to 15 degrees and externally rotates to 45 degrees.    There is no pain with hip motion.    Left Knee Examination:  Examination of the left knee reveals the skin to be intact.    Difficult to discern effusion.     The alignment of the knee is Varus.    This deformity is not correctable.    There is tenderness to palpation over the joint line.    There is significant quadriceps atrophy.    Range of Motion: 15 to 90 degrees of flexion.    The knee is stable to varus-valgus stress and anterior-posterior stress.     There is moderate grinding with range of motion.    There is moderate patellofemoral crepitus.    Right Knee Examination:  Examination of the left knee reveals the skin to be intact.    Difficult to discern effusion.     The alignment of the knee is Varus.    This deformity is not correctable.    There is tenderness to palpation over the joint line.    There is significant quadriceps atrophy.    Range of Motion: 15 to 90 degrees of flexion.    The knee is stable to varus-valgus stress and anterior-posterior stress.     There is moderate grinding with range of motion.    There is moderate patellofemoral crepitus.    Prior Labs:   Lab Results   Component Value Date    WBC 8.2 05/30/2023    HGB 11.9 (L) 05/30/2024    HCT 36.9 05/30/2023    MCV 94 05/30/2023     05/30/2023      No results found for: \"INR\", \"PROTIME\"      Lab Results   Component Value Date    GLUCOSE 99 05/30/2024    CALCIUM 9.6 05/30/2024     05/30/2024    K 4.3 05/30/2024 " "   CO2 29 05/30/2024     05/30/2024    BUN 16 05/30/2024    CREATININE 1.07 (H) 05/30/2024      No results found for: \"CKTOTAL\", \"CKMB\", \"CKMBINDEX\", \"TROPONINI\"   No results found for: \"HGBA1C\"      No results found for: \"CRP\"   No results found for: \"SEDRATE\"     Radiographs:  Radiographs were personally reviewed today. There is evidence of severe LEFT knee osteoarthritis with MEDIAL bone on bone apposition.    Impression:  68 y.o. female presents with severe LEFT knee osteoarthritis with bone on bone apposition.     Diagnosis:  Primary osteoarthritis of left knee    Primary osteoarthritis of both knees    Recommendations / Plan:    I have discussed the options in detail with the patient. We have discussed anti-inflammatory medication, activity modification, physical therapy, corticosteroid injections, viscosupplementation injections, partial knee replacement surgery and total knee replacement surgery. The patient has not yet exhausted all conservative treatment measures.    The risks and benefits of all these treatment options have been discussed in detail.     The patient has tried at least 3 months of the above conservative treatments and continues to have disabling pain, impaired activities of daily living and worsened quality of life.  Reviewed the surgical optimization steps to optimize their chances for a successful joint replacement surgery.      Currently their BMI is 54.  They would need to lose significant weight before being scheduled for surgery. Discussed that obesity is a risk factor for continued progression of osteoarthritis. Each pound of weight loss offloads their hip and knee joints by 3-6 pounds.  The most effective of these options is weight loss mainly through restricting caloric intake.  A referral to a nutritionist was offered. A referral to bariatric surgery was offered.     A physical therapy prescription was ordered for the patient.  Patient will continue their home exercise " program. Strategies for pain management using over-the-counter anti-inflammatory medications reviewed.  Encouraged them to maintain range of motion and strength around the knee joints.  They will continue to implement these strategies in addressing their pain.       Recommend the patient continue optimizing nonsurgical treatment interventions as outlined above for management of their knee arthritis.  I would be happy to see them again at any point to discuss surgery if they are more optimized or to review progress with nonsurgical treatment of arthritis. The patient verbalizes understanding with the recommendations and treatment plan as outlined above and is in agreement.  Questions were addressed.    _____________  Albania Hinkle MD   Attending Orthopaedic Surgeon  [facility]    [facility]    Approximately 45 minutes were spent on the following tasks:              Preparing for the patient              Reviewing medical records              Taking a patient history              Performing a physical exam              Reviewing treatment options with the patient              Explaining the risks, potential benefits, and alternative to surgery  Explaining the expected rehabilitation after each treatment option  Explaining the potential long term expectations  Evaluating the diagnostic imaging     This office note was transcribed with dictation software.  Please excuse any typographical errors, program misunderstandings leading to inadvertent insertions or deletions of inappropriate wording, pronoun errors and other unintentional transcription errors not noticed on proof-reading.

## 2024-06-11 ENCOUNTER — OFFICE VISIT (OUTPATIENT)
Dept: UROLOGY | Facility: HOSPITAL | Age: 69
End: 2024-06-11
Payer: MEDICARE

## 2024-06-11 DIAGNOSIS — N39.3 STRESS INCONTINENCE: Primary | ICD-10-CM

## 2024-06-11 PROCEDURE — 3008F BODY MASS INDEX DOCD: CPT | Performed by: STUDENT IN AN ORGANIZED HEALTH CARE EDUCATION/TRAINING PROGRAM

## 2024-06-11 PROCEDURE — 1123F ACP DISCUSS/DSCN MKR DOCD: CPT | Performed by: STUDENT IN AN ORGANIZED HEALTH CARE EDUCATION/TRAINING PROGRAM

## 2024-06-11 PROCEDURE — 99214 OFFICE O/P EST MOD 30 MIN: CPT | Performed by: STUDENT IN AN ORGANIZED HEALTH CARE EDUCATION/TRAINING PROGRAM

## 2024-06-11 PROCEDURE — 99204 OFFICE O/P NEW MOD 45 MIN: CPT | Performed by: STUDENT IN AN ORGANIZED HEALTH CARE EDUCATION/TRAINING PROGRAM

## 2024-06-11 RX ORDER — OXYBUTYNIN CHLORIDE 5 MG/1
5 TABLET ORAL 2 TIMES DAILY
Qty: 60 TABLET | Refills: 2 | Status: SHIPPED | OUTPATIENT
Start: 2024-06-11 | End: 2024-09-09

## 2024-06-11 NOTE — PROGRESS NOTES
Subjective   Patient ID: Ariana Wills is a 68 y.o. female    HPI  68 y.o. female who presenting with a worsening bladder issue over the past year. She reports that every time she stands up, she experiences a sudden urge to urinate, often resulting in leakage. This urgency has been present for a while but has significantly worsened recently. She mentions that she does not wet the bed at night unless she tries to stand up. The patient has tried Kegel exercises without success. She denies recurrent urinary tract infections (UTIs) and has had only one bladder infection in the past. She has no history of hematuria. The patient has a history of arthritis, which has also worsened over the past year. She grew up with parents who smoked but does not currently smoke herself.         Review of Systems    All systems were reviewed. Anything negative was noted in the HPI.    Objective   Physical Exam    General: Well developed, well nourished, alert and cooperative, appears in no acute distress   Eyes: Non-injected conjunctiva, sclera clear, no proptosis   Cardiac: Extremities are warm and well perfused. No edema, cyanosis or pallor   Lungs: Breathing is easy, non-labored. Speaking in clear and complete sentences. Normal diaphragmatic movement   MSK: Ambulatory with steady gait, unassisted   Neuro: Alert and oriented to person, place, and time   Psych: Demonstrates good judgment and reason, without hallucinations, abnormal affect or abnormal behaviors   Skin: No obvious lesions, no rashes       No CVA tenderness bilaterally   No suprapubic pain or discomfort       Past Medical History:   Diagnosis Date    Abnormal weight gain 05/04/2016    Weight gain    Candidiasis, unspecified 05/22/2015    Yeast infection    Cellulitis, unspecified 09/07/2014    Cellulitis    Encounter for screening for malignant neoplasm of cervix 05/22/2015    Pap smear for cervical cancer screening    Encounter for screening mammogram for malignant  neoplasm of breast 05/22/2015    Visit for screening mammogram    Encounter for screening mammogram for malignant neoplasm of breast 05/22/2015    Visit for screening mammogram    Furuncle of buttock 09/18/2019    Boil, buttock    GERD (gastroesophageal reflux disease)     Hereditary and idiopathic neuropathy, unspecified 09/29/2013    Idiopathic peripheral neuropathy    Hypertension     Hypothyroid     Lymphedema, not elsewhere classified 05/22/2015    Lymphedema    Other symptoms and signs involving the musculoskeletal system 05/03/2016    Complaints of leg weakness    Pain in left hip 05/03/2016    Hip pain, acute, left    Personal history of other diseases of the nervous system and sense organs     History of peripheral neuropathy    Personal history of other diseases of the respiratory system 04/10/2015    History of acute bronchitis    Personal history of other diseases of the respiratory system 06/22/2015    History of sinusitis    Personal history of other specified conditions 06/28/2016    History of dizziness    Personal history of other specified conditions 05/22/2015    History of chest pain    Sleep apnea     Unspecified asthma with (acute) exacerbation ([facility]) 05/03/2016    Asthma exacerbation    Weakness of trunk musculature 11/01/2023         Past Surgical History:   Procedure Laterality Date    CARDIAC CATHETERIZATION  04/03/2023    CARPAL TUNNEL RELEASE  09/27/2013    Neuroplasty Decompression Median Nerve At Carpal Tunnel    CHOLECYSTECTOMY  09/27/2013    Cholecystectomy    COLONOSCOPY      DILATION AND CURETTAGE OF UTERUS             Assessment/Plan   Urinary incontinence, likely stress incontinence    68 y.o. female who presents for the above condition, We had a very long and extensive discussion with the patient regarding the pathophysiology, differential diagnosis, risk factor, management, natural history, incidence and diagnostic work-up of the condition.      Plan:  - Oxybutynin 5 mg/ml  -  Renal US  - Cystoscopy  - Follow up in 2-3 weeks        6/11/2024    Scrdom Attestation  By signing my name below, I, Ramu Chavira   attest that this documentation has been prepared under the direction and in the presence of Dr. Perez Wild

## 2024-06-13 ENCOUNTER — APPOINTMENT (OUTPATIENT)
Dept: RADIOLOGY | Facility: CLINIC | Age: 69
End: 2024-06-13
Payer: MEDICARE

## 2024-06-13 ENCOUNTER — HOSPITAL ENCOUNTER (OUTPATIENT)
Dept: RADIOLOGY | Facility: HOSPITAL | Age: 69
Discharge: HOME | End: 2024-06-13
Payer: MEDICARE

## 2024-06-13 DIAGNOSIS — M17.0 PRIMARY OSTEOARTHRITIS OF BOTH KNEES: ICD-10-CM

## 2024-06-13 DIAGNOSIS — M47.816 LUMBAR SPONDYLOSIS: ICD-10-CM

## 2024-06-13 DIAGNOSIS — R20.2 NUMBNESS AND TINGLING SENSATION OF SKIN: ICD-10-CM

## 2024-06-13 DIAGNOSIS — R20.0 NUMBNESS AND TINGLING SENSATION OF SKIN: ICD-10-CM

## 2024-06-13 PROCEDURE — 72148 MRI LUMBAR SPINE W/O DYE: CPT
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
## 2024-06-14 ENCOUNTER — APPOINTMENT (OUTPATIENT)
Dept: PRIMARY CARE | Facility: CLINIC | Age: 69
End: 2024-06-14
Payer: MEDICARE

## 2024-06-14 DIAGNOSIS — F33.2 SEVERE EPISODE OF RECURRENT MAJOR DEPRESSIVE DISORDER, WITHOUT PSYCHOTIC FEATURES (MULTI): ICD-10-CM

## 2024-06-14 ASSESSMENT — ANXIETY QUESTIONNAIRES
3. WORRYING TOO MUCH ABOUT DIFFERENT THINGS: MORE THAN HALF THE DAYS
6. BECOMING EASILY ANNOYED OR IRRITABLE: NEARLY EVERY DAY
1. FEELING NERVOUS, ANXIOUS, OR ON EDGE: MORE THAN HALF THE DAYS
GAD7 TOTAL SCORE: 11
4. TROUBLE RELAXING: SEVERAL DAYS
7. FEELING AFRAID AS IF SOMETHING AWFUL MIGHT HAPPEN: NOT AT ALL
5. BEING SO RESTLESS THAT IT IS HARD TO SIT STILL: SEVERAL DAYS
IF YOU CHECKED OFF ANY PROBLEMS ON THIS QUESTIONNAIRE, HOW DIFFICULT HAVE THESE PROBLEMS MADE IT FOR YOU TO DO YOUR WORK, TAKE CARE OF THINGS AT HOME, OR GET ALONG WITH OTHER PEOPLE: SOMEWHAT DIFFICULT
2. NOT BEING ABLE TO STOP OR CONTROL WORRYING: MORE THAN HALF THE DAYS

## 2024-06-14 ASSESSMENT — PATIENT HEALTH QUESTIONNAIRE - PHQ9
10. IF YOU CHECKED OFF ANY PROBLEMS, HOW DIFFICULT HAVE THESE PROBLEMS MADE IT FOR YOU TO DO YOUR WORK, TAKE CARE OF THINGS AT HOME, OR GET ALONG WITH OTHER PEOPLE: SOMEWHAT DIFFICULT
8. MOVING OR SPEAKING SO SLOWLY THAT OTHER PEOPLE COULD HAVE NOTICED. OR THE OPPOSITE, BEING SO FIGETY OR RESTLESS THAT YOU HAVE BEEN MOVING AROUND A LOT MORE THAN USUAL: NOT AT ALL
SUM OF ALL RESPONSES TO PHQ9 QUESTIONS 1 & 2: 5
SUM OF ALL RESPONSES TO PHQ QUESTIONS 1-9: 14
1. LITTLE INTEREST OR PLEASURE IN DOING THINGS: NEARLY EVERY DAY
2. FEELING DOWN, DEPRESSED OR HOPELESS: MORE THAN HALF THE DAYS
9. THOUGHTS THAT YOU WOULD BE BETTER OFF DEAD, OR OF HURTING YOURSELF: SEVERAL DAYS
5. POOR APPETITE OR OVEREATING: SEVERAL DAYS
6. FEELING BAD ABOUT YOURSELF - OR THAT YOU ARE A FAILURE OR HAVE LET YOURSELF OR YOUR FAMILY DOWN: NEARLY EVERY DAY
4. FEELING TIRED OR HAVING LITTLE ENERGY: NEARLY EVERY DAY
3. TROUBLE FALLING OR STAYING ASLEEP: SEVERAL DAYS
7. TROUBLE CONCENTRATING ON THINGS, SUCH AS READING THE NEWSPAPER OR WATCHING TELEVISION: NOT AT ALL

## 2024-06-17 ENCOUNTER — APPOINTMENT (OUTPATIENT)
Dept: NEUROLOGY | Facility: CLINIC | Age: 69
End: 2024-06-17
Payer: MEDICARE

## 2024-06-17 ENCOUNTER — LAB (OUTPATIENT)
Dept: LAB | Facility: LAB | Age: 69
End: 2024-06-17
Payer: MEDICARE

## 2024-06-17 VITALS
BODY MASS INDEX: 50.02 KG/M2 | HEIGHT: 64 IN | TEMPERATURE: 96.9 F | SYSTOLIC BLOOD PRESSURE: 126 MMHG | WEIGHT: 293 LBS | DIASTOLIC BLOOD PRESSURE: 72 MMHG | HEART RATE: 48 BPM

## 2024-06-17 DIAGNOSIS — R53.1 WEAKNESS: ICD-10-CM

## 2024-06-17 DIAGNOSIS — G62.9 NEUROPATHY: ICD-10-CM

## 2024-06-17 DIAGNOSIS — R53.1 WEAKNESS: Primary | ICD-10-CM

## 2024-06-17 LAB
CENTROMERE B AB SER-ACNC: 0.2 AI
CHROMATIN AB SERPL-ACNC: <0.2 AI
CK SERPL-CCNC: 38 U/L (ref 0–215)
DSDNA AB SER-ACNC: 1 IU/ML
ENA JO1 AB SER QL IA: <0.2 AI
ENA RNP AB SER IA-ACNC: <0.2 AI
ENA SCL70 AB SER QL IA: <0.2 AI
ENA SM AB SER IA-ACNC: <0.2 AI
ENA SM+RNP AB SER QL IA: <0.2 AI
ENA SS-A AB SER IA-ACNC: <0.2 AI
ENA SS-B AB SER IA-ACNC: <0.2 AI
ERYTHROCYTE [SEDIMENTATION RATE] IN BLOOD BY WESTERGREN METHOD: 24 MM/H (ref 0–30)
PROT SERPL-MCNC: 6.6 G/DL (ref 6.4–8.2)
RIBOSOMAL P AB SER-ACNC: <0.2 AI

## 2024-06-17 PROCEDURE — 86225 DNA ANTIBODY NATIVE: CPT

## 2024-06-17 PROCEDURE — 3078F DIAST BP <80 MM HG: CPT | Performed by: PSYCHIATRY & NEUROLOGY

## 2024-06-17 PROCEDURE — 86334 IMMUNOFIX E-PHORESIS SERUM: CPT

## 2024-06-17 PROCEDURE — 1159F MED LIST DOCD IN RCRD: CPT | Performed by: PSYCHIATRY & NEUROLOGY

## 2024-06-17 PROCEDURE — 82550 ASSAY OF CK (CPK): CPT

## 2024-06-17 PROCEDURE — 85652 RBC SED RATE AUTOMATED: CPT

## 2024-06-17 PROCEDURE — 82085 ASSAY OF ALDOLASE: CPT

## 2024-06-17 PROCEDURE — 36415 COLL VENOUS BLD VENIPUNCTURE: CPT

## 2024-06-17 PROCEDURE — 3008F BODY MASS INDEX DOCD: CPT | Performed by: PSYCHIATRY & NEUROLOGY

## 2024-06-17 PROCEDURE — 1123F ACP DISCUSS/DSCN MKR DOCD: CPT | Performed by: PSYCHIATRY & NEUROLOGY

## 2024-06-17 PROCEDURE — 3074F SYST BP LT 130 MM HG: CPT | Performed by: PSYCHIATRY & NEUROLOGY

## 2024-06-17 PROCEDURE — 86038 ANTINUCLEAR ANTIBODIES: CPT

## 2024-06-17 PROCEDURE — 99204 OFFICE O/P NEW MOD 45 MIN: CPT | Performed by: PSYCHIATRY & NEUROLOGY

## 2024-06-17 PROCEDURE — 86235 NUCLEAR ANTIGEN ANTIBODY: CPT

## 2024-06-17 PROCEDURE — 84165 PROTEIN E-PHORESIS SERUM: CPT

## 2024-06-17 PROCEDURE — 84155 ASSAY OF PROTEIN SERUM: CPT

## 2024-06-17 PROCEDURE — 1036F TOBACCO NON-USER: CPT | Performed by: PSYCHIATRY & NEUROLOGY

## 2024-06-17 ASSESSMENT — PATIENT HEALTH QUESTIONNAIRE - PHQ9
SUM OF ALL RESPONSES TO PHQ QUESTIONS 1-9: 10
3. TROUBLE FALLING OR STAYING ASLEEP: SEVERAL DAYS
9. THOUGHTS THAT YOU WOULD BE BETTER OFF DEAD, OR OF HURTING YOURSELF: NOT AT ALL
SUM OF ALL RESPONSES TO PHQ9 QUESTIONS 1 & 2: 3
8. MOVING OR SPEAKING SO SLOWLY THAT OTHER PEOPLE COULD HAVE NOTICED. OR THE OPPOSITE, BEING SO FIGETY OR RESTLESS THAT YOU HAVE BEEN MOVING AROUND A LOT MORE THAN USUAL: NOT AT ALL
5. POOR APPETITE OR OVEREATING: SEVERAL DAYS
7. TROUBLE CONCENTRATING ON THINGS, SUCH AS READING THE NEWSPAPER OR WATCHING TELEVISION: NOT AT ALL
2. FEELING DOWN, DEPRESSED OR HOPELESS: SEVERAL DAYS
10. IF YOU CHECKED OFF ANY PROBLEMS, HOW DIFFICULT HAVE THESE PROBLEMS MADE IT FOR YOU TO DO YOUR WORK, TAKE CARE OF THINGS AT HOME, OR GET ALONG WITH OTHER PEOPLE: SOMEWHAT DIFFICULT
4. FEELING TIRED OR HAVING LITTLE ENERGY: NEARLY EVERY DAY
6. FEELING BAD ABOUT YOURSELF - OR THAT YOU ARE A FAILURE OR HAVE LET YOURSELF OR YOUR FAMILY DOWN: MORE THAN HALF THE DAYS
1. LITTLE INTEREST OR PLEASURE IN DOING THINGS: MORE THAN HALF THE DAYS

## 2024-06-17 ASSESSMENT — LIFESTYLE VARIABLES
HOW OFTEN DO YOU HAVE A DRINK CONTAINING ALCOHOL: MONTHLY OR LESS
SKIP TO QUESTIONS 9-10: 1
AUDIT-C TOTAL SCORE: 1
HOW OFTEN DO YOU HAVE SIX OR MORE DRINKS ON ONE OCCASION: NEVER
HOW MANY STANDARD DRINKS CONTAINING ALCOHOL DO YOU HAVE ON A TYPICAL DAY: 1 OR 2

## 2024-06-17 NOTE — PROGRESS NOTES
Consulting Physician: Dr. Garza    Chief Complaint: Neuropathy    History Of Present Illness  Ariana Wills is a 69 y.o. female presenting with neuropathy.    Last year, she started therapy for lymphedema.  Since doing therapy, she developed weakness in her legs.      She states that over 20 years ago, she developed numbness in hands and feet.  She was diagnosed with neuropathy.  Over the years, she notes that the numbness has gotten worse.  Currently, the numbness is mainly in the toes in lower extremities.  She feels weakness in her thighs and calves.  She notes difficulty going up and down stairs.  She notes weakness in her hands (she is unsure if this is due to arthritis).  She notes pins and needles sensation in her hands and feet.  She is currently on Gabapentin 300 mg BID.  She is unsure if it helps.  She does note chronic low back pain.       Past Medical History  Past Medical History:   Diagnosis Date    Abnormal weight gain 05/04/2016    Weight gain    Candidiasis, unspecified 05/22/2015    Yeast infection    Cellulitis, unspecified 09/07/2014    Cellulitis    Encounter for screening for malignant neoplasm of cervix 05/22/2015    Pap smear for cervical cancer screening    Encounter for screening mammogram for malignant neoplasm of breast 05/22/2015    Visit for screening mammogram    Encounter for screening mammogram for malignant neoplasm of breast 05/22/2015    Visit for screening mammogram    Furuncle of buttock 09/18/2019    Boil, buttock    GERD (gastroesophageal reflux disease)     Hereditary and idiopathic neuropathy, unspecified 09/29/2013    Idiopathic peripheral neuropathy    Hypertension     Hypothyroid     Lymphedema, not elsewhere classified 05/22/2015    Lymphedema    Other symptoms and signs involving the musculoskeletal system 05/03/2016    Complaints of leg weakness    Pain in left hip 05/03/2016    Hip pain, acute, left    Personal history of other diseases of the nervous system and  sense organs     History of peripheral neuropathy    Personal history of other diseases of the respiratory system 04/10/2015    History of acute bronchitis    Personal history of other diseases of the respiratory system 06/22/2015    History of sinusitis    Personal history of other specified conditions 06/28/2016    History of dizziness    Personal history of other specified conditions 05/22/2015    History of chest pain    Sleep apnea     Unspecified asthma with (acute) exacerbation ([facility]) 05/03/2016    Asthma exacerbation    Weakness of trunk musculature 11/01/2023       Surgical History  Past Surgical History:   Procedure Laterality Date    CARDIAC CATHETERIZATION  04/03/2023    CARPAL TUNNEL RELEASE  09/27/2013    Neuroplasty Decompression Median Nerve At Carpal Tunnel    CHOLECYSTECTOMY  09/27/2013    Cholecystectomy    COLONOSCOPY      DILATION AND CURETTAGE OF UTERUS         Family History  Family History   Problem Relation Name Age of Onset    Colon cancer Mother      Other (cardiac disorder) Mother      Heart failure Father      Other (INTERNAL BLEEDING) Brother      Asthma Other      Heart disease Other      Hypertension Other      Sleep disorder Other      Arthritis Other      Other (hay fever) Other          Social History   reports that she has never smoked. She has never used smokeless tobacco. She reports current alcohol use. She reports that she does not use drugs.     Allergies  Patient has no known allergies.    Medications    Current Outpatient Medications:     acetaminophen (Tylenol) 500 mg tablet, Take by mouth every 6 hours if needed., Disp: , Rfl:     albuterol 90 mcg/actuation inhaler, Inhale., Disp: , Rfl:     atenolol (Tenormin) 100 mg tablet, TAKE 1 TABLET DAILY (Patient taking differently: Take 0.5 tablets (50 mg) by mouth once daily.), Disp: 90 tablet, Rfl: 3    azelastine (Astelin) 137 mcg (0.1 %) nasal spray, Administer 2 sprays into each nostril 2 times a day., Disp: , Rfl:      "cholecalciferol (Vitamin D-3) 5,000 Units tablet, Take 1 tablet (5,000 Units) by mouth once daily., Disp: , Rfl:     famotidine (Pepcid) 20 mg tablet, TAKE 1 TABLET BY MOUTH EVERYDAY AT BEDTIME, Disp: 90 tablet, Rfl: 1    flunisolide (Nasalide) 25 mcg (0.025 %) spray,non-aerosol, Administer into affected nostril(s)., Disp: , Rfl:     gabapentin (Neurontin) 300 mg capsule, TAKE 1 CAPSULE BY MOUTH TWICE A DAY, Disp: 60 capsule, Rfl: 3    glucosamine HCl-msm-chondroitn 750-375-400 mg tablet, Take by mouth., Disp: , Rfl:     hydroCHLOROthiazide (HYDRODiuril) 25 mg tablet, TAKE 1 TABLET DAILY, Disp: 90 tablet, Rfl: 3    levothyroxine (Synthroid, Levoxyl) 50 mcg tablet, TAKE 1 TABLET DAILY, Disp: 90 tablet, Rfl: 3    lisinopril 40 mg tablet, TAKE 1 TABLET DAILY, Disp: 90 tablet, Rfl: 3    multivitamin capsule, Take by mouth., Disp: , Rfl:     oxybutynin (Ditropan) 5 mg tablet, Take 1 tablet (5 mg) by mouth 2 times a day., Disp: 60 tablet, Rfl: 2    rosuvastatin (Crestor) 10 mg tablet, Take 1 tablet (10 mg) by mouth once daily at bedtime., Disp: , Rfl:       Last Recorded Vitals   There were no vitals taken for this visit.    Objective:  /72 (BP Location: Right arm, Patient Position: Sitting, BP Cuff Size: Adult)   Pulse (!) 48   Temp 36.1 °C (96.9 °F) (Temporal)   Ht 1.626 m (5' 4\")   Wt 145 kg (319 lb 11.2 oz)   LMP  (LMP Unknown)   BMI 54.88 kg/m²     Gen: NAD  Neuro:  --HIF: A&O X 3, repetition and naming intact  --CN:  PERRLA, EOMI, VFF, no visible facial asymmetry, facial sensation intact, no tongue or palatal deviation, SCM intact  --Motor: Moves all 4 extremities equally; no focal deficits except for hip flexion - 4  --Sensory: Pin is reduced in the toes  --Reflex: absent throughout, toes down  --Cerebellum: FTN and HTS intact  --Gait: Wide Based Gait    Relevant Results  Lab Results   Component Value Date    WBC 8.2 05/30/2023    HGB 11.9 (L) 05/30/2024    HCT 36.9 05/30/2023    MCV 94 05/30/2023    " " 05/30/2023       Lab Results   Component Value Date    GLUCOSE 99 05/30/2024    CALCIUM 9.6 05/30/2024     05/30/2024    K 4.3 05/30/2024    CO2 29 05/30/2024     05/30/2024    BUN 16 05/30/2024    CREATININE 1.07 (H) 05/30/2024       No results found for: \"HGBA1C\"    Lab Results   Component Value Date    CHOL 123 05/30/2024    CHOL 116 05/30/2023    CHOL 149 04/22/2022     Lab Results   Component Value Date    HDL 52.2 05/30/2024    HDL 45.3 05/30/2023    HDL 52.2 04/22/2022     Lab Results   Component Value Date    LDLCALC 49 05/30/2024     Lab Results   Component Value Date    TRIG 111 05/30/2024    TRIG 83 05/30/2023    TRIG 70 04/22/2022     No components found for: \"CHOLHDL\"    Vitamin B12 437       Assessment:  This is a 69 year old female presenting for evaluation of weakness.  She has a history of polyneuropathy.  Over the past year, she has noted weakness in her arms and legs.  On exam, she has mild proximal lower extremity weakness.  Ddx includes myopathy.    Plan:  - labs: CK, Aldolsae, B6, EUNICE, SPEP with FEI  - EMG/NCV        Humberto Barlow MD  [facility]  Department of Neurology      A copy of this note was sent to the referring provider.    "

## 2024-06-19 ENCOUNTER — HOSPITAL ENCOUNTER (OUTPATIENT)
Dept: RADIOLOGY | Facility: HOSPITAL | Age: 69
Discharge: HOME | End: 2024-06-19
Payer: MEDICARE

## 2024-06-19 DIAGNOSIS — N39.3 STRESS INCONTINENCE: ICD-10-CM

## 2024-06-19 LAB
ANA PATTERN: ABNORMAL
ANA SER QL HEP2 SUBST: POSITIVE
ANA TITR SER IF: ABNORMAL {TITER}

## 2024-06-19 PROCEDURE — 76770 US EXAM ABDO BACK WALL COMP: CPT | Performed by: STUDENT IN AN ORGANIZED HEALTH CARE EDUCATION/TRAINING PROGRAM

## 2024-06-19 PROCEDURE — 76770 US EXAM ABDO BACK WALL COMP: CPT

## 2024-06-20 ENCOUNTER — TREATMENT (OUTPATIENT)
Dept: OCCUPATIONAL THERAPY | Facility: CLINIC | Age: 69
End: 2024-06-20
Payer: MEDICARE

## 2024-06-20 DIAGNOSIS — I89.0 LYMPHEDEMA OF BOTH LOWER EXTREMITIES: ICD-10-CM

## 2024-06-20 DIAGNOSIS — R29.898 WEAKNESS OF BOTH LOWER EXTREMITIES: ICD-10-CM

## 2024-06-20 DIAGNOSIS — M79.604 PAIN IN BOTH LOWER EXTREMITIES: ICD-10-CM

## 2024-06-20 DIAGNOSIS — M79.605 PAIN IN BOTH LOWER EXTREMITIES: ICD-10-CM

## 2024-06-20 PROBLEM — F43.21 ADJUSTMENT DISORDER WITH DEPRESSED MOOD: Status: ACTIVE | Noted: 2024-06-20

## 2024-06-20 LAB
ALBUMIN: 3.6 G/DL (ref 3.4–5)
ALPHA 1 GLOBULIN: 0.3 G/DL (ref 0.2–0.6)
ALPHA 2 GLOBULIN: 0.7 G/DL (ref 0.4–1.1)
BETA GLOBULIN: 1 G/DL (ref 0.5–1.2)
GAMMA GLOBULIN: 1 G/DL (ref 0.5–1.4)
IMMUNOFIXATION COMMENT: NORMAL
PATH REVIEW - SERUM IMMUNOFIXATION: NORMAL
PATH REVIEW-SERUM PROTEIN ELECTROPHORESIS: NORMAL
PROTEIN ELECTROPHORESIS COMMENT: NORMAL

## 2024-06-20 PROCEDURE — 97110 THERAPEUTIC EXERCISES: CPT | Mod: GO

## 2024-06-20 PROCEDURE — 97140 MANUAL THERAPY 1/> REGIONS: CPT | Mod: GO

## 2024-06-20 PROCEDURE — 97530 THERAPEUTIC ACTIVITIES: CPT | Mod: GO,59

## 2024-06-20 ASSESSMENT — PAIN DESCRIPTION - DESCRIPTORS: DESCRIPTORS: HEAVINESS;ACHING;DISCOMFORT

## 2024-06-20 ASSESSMENT — PAIN SCALES - GENERAL: PAINLEVEL_OUTOF10: 5 - MODERATE PAIN

## 2024-06-20 ASSESSMENT — PAIN - FUNCTIONAL ASSESSMENT: PAIN_FUNCTIONAL_ASSESSMENT: 0-10

## 2024-06-20 NOTE — PROGRESS NOTES
Occupational Therapy    Occupational Therapy Treatment/ DC       Name: Ariana Wills  MRN: [id number]  : 1955  Date: 24  Time Calculation  Start Time: 830  Stop Time: 927  Time Calculation (min): 57 min    Assessment:  Pt I with donning/doffing garments daily, intermittent with MLD and TE for muscle pumping for lymph flow.  New gamrents arrived with good fitting.  Will DC pt this therapy cycle and rec to return in 6 months for recheck. Pt in agreement      Plan: DC pt this date        Subjective   General:  OT Last Visit  OT Received On: 24     Precautions:  Precautions Comment: NA  Pain Assessment:  Pain Assessment  Pain Assessment: 0-10  0-10 (Numeric) Pain Score: 5 - Moderate pain  Pain Type: Chronic pain  Pain Location: Knee  Pain Orientation: Right, Left  Pain Descriptors: Heaviness, Aching, Discomfort  Pain Frequency: Intermittent  Pain Onset: Ongoing    Objective       Therapy/Activity: Therapeutic Exercise  Therapeutic Exercise Performed: Yes  Therapeutic Exercise Activity 1: reviewed HEP with TE for muscle pumping in order to promote lymph flow and increase joint mobility.  provided handout for exercises for pain free motion for BLES.    Therapeutic Activity  Therapeutic Activity Performed: Yes  Therapeutic Activity 1: re check    Manual Therapy  Manual Therapy Performed: Yes  Manual Therapy Activity 1: girth measurements with volume increase noted this date  Manual Therapy Activity 2: reviewed MLD with mod VCing due to following in sequencial order. Pt able to complete with good carry over  Lymphedema Assessment    Lymphedema Assessments:  Lymphedema Assessments: Lower extremities     Right Lower Extremity:  R Metatarsal (cm): 22.5 cm  R Heel Y Angle: 37 cm  R Ankle (cm): 30 cm  R 10 cm Above Ankle (cm): 43 cm  R 20 cm Above Ankle (cm): 56.5 cm  R 30 cm Above Ankle (cm): 64 cm  R 40 cm Above Ankle (cm): 0 cm  R Knee (cm): 61 cm  R 10 cm Above Knee (cm): 79 cm  R 20 cm Above Knee  (cm): 83.5 cm  R 30 cm Above Knee (cm): 0 cm  R 40 cm Above Knee (cm): 0 cm  R 50 cm Above Knee (cm): 0 cm  Right lower extremity total: 476.5  Left Lower Extremity:  L Metatarsal (cm): 21.8 cm  L Heel Y Angle: 36 cm  L Ankle (cm): 32 cm  L 10 cm Above Ankle (cm): 45.5 cm  L 20 cm Above Ankle (cm): 59.5 cm  L 30 cm Above Ankle (cm): 60.5 cm  L 40 cm Above Ankle (cm): 0 cm  L Knee (cm): 62 cm  L 10 cm Above Knee (cm): 79.3 cm  L 20 cm Above Knee (cm): 81 cm  L 30 cm Above Knee (cm): 0 cm  L 40 cm Above Knee (cm): 0 cm  L 50 cm Above Knee (cm): 0 cm  Left Lower extremity total: 477.6  LE Skin Appearance/Condition and Girth:  Edema - Pitting: Y  Pain Assessment:  Pain Assessment: 0-10  0-10 (Numeric) Pain Score: 5 - Moderate pain  Pain Type: Chronic pain  Pain Location: Knee  Pain Orientation: Right, Left  Pain Descriptors: Heaviness, Aching, Discomfort  Pain Frequency: Intermittent  Pain Onset: Ongoing  Therapy Precautions:  Precautions Comment: NA      Outcome Measures:  OT Adult Other Outcome Measures  Lymphedema Life Impact Scale (LLIS): 28 (DC)    OP EDUCATION:       Goals:  Resolved       OT/lymphedema goals        Pt will be I with stating and demonstrating home exercise program in order to improve patients ability to perform self-care, household, work and/or leisure tasks.  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 and following ortho dr for assessment and tx               Pt will demo 5% cm  of volume reduction with BLE in order for proper fitting of medical compression garments and increase ease in transfers and IADLs  (Met)       Start:  10/10/23    Expected End:  06/28/24    Resolved:   06/20/24         Pt will I perform skin care for infection prevention and integrity of skin  (Met)       Start:  10/10/23    Expected End:  03/29/24    Resolved:  04/11/24         I with donning/doffing medical compression garments with AE as needed  (Met)       Start:  10/10/23    Expected End:  06/28/24    Resolved:  06/20/24

## 2024-06-21 ENCOUNTER — DOCUMENTATION (OUTPATIENT)
Dept: BEHAVIORAL HEALTH | Facility: CLINIC | Age: 69
End: 2024-06-21
Payer: MEDICARE

## 2024-06-21 DIAGNOSIS — F43.21 ADJUSTMENT DISORDER WITH DEPRESSED MOOD: ICD-10-CM

## 2024-06-21 NOTE — PROGRESS NOTES
[facility] Psychiatry Consult Note     Ariana Wills is a 69 y.o., referred to Collaborative Care for symptoms of depression and anxiety. I have reviewed the patient with the behavioral health manager and reviewed the patient's electronic record.    Recommendations:   With her chronic pain component, as well as depression consideration of starting Cymbalta. For the 65+ age range, recommended starting at lower dose of 30mg and can consider increase to target dose of 60mg after 2 weeks. As she is seeing Neurology and Pain Management, I would recommend checking in with both teams prior to starting the medication for any different preferences on their end.     Plans to work in Collaborative Care on active coping skills, rather than passive actions such as sleeping as a way to avoid. She notes low self-esteem and grief about losing her mobility and youth. Processing on where she in v. Where she would like to be in life stages.       Collateral with [facility]:   -uncontrolled depression in relation to chronic pain (spinal stenosis, lower extremity pain and lymphedema, chronic low back pain, and shoulder pain) impeded what she needs and want to do, worsening after group home around 5 years ago  -she reports low mood/depression for years, nots improvement when around her grandkids but then will feel worse when unable to get down on the floor to play with them  -Reports low appetite, however feels she used to over eat.    -Sleep is dependent on her pain level and has an hour long leg routine prior to bedtime to help mitigate pain and reports compliance to her CPAP.  -She is tired most of the day and does attribute it some to her depression    -Currently using naps as ways to avoid feeling down/depressed.    -Misses gardening and knitting.  -She does enjoy fishing, coloring, and [employer].  -She is currently working with OT, last seen 05/15/2024.  -Working with Neurology and Pain Management.  -Struggles with interpersonal dynamic with    -3 children, good relationships with them (2 daughters and 1 son)  -Unsure of family mental health history  -No prior psychotropic medications, but would be open  -No prior therapy, suicide attempts, nor inpatient admissions    -She will drink up to 1L of Diet Pepsi per day    Patient Health Questionnaire-9 Score: 10 (6/17/2024  8:00 AM)  WYATT-7 Total Score: 11 (6/14/2024 12:30 PM)    The above treatment considerations and suggestions are based on consultations with the patient's care manager and a review of information available in the electronic medical record. I have not personally examined the patient. All recommendations should be implemented with consideration of the patient's relevant prior history and current clinical status. Please feel free to call me with any questions about the care of this patient. I can easily be reached through [employer].

## 2024-06-24 ENCOUNTER — TELEPHONE (OUTPATIENT)
Dept: PRIMARY CARE | Facility: CLINIC | Age: 69
End: 2024-06-24
Payer: MEDICARE

## 2024-06-24 DIAGNOSIS — F33.2 SEVERE EPISODE OF RECURRENT MAJOR DEPRESSIVE DISORDER, WITHOUT PSYCHOTIC FEATURES (MULTI): Primary | ICD-10-CM

## 2024-06-24 NOTE — TELEPHONE ENCOUNTER
Hi Doctors, thank you for helping taking care of this patient.    Pt's collaborative care psychiatrist Dr. Duffy suggested that we try Cymbalta for pt's depression, but she would like to check with all of you to see if this is okay with you. Thank you for all of your inputs in advance.    Dr. Garza

## 2024-06-26 ENCOUNTER — PROCEDURE VISIT (OUTPATIENT)
Dept: UROLOGY | Facility: HOSPITAL | Age: 69
End: 2024-06-26
Payer: MEDICARE

## 2024-06-26 DIAGNOSIS — N39.3 STRESS INCONTINENCE: Primary | ICD-10-CM

## 2024-06-26 DIAGNOSIS — Z79.2 PROPHYLACTIC ANTIBIOTIC: ICD-10-CM

## 2024-06-26 PROCEDURE — 99213 OFFICE O/P EST LOW 20 MIN: CPT | Performed by: STUDENT IN AN ORGANIZED HEALTH CARE EDUCATION/TRAINING PROGRAM

## 2024-06-26 PROCEDURE — 52000 CYSTOURETHROSCOPY: CPT | Performed by: STUDENT IN AN ORGANIZED HEALTH CARE EDUCATION/TRAINING PROGRAM

## 2024-06-26 RX ORDER — CIPROFLOXACIN 500 MG/1
500 TABLET ORAL ONCE
Status: SHIPPED | OUTPATIENT
Start: 2024-06-26

## 2024-06-26 ASSESSMENT — PAIN SCALES - GENERAL: PAINLEVEL: 0-NO PAIN

## 2024-06-26 NOTE — PROGRESS NOTES
Subjective   Patient ID: Ariana Wills is a 69 y.o. female    HPI  69 y.o. female who presenting for cystoscopic evaluation with a worsening bladder issue over the past year. She reports that every time she stands up, she experiences a sudden urge to urinate, often resulting in leakage. This urgency has been present for a while but has significantly worsened recently. She mentions that she does not wet the bed at night unless she tries to stand up. The patient has tried Kegel exercises without success. She denies recurrent urinary tract infections (UTIs) and has had only one bladder infection in the past. She has no history of hematuria. The patient has a history of arthritis, which has also worsened over the past year. She grew up with parents who smoked but does not currently smoke herself.    The most recent Renal US, conducted on 6/19/2024, revealed:  1. Unremarkable urinary tract ultrasound        Review of Systems    All systems were reviewed. Anything negative was noted in the HPI.    Objective   Physical Exam    General: Well developed, well nourished, alert and cooperative, appears in no acute distress   Eyes: Non-injected conjunctiva, sclera clear, no proptosis   Cardiac: Extremities are warm and well perfused. No edema, cyanosis or pallor   Lungs: Breathing is easy, non-labored. Speaking in clear and complete sentences. Normal diaphragmatic movement   MSK: Ambulatory with steady gait, unassisted   Neuro: Alert and oriented to person, place, and time   Psych: Demonstrates good judgment and reason, without hallucinations, abnormal affect or abnormal behaviors   Skin: No obvious lesions, no rashes       No CVA tenderness bilaterally   No suprapubic pain or discomfort       Past Medical History:   Diagnosis Date    Abnormal weight gain 05/04/2016    Weight gain    Candidiasis, unspecified 05/22/2015    Yeast infection    Cellulitis, unspecified 09/07/2014    Cellulitis    Encounter for screening for  malignant neoplasm of cervix 05/22/2015    Pap smear for cervical cancer screening    Encounter for screening mammogram for malignant neoplasm of breast 05/22/2015    Visit for screening mammogram    Encounter for screening mammogram for malignant neoplasm of breast 05/22/2015    Visit for screening mammogram    Furuncle of buttock 09/18/2019    Boil, buttock    GERD (gastroesophageal reflux disease)     Hereditary and idiopathic neuropathy, unspecified 09/29/2013    Idiopathic peripheral neuropathy    Hypertension     Hypothyroid     Lymphedema, not elsewhere classified 05/22/2015    Lymphedema    Other symptoms and signs involving the musculoskeletal system 05/03/2016    Complaints of leg weakness    Pain in left hip 05/03/2016    Hip pain, acute, left    Personal history of other diseases of the nervous system and sense organs     History of peripheral neuropathy    Personal history of other diseases of the respiratory system 04/10/2015    History of acute bronchitis    Personal history of other diseases of the respiratory system 06/22/2015    History of sinusitis    Personal history of other specified conditions 06/28/2016    History of dizziness    Personal history of other specified conditions 05/22/2015    History of chest pain    Sleep apnea     Unspecified asthma with (acute) exacerbation ([facility]) 05/03/2016    Asthma exacerbation    Weakness of trunk musculature 11/01/2023         Past Surgical History:   Procedure Laterality Date    CARDIAC CATHETERIZATION  04/03/2023    CARPAL TUNNEL RELEASE  09/27/2013    Neuroplasty Decompression Median Nerve At Carpal Tunnel    CHOLECYSTECTOMY  09/27/2013    Cholecystectomy    COLONOSCOPY      DILATION AND CURETTAGE OF UTERUS         Procedure:  The patient was prepped using a Betadine solution. Lidocaine jelly was instilled into the urethra. The flexible cystoscope was sterilely inserted into the urethra and formal cystoscopy performed in a systematic fashion. For  detailed findings of the procedure, please see Dr. Wild’s remarks below  Scope A used, Cipro 500 mg p.o. given      Assessment/Plan   Cystoscopic evaluation for Urinary incontinence, likely stress incontinence    69 y.o. female who presents for the above condition, We had a very long and extensive discussion with the patient regarding the pathophysiology, differential diagnosis, risk factor, management, natural history, incidence and diagnostic work-up of the condition.      Plan:  - Follow up in 6 months        6/26/2024    Scribe Attestation  By signing my name below, ICamille Scribe   attest that this documentation has been prepared under the direction and in the presence of Dr. Perez Wild

## 2024-06-26 NOTE — PROGRESS NOTES
Patient ID: Ariana Wills is a 69 y.o. female.    Cystoscopy    Date/Time: 6/26/2024 12:06 PM    Performed by: Perez Wild MD MPH  Authorized by: Perez Wild MD MPH    Procedure - Bladder Cystoscopy:     Procedure details: cystoscopy    PROCEDURE NOTE:    PREOPERATIVE DIAGNOSIS:  Incontinence     POSTOPERATIVE DIAGNOSIS:  Same    OPERATION:  Flexible Cystourethroscopy      SURGEON:  Perez Wild MD MPH    ANESTHESIA:  2%  lidocaine jelly    COMPLICATIONS:  None    EBL: Minimal      DISPOSITION:  The patient was discharged home after the procedure, per routine.    INDICATIONS: :  Ms. Wills is a 69 y.o. patient with a history of incontinence who presents today for Cystoscopy.     The indications, risks and benefits of this procedure were discussed with the patient, consent was obtained prior to the procedure, and to the best of my judgement the patient seemed to understand and agree to the procedure.    PROCEDURE:  The patient  was brought into the procedure suite and informed consent was reviewed and confirmed. Vital signs were obtained prior to the procedure: LMP  (LMP Unknown) .  The patient was escorted onto the stretcher, placed supine, prepped with betadine and draped in the usual standard surgical fashion.  Intraurethral 2% viscous lidocaine jelly was used for local analgesia.  A 16 Japanese flexible cystourethroscope was inserted into the urethra.       Upon entering the bladder the entire bladder was surveyed in a 360 degree fashion.  The left and right ureteral orifices were in normal orthotopic position effluxing clear yellow urine, bilaterally.   There was no evidence of any bladder lesions, foreign objects, stones or evidence of any mucosal changes. The cystoscope was then retroflexed.  The bladder neck was then further examined without any evidence of lesions. The scope was then removed and in an antegrade fashion, the urethra and bladder were again resurveyed with no evidence of  additional lesions.  The cystoscope was then fully removed.   The patient tolerated the procedure well.  Vitals were stable after the procedure.  The patient was able to void and was discharged home.  Verbal and written Post procedure instructions were reviewed with the patient.    IMPRESSION:  Normal cysto     PLAN:  Fu in 6 months

## 2024-06-27 ENCOUNTER — TELEPHONE (OUTPATIENT)
Dept: NEUROLOGY | Facility: CLINIC | Age: 69
End: 2024-06-27

## 2024-06-27 ENCOUNTER — LAB (OUTPATIENT)
Dept: LAB | Facility: LAB | Age: 69
End: 2024-06-27
Payer: MEDICARE

## 2024-06-27 ENCOUNTER — APPOINTMENT (OUTPATIENT)
Dept: PRIMARY CARE | Facility: CLINIC | Age: 69
End: 2024-06-27
Payer: MEDICARE

## 2024-06-27 DIAGNOSIS — R53.1 WEAKNESS: ICD-10-CM

## 2024-06-27 DIAGNOSIS — R53.1 WEAKNESS: Primary | ICD-10-CM

## 2024-06-27 DIAGNOSIS — F43.21 ADJUSTMENT DISORDER WITH DEPRESSED MOOD: Primary | ICD-10-CM

## 2024-06-27 PROCEDURE — 36415 COLL VENOUS BLD VENIPUNCTURE: CPT

## 2024-06-27 PROCEDURE — 82085 ASSAY OF ALDOLASE: CPT

## 2024-06-27 ASSESSMENT — PATIENT HEALTH QUESTIONNAIRE - PHQ9
6. FEELING BAD ABOUT YOURSELF - OR THAT YOU ARE A FAILURE OR HAVE LET YOURSELF OR YOUR FAMILY DOWN: MORE THAN HALF THE DAYS
2. FEELING DOWN, DEPRESSED OR HOPELESS: NEARLY EVERY DAY
SUM OF ALL RESPONSES TO PHQ9 QUESTIONS 1 & 2: 4
9. THOUGHTS THAT YOU WOULD BE BETTER OFF DEAD, OR OF HURTING YOURSELF: NOT AT ALL
1. LITTLE INTEREST OR PLEASURE IN DOING THINGS: SEVERAL DAYS
4. FEELING TIRED OR HAVING LITTLE ENERGY: MORE THAN HALF THE DAYS
7. TROUBLE CONCENTRATING ON THINGS, SUCH AS READING THE NEWSPAPER OR WATCHING TELEVISION: SEVERAL DAYS
5. POOR APPETITE OR OVEREATING: SEVERAL DAYS
8. MOVING OR SPEAKING SO SLOWLY THAT OTHER PEOPLE COULD HAVE NOTICED. OR THE OPPOSITE, BEING SO FIGETY OR RESTLESS THAT YOU HAVE BEEN MOVING AROUND A LOT MORE THAN USUAL: NOT AT ALL
SUM OF ALL RESPONSES TO PHQ QUESTIONS 1-9: 12
3. TROUBLE FALLING OR STAYING ASLEEP: MORE THAN HALF THE DAYS
10. IF YOU CHECKED OFF ANY PROBLEMS, HOW DIFFICULT HAVE THESE PROBLEMS MADE IT FOR YOU TO DO YOUR WORK, TAKE CARE OF THINGS AT HOME, OR GET ALONG WITH OTHER PEOPLE: SOMEWHAT DIFFICULT

## 2024-06-27 ASSESSMENT — ANXIETY QUESTIONNAIRES
1. FEELING NERVOUS, ANXIOUS, OR ON EDGE: MORE THAN HALF THE DAYS
GAD7 TOTAL SCORE: 9
7. FEELING AFRAID AS IF SOMETHING AWFUL MIGHT HAPPEN: SEVERAL DAYS
6. BECOMING EASILY ANNOYED OR IRRITABLE: MORE THAN HALF THE DAYS
2. NOT BEING ABLE TO STOP OR CONTROL WORRYING: MORE THAN HALF THE DAYS
IF YOU CHECKED OFF ANY PROBLEMS ON THIS QUESTIONNAIRE, HOW DIFFICULT HAVE THESE PROBLEMS MADE IT FOR YOU TO DO YOUR WORK, TAKE CARE OF THINGS AT HOME, OR GET ALONG WITH OTHER PEOPLE: SOMEWHAT DIFFICULT
4. TROUBLE RELAXING: SEVERAL DAYS
3. WORRYING TOO MUCH ABOUT DIFFERENT THINGS: SEVERAL DAYS
5. BEING SO RESTLESS THAT IT IS HARD TO SIT STILL: NOT AT ALL

## 2024-06-27 NOTE — TELEPHONE ENCOUNTER
SONIA: Jm from the lab is calling to report a lab was cancelled due to being lost in transit the lab was the aldolase lab and state we will need to reach out to the pt to inform them and put in a new order.

## 2024-06-27 NOTE — PROGRESS NOTES
Collaborative Care (CoCM)  Progress Note    Type of Interaction: In Office    Start Time: 12:30 PM    End Time: 1:10 PM    Appointment: Scheduled    Reason for Visit:   Chief Complaint   Patient presents with    Follow-up      Interval History / Patient Symptoms:   Subjective   Ariana Wills is a 69 y.o. female who presents for follow up for Collaborative Care services.     Patient Health Questionnaire-9 Score: 12 (6/27/2024  1:15 PM)  WYATT-7 Total Score: 9 (6/27/2024  1:15 PM)     Interventions Provided: Treatment Planning    Progress Made: N/A    Response to Intervention:  We discussed the following elements from the last appointment:   No new changes since initial visit; reviewed consulting psychiatrist recommendations for treatment, including possibility of cymbalta.  Discussed with patient to talk to neurology and pain management prior to starting. Patient would like to try the medication if ok-ed   Also discussed treatment plan for collaborative care appointments. Patient is in agreement with trying CBT techniques to help with anger/frustration, in addition to trying to do positive activities regardless of what others say.  Also talked about working through grief/loss of youth and life stages.      Plan:   Follow up in 2 weeks, will start collaborative care appointments    Follow Up / Next Appointment: Next appointment: 07/10/24

## 2024-06-28 ENCOUNTER — DOCUMENTATION (OUTPATIENT)
Dept: PRIMARY CARE | Facility: CLINIC | Age: 69
End: 2024-06-28
Payer: MEDICARE

## 2024-06-28 DIAGNOSIS — F43.21 ADJUSTMENT DISORDER WITH DEPRESSED MOOD: Primary | ICD-10-CM

## 2024-06-29 DIAGNOSIS — I10 ESSENTIAL HYPERTENSION: ICD-10-CM

## 2024-06-29 DIAGNOSIS — E03.8 OTHER SPECIFIED HYPOTHYROIDISM: ICD-10-CM

## 2024-06-29 LAB — ALDOLASE SERPL-CCNC: 2.9 U/L (ref 1.2–7.6)

## 2024-06-30 RX ORDER — DULOXETIN HYDROCHLORIDE 60 MG/1
60 CAPSULE, DELAYED RELEASE ORAL DAILY
Qty: 30 CAPSULE | Refills: 5 | Status: SHIPPED | OUTPATIENT
Start: 2024-06-30 | End: 2024-12-27

## 2024-06-30 RX ORDER — DULOXETIN HYDROCHLORIDE 30 MG/1
CAPSULE, DELAYED RELEASE ORAL
Qty: 60 CAPSULE | Refills: 0 | Status: SHIPPED | OUTPATIENT
Start: 2024-06-30

## 2024-06-30 RX ORDER — LISINOPRIL 40 MG/1
TABLET ORAL
Qty: 90 TABLET | Refills: 3 | Status: SHIPPED | OUTPATIENT
Start: 2024-06-30

## 2024-06-30 RX ORDER — HYDROCHLOROTHIAZIDE 25 MG/1
TABLET ORAL
Qty: 90 TABLET | Refills: 3 | Status: SHIPPED | OUTPATIENT
Start: 2024-06-30

## 2024-06-30 RX ORDER — ATENOLOL 100 MG/1
50 TABLET ORAL DAILY
Qty: 45 TABLET | Refills: 3 | Status: SHIPPED | OUTPATIENT
Start: 2024-06-30 | End: 2025-06-30

## 2024-06-30 RX ORDER — LEVOTHYROXINE SODIUM 50 UG/1
TABLET ORAL
Qty: 90 TABLET | Refills: 3 | Status: SHIPPED | OUTPATIENT
Start: 2024-06-30

## 2024-06-30 NOTE — TELEPHONE ENCOUNTER
Herlinda, I have prescribed this. Will you please do let her know. Thank you very much.    Dr. Garza

## 2024-07-03 DIAGNOSIS — N39.3 STRESS INCONTINENCE: Primary | ICD-10-CM

## 2024-07-08 RX ORDER — OXYBUTYNIN CHLORIDE 5 MG/1
5 TABLET ORAL 2 TIMES DAILY
Qty: 180 TABLET | Refills: 1 | Status: SHIPPED | OUTPATIENT
Start: 2024-07-08 | End: 2025-01-04

## 2024-07-10 ENCOUNTER — APPOINTMENT (OUTPATIENT)
Dept: PRIMARY CARE | Facility: CLINIC | Age: 69
End: 2024-07-10
Payer: MEDICARE

## 2024-07-10 DIAGNOSIS — F43.21 ADJUSTMENT DISORDER WITH DEPRESSED MOOD: Primary | ICD-10-CM

## 2024-07-10 ASSESSMENT — PATIENT HEALTH QUESTIONNAIRE - PHQ9
1. LITTLE INTEREST OR PLEASURE IN DOING THINGS: SEVERAL DAYS
SUM OF ALL RESPONSES TO PHQ QUESTIONS 1-9: 12
7. TROUBLE CONCENTRATING ON THINGS, SUCH AS READING THE NEWSPAPER OR WATCHING TELEVISION: MORE THAN HALF THE DAYS
2. FEELING DOWN, DEPRESSED OR HOPELESS: SEVERAL DAYS
5. POOR APPETITE OR OVEREATING: MORE THAN HALF THE DAYS
4. FEELING TIRED OR HAVING LITTLE ENERGY: NEARLY EVERY DAY
9. THOUGHTS THAT YOU WOULD BE BETTER OFF DEAD, OR OF HURTING YOURSELF: NOT AT ALL
6. FEELING BAD ABOUT YOURSELF - OR THAT YOU ARE A FAILURE OR HAVE LET YOURSELF OR YOUR FAMILY DOWN: SEVERAL DAYS
SUM OF ALL RESPONSES TO PHQ9 QUESTIONS 1 & 2: 2
8. MOVING OR SPEAKING SO SLOWLY THAT OTHER PEOPLE COULD HAVE NOTICED. OR THE OPPOSITE, BEING SO FIGETY OR RESTLESS THAT YOU HAVE BEEN MOVING AROUND A LOT MORE THAN USUAL: NOT AT ALL
3. TROUBLE FALLING OR STAYING ASLEEP: MORE THAN HALF THE DAYS
10. IF YOU CHECKED OFF ANY PROBLEMS, HOW DIFFICULT HAVE THESE PROBLEMS MADE IT FOR YOU TO DO YOUR WORK, TAKE CARE OF THINGS AT HOME, OR GET ALONG WITH OTHER PEOPLE: SOMEWHAT DIFFICULT

## 2024-07-10 ASSESSMENT — ANXIETY QUESTIONNAIRES
IF YOU CHECKED OFF ANY PROBLEMS ON THIS QUESTIONNAIRE, HOW DIFFICULT HAVE THESE PROBLEMS MADE IT FOR YOU TO DO YOUR WORK, TAKE CARE OF THINGS AT HOME, OR GET ALONG WITH OTHER PEOPLE: SOMEWHAT DIFFICULT
GAD7 TOTAL SCORE: 7
3. WORRYING TOO MUCH ABOUT DIFFERENT THINGS: SEVERAL DAYS
1. FEELING NERVOUS, ANXIOUS, OR ON EDGE: MORE THAN HALF THE DAYS
4. TROUBLE RELAXING: SEVERAL DAYS
6. BECOMING EASILY ANNOYED OR IRRITABLE: SEVERAL DAYS
2. NOT BEING ABLE TO STOP OR CONTROL WORRYING: MORE THAN HALF THE DAYS
7. FEELING AFRAID AS IF SOMETHING AWFUL MIGHT HAPPEN: NOT AT ALL
5. BEING SO RESTLESS THAT IT IS HARD TO SIT STILL: NOT AT ALL

## 2024-07-10 NOTE — PROGRESS NOTES
"Collaborative Care (CoCM)  Progress Note    Type of Interaction: In Office    Start Time: 1:00 PM    End Time: 2:00 PM    Appointment: Scheduled    Reason for Visit:   Chief Complaint   Patient presents with    Follow-up      Interval History / Patient Symptoms:   Subjective   Ariana Wills is a 69 y.o. female who presents for follow up for Collaborative Care services.     Patient Health Questionnaire-9 Score: 12 (7/10/2024  1:06 PM)  WYATT-7 Total Score: 7 (7/10/2024  1:05 PM)     Interventions Provided: CBT, boundaries    Progress Made: Slight    Response to Intervention:  We discussed the following elements from the last appointment:   Cymbalta started- has been taking for about a week; last few days very tired, wakes, eats, wants to nap.  Sleep issues at night are more from pain than medication. Taking cymbalta at night.  Did identify that when stressed, she takes a nap.  Current stress over family matters  Discussed self talk; identified boundaries that have limited her from saying \"I can't do this\" both for good and bad.  Talked about setting boundaries when family comes and wants her to 'fix' a situation. Pt feels bad when she can't fix a situation, but also doesn't want to say no when asked to do something, even if out of her comfort level/ability.    Discussed how routines are easy to get into, but it feels uncomfortable to change routines- like folding hands- had her fold hands the 'normal' way, then switched.  Identified this discomfort with that of setting boundaries.    Plan:   Pt going to work on setting boundaries and remember that it might feel uncomfortable  Would like to get involved with some type of art activity such as pottery-    Follow Up / Next Appointment: Next appointment: 07/24/24   "

## 2024-07-16 ENCOUNTER — HOSPITAL ENCOUNTER (OUTPATIENT)
Dept: NEUROLOGY | Facility: CLINIC | Age: 69
Discharge: HOME | End: 2024-07-16
Payer: MEDICARE

## 2024-07-16 DIAGNOSIS — R53.1 WEAKNESS: ICD-10-CM

## 2024-07-16 DIAGNOSIS — G62.9 NEUROPATHY: ICD-10-CM

## 2024-07-16 PROCEDURE — 95885 MUSC TST DONE W/NERV TST LIM: CPT | Performed by: STUDENT IN AN ORGANIZED HEALTH CARE EDUCATION/TRAINING PROGRAM

## 2024-07-16 PROCEDURE — 95911 NRV CNDJ TEST 9-10 STUDIES: CPT | Performed by: STUDENT IN AN ORGANIZED HEALTH CARE EDUCATION/TRAINING PROGRAM

## 2024-07-16 PROCEDURE — 95886 MUSC TEST DONE W/N TEST COMP: CPT | Performed by: STUDENT IN AN ORGANIZED HEALTH CARE EDUCATION/TRAINING PROGRAM

## 2024-07-16 PROCEDURE — 95885 MUSC TST DONE W/NERV TST LIM: CPT | Mod: 59 | Performed by: STUDENT IN AN ORGANIZED HEALTH CARE EDUCATION/TRAINING PROGRAM

## 2024-07-19 ENCOUNTER — TELEPHONE (OUTPATIENT)
Dept: NEUROLOGY | Facility: CLINIC | Age: 69
End: 2024-07-19
Payer: MEDICARE

## 2024-07-19 DIAGNOSIS — R76.8 POSITIVE ANA (ANTINUCLEAR ANTIBODY): Primary | ICD-10-CM

## 2024-07-19 NOTE — TELEPHONE ENCOUNTER
I spoke to the patient and reviewed her EMG/NCV.    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    Recommend evaluation by Orthopedic Surgery for carpal tunnel syndrome and ulnar neuropathy.  Patient has an orthopedic surgeon already and will schedule a follow up appointment.

## 2024-07-24 ENCOUNTER — APPOINTMENT (OUTPATIENT)
Dept: PRIMARY CARE | Facility: CLINIC | Age: 69
End: 2024-07-24
Payer: MEDICARE

## 2024-07-24 DIAGNOSIS — F43.21 ADJUSTMENT DISORDER WITH DEPRESSED MOOD: Primary | ICD-10-CM

## 2024-07-24 ASSESSMENT — PATIENT HEALTH QUESTIONNAIRE - PHQ9
8. MOVING OR SPEAKING SO SLOWLY THAT OTHER PEOPLE COULD HAVE NOTICED. OR THE OPPOSITE, BEING SO FIGETY OR RESTLESS THAT YOU HAVE BEEN MOVING AROUND A LOT MORE THAN USUAL: NOT AT ALL
7. TROUBLE CONCENTRATING ON THINGS, SUCH AS READING THE NEWSPAPER OR WATCHING TELEVISION: NOT AT ALL
9. THOUGHTS THAT YOU WOULD BE BETTER OFF DEAD, OR OF HURTING YOURSELF: NOT AT ALL
1. LITTLE INTEREST OR PLEASURE IN DOING THINGS: SEVERAL DAYS
SUM OF ALL RESPONSES TO PHQ9 QUESTIONS 1 & 2: 1
6. FEELING BAD ABOUT YOURSELF - OR THAT YOU ARE A FAILURE OR HAVE LET YOURSELF OR YOUR FAMILY DOWN: NOT AT ALL
2. FEELING DOWN, DEPRESSED OR HOPELESS: NOT AT ALL
3. TROUBLE FALLING OR STAYING ASLEEP: NOT AT ALL
SUM OF ALL RESPONSES TO PHQ QUESTIONS 1-9: 3
10. IF YOU CHECKED OFF ANY PROBLEMS, HOW DIFFICULT HAVE THESE PROBLEMS MADE IT FOR YOU TO DO YOUR WORK, TAKE CARE OF THINGS AT HOME, OR GET ALONG WITH OTHER PEOPLE: SOMEWHAT DIFFICULT
4. FEELING TIRED OR HAVING LITTLE ENERGY: MORE THAN HALF THE DAYS
5. POOR APPETITE OR OVEREATING: NOT AT ALL

## 2024-07-24 ASSESSMENT — ANXIETY QUESTIONNAIRES
GAD7 TOTAL SCORE: 3
3. WORRYING TOO MUCH ABOUT DIFFERENT THINGS: NOT AT ALL
1. FEELING NERVOUS, ANXIOUS, OR ON EDGE: SEVERAL DAYS
5. BEING SO RESTLESS THAT IT IS HARD TO SIT STILL: NOT AT ALL
4. TROUBLE RELAXING: SEVERAL DAYS
2. NOT BEING ABLE TO STOP OR CONTROL WORRYING: SEVERAL DAYS
7. FEELING AFRAID AS IF SOMETHING AWFUL MIGHT HAPPEN: NOT AT ALL
IF YOU CHECKED OFF ANY PROBLEMS ON THIS QUESTIONNAIRE, HOW DIFFICULT HAVE THESE PROBLEMS MADE IT FOR YOU TO DO YOUR WORK, TAKE CARE OF THINGS AT HOME, OR GET ALONG WITH OTHER PEOPLE: NOT DIFFICULT AT ALL
6. BECOMING EASILY ANNOYED OR IRRITABLE: NOT AT ALL

## 2024-07-24 NOTE — PROGRESS NOTES
Collaborative Care (CoCM)  Progress Note    Type of Interaction: In Office    Start Time: 2:00 PM    End Time: 2:58 PM    Appointment: Scheduled    Reason for Visit:   Chief Complaint   Patient presents with    Follow-up      Interval History / Patient Symptoms:   Subjective   Ariana Wills is a 69 y.o. female who presents for follow up for Collaborative Care services.     Patient Health Questionnaire-9 Score: 3 (7/24/2024  2:59 PM)  WYATT-7 Total Score: 3 (7/24/2024  2:59 PM)  Scores have decreased since last appointment    Interventions Provided: [place] Setting; CBT    Progress Made: Moderate    Response to Intervention:  We discussed the following elements from the last appointment:   Cymbalta- not quite as snippy at  and not as teary, been implementing/able to implement boundaries that she hasn't prior.  Discussed different ways of disrupting negative thoughts- including color mindfulness, deep relaxation using guided meditation or progressive muscle relaxation, and visual distraction- utilizing crafting, video games, screen time and identified ideal times to utilize these strategies, particularly progressive muscle relaxation and/or guided meditation when she is experiencing more pain.    Plan:   Continue to work on boundaries- setting and verbalizing, practice using guided visualization and progressive muscle relaxation, and color mindfulness.    Follow Up / Next Appointment: Next appointment: 08/07/24

## 2024-07-25 DIAGNOSIS — F33.2 SEVERE EPISODE OF RECURRENT MAJOR DEPRESSIVE DISORDER, WITHOUT PSYCHOTIC FEATURES (MULTI): ICD-10-CM

## 2024-07-25 RX ORDER — DULOXETIN HYDROCHLORIDE 60 MG/1
60 CAPSULE, DELAYED RELEASE ORAL DAILY
Qty: 90 CAPSULE | Refills: 1 | Status: SHIPPED | OUTPATIENT
Start: 2024-07-25 | End: 2025-07-25

## 2024-07-31 ENCOUNTER — DOCUMENTATION (OUTPATIENT)
Dept: PRIMARY CARE | Facility: CLINIC | Age: 69
End: 2024-07-31

## 2024-07-31 ENCOUNTER — APPOINTMENT (OUTPATIENT)
Dept: RHEUMATOLOGY | Facility: CLINIC | Age: 69
End: 2024-07-31
Payer: MEDICARE

## 2024-07-31 DIAGNOSIS — F43.21 ADJUSTMENT DISORDER WITH DEPRESSED MOOD: Primary | ICD-10-CM

## 2024-07-31 PROCEDURE — 99493 SBSQ PSYC COLLAB CARE MGMT: CPT | Performed by: INTERNAL MEDICINE

## 2024-07-31 PROCEDURE — 99494 1ST/SBSQ PSYC COLLAB CARE: CPT | Performed by: INTERNAL MEDICINE

## 2024-08-07 ENCOUNTER — APPOINTMENT (OUTPATIENT)
Dept: PRIMARY CARE | Facility: CLINIC | Age: 69
End: 2024-08-07
Payer: MEDICARE

## 2024-08-07 DIAGNOSIS — G89.29 OTHER CHRONIC PAIN: ICD-10-CM

## 2024-08-07 DIAGNOSIS — M54.50 LOW BACK PAIN, UNSPECIFIED: ICD-10-CM

## 2024-08-07 DIAGNOSIS — F43.21 ADJUSTMENT DISORDER WITH DEPRESSED MOOD: Primary | ICD-10-CM

## 2024-08-07 ASSESSMENT — ANXIETY QUESTIONNAIRES
6. BECOMING EASILY ANNOYED OR IRRITABLE: SEVERAL DAYS
4. TROUBLE RELAXING: SEVERAL DAYS
IF YOU CHECKED OFF ANY PROBLEMS ON THIS QUESTIONNAIRE, HOW DIFFICULT HAVE THESE PROBLEMS MADE IT FOR YOU TO DO YOUR WORK, TAKE CARE OF THINGS AT HOME, OR GET ALONG WITH OTHER PEOPLE: SOMEWHAT DIFFICULT
2. NOT BEING ABLE TO STOP OR CONTROL WORRYING: MORE THAN HALF THE DAYS
3. WORRYING TOO MUCH ABOUT DIFFERENT THINGS: SEVERAL DAYS
7. FEELING AFRAID AS IF SOMETHING AWFUL MIGHT HAPPEN: NOT AT ALL
5. BEING SO RESTLESS THAT IT IS HARD TO SIT STILL: NOT AT ALL
1. FEELING NERVOUS, ANXIOUS, OR ON EDGE: SEVERAL DAYS
GAD7 TOTAL SCORE: 6

## 2024-08-07 ASSESSMENT — PATIENT HEALTH QUESTIONNAIRE - PHQ9
2. FEELING DOWN, DEPRESSED OR HOPELESS: MORE THAN HALF THE DAYS
9. THOUGHTS THAT YOU WOULD BE BETTER OFF DEAD, OR OF HURTING YOURSELF: NOT AT ALL
5. POOR APPETITE OR OVEREATING: SEVERAL DAYS
4. FEELING TIRED OR HAVING LITTLE ENERGY: MORE THAN HALF THE DAYS
6. FEELING BAD ABOUT YOURSELF - OR THAT YOU ARE A FAILURE OR HAVE LET YOURSELF OR YOUR FAMILY DOWN: SEVERAL DAYS
7. TROUBLE CONCENTRATING ON THINGS, SUCH AS READING THE NEWSPAPER OR WATCHING TELEVISION: SEVERAL DAYS
10. IF YOU CHECKED OFF ANY PROBLEMS, HOW DIFFICULT HAVE THESE PROBLEMS MADE IT FOR YOU TO DO YOUR WORK, TAKE CARE OF THINGS AT HOME, OR GET ALONG WITH OTHER PEOPLE: SOMEWHAT DIFFICULT
8. MOVING OR SPEAKING SO SLOWLY THAT OTHER PEOPLE COULD HAVE NOTICED. OR THE OPPOSITE, BEING SO FIGETY OR RESTLESS THAT YOU HAVE BEEN MOVING AROUND A LOT MORE THAN USUAL: NOT AT ALL
3. TROUBLE FALLING OR STAYING ASLEEP: MORE THAN HALF THE DAYS
SUM OF ALL RESPONSES TO PHQ9 QUESTIONS 1 & 2: 3
1. LITTLE INTEREST OR PLEASURE IN DOING THINGS: SEVERAL DAYS
SUM OF ALL RESPONSES TO PHQ QUESTIONS 1-9: 10

## 2024-08-07 NOTE — PROGRESS NOTES
"Collaborative Care (CoCM)  Progress Note    Type of Interaction: In Office    Start Time: 1:05 PM    End Time: 2:00 PM    Appointment: Scheduled    Reason for Visit:   Chief Complaint   Patient presents with    Follow-up      Interval History / Patient Symptoms:   Subjective   Ariana Wills is a 69 y.o. female who presents for follow up for Collaborative Care services.     Patient Health Questionnaire-9 Score: 10 (8/7/2024  1:06 PM)  WYATT-7 Total Score: 6 (8/7/2024  1:06 PM)     Interventions Provided: CBT, [place] setting    Progress Made: Slight    Response to Intervention:  We discussed the following elements from the last appointment:   Increased stressors since last appointment;  got COVID; he took paxlovid and since then has been more verbally combative towards her- telling her to \"go to hell\" and 'shut up'.  Patient shared how she has been managing the situation, walking away and trying to not personalize his words.  Patient shared that it is hard for her to share these feelings/thoughts with someone because she is used to pushing it down.  Encouraged patient to continue to use this time to 'vent' and problem solve.  For coping tools, patient stated that she tried conscious relaxation; she uses white noise of a fan for sleep, which is soothing, will fall asleep to an audible book.  Hasn't listened to sleepy paws yet.    Finds dog sitting her daughter's dog enjoyable and will be going to watch the dog this weekend, giving time/space away from her .   Pt states that most of the time, can use boundaries with , difficult when in the car but usually can walk away.      Plan:   Trying to remove self; discussed ear plugs when in situations where she cannot move away from her .  Continue to set and use boundaries as a way to manage emotions    Follow Up / Next Appointment: Next appointment: 08/21/24   "

## 2024-08-08 RX ORDER — GABAPENTIN 300 MG/1
300 CAPSULE ORAL 2 TIMES DAILY
Qty: 60 CAPSULE | Refills: 3 | Status: SHIPPED | OUTPATIENT
Start: 2024-08-08

## 2024-08-09 ENCOUNTER — APPOINTMENT (OUTPATIENT)
Dept: RHEUMATOLOGY | Facility: CLINIC | Age: 69
End: 2024-08-09
Payer: MEDICARE

## 2024-08-09 DIAGNOSIS — G62.89 OTHER POLYNEUROPATHY: Primary | ICD-10-CM

## 2024-08-09 DIAGNOSIS — R76.8 POSITIVE ANA (ANTINUCLEAR ANTIBODY): ICD-10-CM

## 2024-08-09 PROBLEM — G62.9 PERIPHERAL NEUROPATHY: Status: ACTIVE | Noted: 2024-08-09

## 2024-08-09 PROCEDURE — 99214 OFFICE O/P EST MOD 30 MIN: CPT | Performed by: INTERNAL MEDICINE

## 2024-08-09 PROCEDURE — 1159F MED LIST DOCD IN RCRD: CPT | Performed by: INTERNAL MEDICINE

## 2024-08-09 PROCEDURE — 1036F TOBACCO NON-USER: CPT | Performed by: INTERNAL MEDICINE

## 2024-08-09 PROCEDURE — 1160F RVW MEDS BY RX/DR IN RCRD: CPT | Performed by: INTERNAL MEDICINE

## 2024-08-09 PROCEDURE — 1123F ACP DISCUSS/DSCN MKR DOCD: CPT | Performed by: INTERNAL MEDICINE

## 2024-08-09 NOTE — PROGRESS NOTES
Virtual or Telephone Consent    An interactive audio and video telecommunication system which permits real time communications between the patient (at the originating site) and provider (at the distant site) was utilized to provide this telehealth service.   Verbal consent was requested and obtained from Ariana Wills on this date, 08/09/24 for a telehealth visit.      Subjective   Patient ID: [id number]   Ariana Wills is a 69 y.o. female who presents for No chief complaint on file..  HPI  New visit for EUNICE positive test  Performed for long standing peripheral neuropathy  Recently evaluated by neurology colleagues  She states that over 20 years ago, she developed numbness in hands and feet. She was diagnosed with neuropathy. Over the years, she notes that the numbness has gotten worse. Currently, the numbness is mainly in the toes in lower extremities. She feels weakness in her thighs and calves. She notes difficulty going up and down stairs. Has noted the neuropathy symptoms are slowly progressive, but no motor weakness as such  Feels a bit weak on the right hand, she is right handed, occasionally dropping things from the right hand  No wrist or foot drop  Denies any swelling of the knuckles  Denies morning stiffness  Has chronic allergies, and long standing allergic rhinitis and occasional sinusitis requiring antibiotic use which has not worsened  Denies any rash over the extremities  Denies hematuria, frothy urine, no new shortness of breath or hemoptysis  Denies photosensitivity- has Rosoacea over the nose  Mild sicca symptoms from being on 3 different meds with anti cholinergic effects  Denies raynauds, myalgias,  NO family history of autoimmune disease  No family history of hereditary neuropathies    ROS  Constitutional: Denies fever, chills, weight loss, night sweats or headaches  Eyes: Denies dry eyes, blurry vision, redness or pain or photophobia  ENT: Denies dry mouth, dental loss, loss of taste,  nasal or oral ulcers, jaw claudication, difficulty swallowing, nasal crusting or recurrent sinus infections   Cardiovascular: Denies chest pain, palpitations, orthopnea  Respiratory: Denies shortness of breath, cough, asthma, or recurrent respiratory infections  Gastrointestinal: Denies dysphagia, nausea, vomiting, heartburn, abdominal pain, constipation, diarrhea, melena or hematochezia  Genitourinary: No recurrent urinary infections or STDs, no genital or anal ulcers.  Integumentary: Denies photosensitivity, rash or lesions, Raynaud's phenomenon, skin tightening, digital ulcers, psoriatic lesions, or alopecia  Neurological: Denies any numbness or tingling, muscle weakness, or incontinence   Hematologic/Lymphatic: Denies bleeding, bruising, history of clots (arterial or venous), or abortions/miscarriages/pregnancy complications   MSK: No joint pains, redness, hotness or swelling. No inflammatory back pain, enthesitis, dactylitis. No morning stiffness   Muscular: Denies weakness, difficulty rising from chair or combing the hair, muscle aches, or problems with hand    FHx: No family history of autoimmune diseases       Patient Active Problem List   Diagnosis    Acute sinusitis    Allergic rhinitis    Asthma ([facility])    Sleep apnea    Blurred vision, bilateral    BPV (benign positional vertigo)    Chronic low back pain    Chronic venous insufficiency    CKD (chronic kidney disease) stage 3, GFR 30-59 ml/min (Multi)    Depression    Essential hypertension    Hypokalemia    Hypothyroidism    Insomnia    Itchy eyes    Left knee pain    Lesion of right eyelid    Morbid obesity (Multi)    Nuclear sclerosis of both eyes    Overactive bladder    Refractive error    Wears eyeglasses    Hearing loss    Impacted cerumen of right ear    Laryngopharyngeal reflux (LPR)    Localized edema    Lumbar spinal stenosis    Lipedema    Lymphedema of both lower extremities    Osteoarthritis of right shoulder    Primary osteoarthritis,  right hand    Weakness of both lower extremities    Subjective tinnitus of both ears    Sensorineural hearing loss (SNHL) of right ear with unrestricted hearing of left ear    Pain in both lower extremities    Lumbar spondylosis    Weakness of trunk musculature    Primary osteoarthritis of both knees    Benign neoplasm of sigmoid colon    Diverticulosis of large intestine    Hemorrhoids    History of colonic polyps    Numbness and tingling sensation of skin    Pain of lower extremity    Rosacea    Adjustment disorder with depressed mood        Past Medical History:   Diagnosis Date    Abnormal weight gain 05/04/2016    Weight gain    Candidiasis, unspecified 05/22/2015    Yeast infection    Cellulitis, unspecified 09/07/2014    Cellulitis    Encounter for screening for malignant neoplasm of cervix 05/22/2015    Pap smear for cervical cancer screening    Encounter for screening mammogram for malignant neoplasm of breast 05/22/2015    Visit for screening mammogram    Encounter for screening mammogram for malignant neoplasm of breast 05/22/2015    Visit for screening mammogram    Furuncle of buttock 09/18/2019    Boil, buttock    GERD (gastroesophageal reflux disease)     Hereditary and idiopathic neuropathy, unspecified 09/29/2013    Idiopathic peripheral neuropathy    Hypertension     Hypothyroid     Lymphedema, not elsewhere classified 05/22/2015    Lymphedema    Other symptoms and signs involving the musculoskeletal system 05/03/2016    Complaints of leg weakness    Pain in left hip 05/03/2016    Hip pain, acute, left    Personal history of other diseases of the nervous system and sense organs     History of peripheral neuropathy    Personal history of other diseases of the respiratory system 04/10/2015    History of acute bronchitis    Personal history of other diseases of the respiratory system 06/22/2015    History of sinusitis    Personal history of other specified conditions 06/28/2016    History of dizziness     Personal history of other specified conditions 05/22/2015    History of chest pain    Sleep apnea     Unspecified asthma with (acute) exacerbation ([facility]) 05/03/2016    Asthma exacerbation    Weakness of trunk musculature 11/01/2023        Past Surgical History:   Procedure Laterality Date    CARDIAC CATHETERIZATION  04/03/2023    CARPAL TUNNEL RELEASE  09/27/2013    Neuroplasty Decompression Median Nerve At Carpal Tunnel    CHOLECYSTECTOMY  09/27/2013    Cholecystectomy    COLONOSCOPY      DILATION AND CURETTAGE OF UTERUS          Social History     Socioeconomic History    Marital status:      Spouse name: Not on file    Number of children: Not on file    Years of education: Not on file    Highest education level: Not on file   Occupational History    Not on file   Tobacco Use    Smoking status: Never    Smokeless tobacco: Never   Vaping Use    Vaping status: Never Used   Substance and Sexual Activity    Alcohol use: Yes     Comment: 4-5 a year    Drug use: Never    Sexual activity: Defer   Other Topics Concern    Not on file   Social History Narrative    Not on file     Social Determinants of Health     Financial Resource Strain: Not on file   Food Insecurity: Not on file   Transportation Needs: Not on file   Physical Activity: Not on file   Stress: Not on file   Social Connections: Not on file   Intimate Partner Violence: Not on file   Housing Stability: Not on file        No Known Allergies       Current Outpatient Medications:     acetaminophen (Tylenol) 500 mg tablet, Take by mouth every 6 hours if needed., Disp: , Rfl:     albuterol 90 mcg/actuation inhaler, Inhale., Disp: , Rfl:     atenolol (Tenormin) 100 mg tablet, Take 0.5 tablets (50 mg) by mouth once daily., Disp: 45 tablet, Rfl: 3    azelastine (Astelin) 137 mcg (0.1 %) nasal spray, Administer 2 sprays into each nostril 2 times a day., Disp: , Rfl:     cholecalciferol (Vitamin D-3) 5,000 Units tablet, Take 1 tablet (5,000 Units) by mouth  once daily., Disp: , Rfl:     DULoxetine (Cymbalta) 60 mg DR capsule, Take 1 capsule (60 mg) by mouth once daily. Once done with 30mg rx. Do not crush or chew., Disp: 30 capsule, Rfl: 5    DULoxetine (Cymbalta) 60 mg DR capsule, Take 1 capsule (60 mg) by mouth once daily., Disp: 90 capsule, Rfl: 1    famotidine (Pepcid) 20 mg tablet, TAKE 1 TABLET BY MOUTH EVERYDAY AT BEDTIME, Disp: 90 tablet, Rfl: 1    flunisolide (Nasalide) 25 mcg (0.025 %) spray,non-aerosol, Administer into affected nostril(s)., Disp: , Rfl:     gabapentin (Neurontin) 300 mg capsule, TAKE 1 CAPSULE BY MOUTH TWICE A DAY, Disp: 60 capsule, Rfl: 3    glucosamine HCl-msm-chondroitn 750-375-400 mg tablet, Take by mouth., Disp: , Rfl:     hydroCHLOROthiazide (HYDRODiuril) 25 mg tablet, TAKE 1 TABLET DAILY, Disp: 90 tablet, Rfl: 3    levothyroxine (Synthroid, Levoxyl) 50 mcg tablet, TAKE 1 TABLET DAILY, Disp: 90 tablet, Rfl: 3    lisinopril 40 mg tablet, TAKE 1 TABLET DAILY, Disp: 90 tablet, Rfl: 3    multivitamin capsule, Take by mouth., Disp: , Rfl:     oxybutynin (Ditropan) 5 mg tablet, Take 1 tablet (5 mg) by mouth 2 times a day., Disp: 180 tablet, Rfl: 1    rosuvastatin (Crestor) 10 mg tablet, Take 1 tablet (10 mg) by mouth once daily at bedtime., Disp: , Rfl:     Current Facility-Administered Medications:     ciprofloxacin (Cipro) tablet 500 mg, 500 mg, oral, Once, Perez Wild MD MPH       Objective     There were no vitals taken for this visit.     Physical Exam not performed virtual visit       Lab Results   Component Value Date    WBC 8.2 05/30/2023    HGB 11.9 (L) 05/30/2024    HCT 36.9 05/30/2023    MCV 94 05/30/2023     05/30/2023        Chemistry    Lab Results   Component Value Date/Time     05/30/2024 1102    K 4.3 05/30/2024 1102     05/30/2024 1102    CO2 29 05/30/2024 1102    BUN 16 05/30/2024 1102    CREATININE 1.07 (H) 05/30/2024 1102    Lab Results   Component Value Date/Time    CALCIUM 9.6 05/30/2024 1102  "   ALKPHOS 120 05/30/2024 1102    AST 19 05/30/2024 1102    ALT 11 05/30/2024 1102    BILITOT 0.8 05/30/2024 1102           No results found for: \"CRP\"   Lab Results   Component Value Date    EUNICE Positive (A) 06/17/2024    SEDRATE 24 06/17/2024      Lab Results   Component Value Date    CKTOTAL 38 06/17/2024     No results found for: \"NEUTROABS\"   Lab Results   Component Value Date    FERRITIN 159 (H) 05/30/2024      No results found for: \"HEPATOT\", \"HEPAIGM\", \"HEPBCIGM\", \"HEPBCAB\", \"HBEAG\", \"HEPCAB\"   Lab Results   Component Value Date    ALT 11 05/30/2024    AST 19 05/30/2024    ALKPHOS 120 05/30/2024    BILITOT 0.8 05/30/2024      No results found for: \"PPD\"   No results found for: \"URICACID\"   Lab Results   Component Value Date    CALCIUM 9.6 05/30/2024      Lab Results   Component Value Date    SPEP Normal. 06/17/2024      No results found for: \"ALBUR\", \"[id number]\"   .last          EMG & nerve conduction  Impression:    This is an abnormal study. There is electrophysiologic evidence consistent with a moderately severe, left median neuropathy across the wrist. These electrophysiologic findings are consistent with a clinical diagnosis of carpal tunnel syndrome. In   addition, there is electrophysiologic evidence of a moderate left ulnar neuropathy across the wrist. The pathophysiology is predominantly demyelinating with secondary axonal loss. Lastly, there are findings suggestive of a superimposed, generalized,   axonal, motor and sensory peripheral polyneuropathy.    Please note: with the absence of needle EMG (see above) in the lower extremity a lumbosacral radiculopathy cannot be excluded.    Dariel Maria, " DO    --------------------------------------------------------------------------------------------------------------------------------------------------------------------------------------------------------------------------------------------------------------  --------------------------------------------------------------------------------------------------------------------------------------------------------------------------------------------------------------------------------------------------------------  -------------------------------------------------------------------------------------------------------------------------------------------------------------------------------------------------------------------     === 12/14/23 ===    XR KNEE LEFT 4+ VIEWS    - Impression -  Advanced osteoarthritis bilateral knees worst in the medial  compartments.    Signed by: Dusty Galvan 12/15/2023 3:08 PM  Dictation workstation:   [id number]   === 06/13/24 ===    MR LUMBAR SPINE WO CONTRAST    - Impression -  1. Progression of multilevel degenerative changes of the lumbar  spine, most notably at L5-S1.  2. Slight progression of grade 1 anterolisthesis of L3 on L4 and L4  on L5.      I personally reviewed the images/study and I agree with the findings  as stated by radiology resident Dr. Barber. This study was  interpreted at [facility],  [place], [place].    MACRO:  None    Signed by: Luis Nicole 6/14/2024 2:21 PM  Dictation workstation:   [id number]   === 11/23/20 ===    - Impression -  The BMD measured at Femur Neck is 1.032 g/cm2 with a T-score of 0.0.  This patient is considered normal according to World Health  Organization (WHO) criteria.    With a Z-score of 1.4, this patient's BMD is slightly higher than the  normal limits for their age and sex.  .  Bone mineral density in the lumbar spine may be falsely elevated  secondary to discogenic degenerative disease and degenerative  facet  sclerosis.    Follow-up DEXA and treatment is recommended as clinically indicated.  .  All images and detailed analysis are available on the  Radiology  PACS.*    *For patients with comparison exams obtained from [facility] prior  to December 2006, measurements presented in this report have been  modified to reflect more accurate trend analysis when compared to  current data obtained on the [facility].       Assessment/Plan   Diagnoses and all orders for this visit:  Other polyneuropathy  Positive EUNICE (antinuclear antibody)  -     Referral to Rheumatology  Chronic idiopathic sensory  predominant peripheral neuropathy    Low titre EUNICE with negative SERVANDO is of no clinical relevance ( false positive)  Given the symptoms spanning over decades, without any other manifestations of organ involvement of an autoimmune nature makes it highly unlikely that this is a systemic autoimmune disease  Therefore, I am not ordering any further tests to look for things like vasculitides given the long standing, indolent, chronic nature of her sensory predominant peripheral neuropathy  Follow up with neurology    Severe OA of bilateral knees -  Grade 4   Follows with orthopedics, BMI > 40, not a candidate for surgery    RTC PRN with rheumatology       Greg Green MD   Plan, including risks and benefits, was discussed with the patient, informed on how to reach us.     To schedule an appointment, call  [phone] [place] or call [phone] for [facility]

## 2024-08-21 ENCOUNTER — APPOINTMENT (OUTPATIENT)
Dept: PRIMARY CARE | Facility: CLINIC | Age: 69
End: 2024-08-21
Payer: MEDICARE

## 2024-08-21 DIAGNOSIS — F43.21 ADJUSTMENT DISORDER WITH DEPRESSED MOOD: Primary | ICD-10-CM

## 2024-08-21 ASSESSMENT — PATIENT HEALTH QUESTIONNAIRE - PHQ9
5. POOR APPETITE OR OVEREATING: NOT AT ALL
2. FEELING DOWN, DEPRESSED OR HOPELESS: NOT AT ALL
9. THOUGHTS THAT YOU WOULD BE BETTER OFF DEAD, OR OF HURTING YOURSELF: NOT AT ALL
8. MOVING OR SPEAKING SO SLOWLY THAT OTHER PEOPLE COULD HAVE NOTICED. OR THE OPPOSITE, BEING SO FIGETY OR RESTLESS THAT YOU HAVE BEEN MOVING AROUND A LOT MORE THAN USUAL: NOT AT ALL
6. FEELING BAD ABOUT YOURSELF - OR THAT YOU ARE A FAILURE OR HAVE LET YOURSELF OR YOUR FAMILY DOWN: NOT AT ALL
7. TROUBLE CONCENTRATING ON THINGS, SUCH AS READING THE NEWSPAPER OR WATCHING TELEVISION: NOT AT ALL
1. LITTLE INTEREST OR PLEASURE IN DOING THINGS: SEVERAL DAYS
SUM OF ALL RESPONSES TO PHQ9 QUESTIONS 1 & 2: 1
3. TROUBLE FALLING OR STAYING ASLEEP: SEVERAL DAYS
SUM OF ALL RESPONSES TO PHQ QUESTIONS 1-9: 3
4. FEELING TIRED OR HAVING LITTLE ENERGY: SEVERAL DAYS
10. IF YOU CHECKED OFF ANY PROBLEMS, HOW DIFFICULT HAVE THESE PROBLEMS MADE IT FOR YOU TO DO YOUR WORK, TAKE CARE OF THINGS AT HOME, OR GET ALONG WITH OTHER PEOPLE: NOT DIFFICULT AT ALL

## 2024-08-21 ASSESSMENT — ANXIETY QUESTIONNAIRES
3. WORRYING TOO MUCH ABOUT DIFFERENT THINGS: NOT AT ALL
GAD7 TOTAL SCORE: 1
4. TROUBLE RELAXING: NOT AT ALL
2. NOT BEING ABLE TO STOP OR CONTROL WORRYING: NOT AT ALL
7. FEELING AFRAID AS IF SOMETHING AWFUL MIGHT HAPPEN: NOT AT ALL
1. FEELING NERVOUS, ANXIOUS, OR ON EDGE: NOT AT ALL
5. BEING SO RESTLESS THAT IT IS HARD TO SIT STILL: NOT AT ALL
6. BECOMING EASILY ANNOYED OR IRRITABLE: SEVERAL DAYS

## 2024-08-22 ENCOUNTER — APPOINTMENT (OUTPATIENT)
Dept: PHYSICAL MEDICINE AND REHAB | Facility: CLINIC | Age: 69
End: 2024-08-22
Payer: MEDICARE

## 2024-08-22 VITALS — RESPIRATION RATE: 20 BRPM | SYSTOLIC BLOOD PRESSURE: 108 MMHG | DIASTOLIC BLOOD PRESSURE: 70 MMHG | HEART RATE: 78 BPM

## 2024-08-22 DIAGNOSIS — M47.816 LUMBAR SPONDYLOSIS: ICD-10-CM

## 2024-08-22 DIAGNOSIS — R20.0 NUMBNESS AND TINGLING SENSATION OF SKIN: ICD-10-CM

## 2024-08-22 DIAGNOSIS — R60.9 LIPEDEMA: ICD-10-CM

## 2024-08-22 DIAGNOSIS — M25.561 CHRONIC PAIN OF BOTH KNEES: ICD-10-CM

## 2024-08-22 DIAGNOSIS — M25.562 CHRONIC PAIN OF BOTH KNEES: ICD-10-CM

## 2024-08-22 DIAGNOSIS — M17.0 PRIMARY OSTEOARTHRITIS OF BOTH KNEES: Primary | ICD-10-CM

## 2024-08-22 DIAGNOSIS — I89.0 LYMPHEDEMA: ICD-10-CM

## 2024-08-22 DIAGNOSIS — G89.29 CHRONIC PAIN OF BOTH KNEES: ICD-10-CM

## 2024-08-22 DIAGNOSIS — M48.061 SPINAL STENOSIS OF LUMBAR REGION WITHOUT NEUROGENIC CLAUDICATION: ICD-10-CM

## 2024-08-22 DIAGNOSIS — R20.2 NUMBNESS AND TINGLING SENSATION OF SKIN: ICD-10-CM

## 2024-08-22 PROCEDURE — 99214 OFFICE O/P EST MOD 30 MIN: CPT | Performed by: PHYSICAL MEDICINE & REHABILITATION

## 2024-08-22 PROCEDURE — 1125F AMNT PAIN NOTED PAIN PRSNT: CPT | Performed by: PHYSICAL MEDICINE & REHABILITATION

## 2024-08-22 PROCEDURE — 3074F SYST BP LT 130 MM HG: CPT | Performed by: PHYSICAL MEDICINE & REHABILITATION

## 2024-08-22 PROCEDURE — 1036F TOBACCO NON-USER: CPT | Performed by: PHYSICAL MEDICINE & REHABILITATION

## 2024-08-22 PROCEDURE — 3078F DIAST BP <80 MM HG: CPT | Performed by: PHYSICAL MEDICINE & REHABILITATION

## 2024-08-22 PROCEDURE — 1159F MED LIST DOCD IN RCRD: CPT | Performed by: PHYSICAL MEDICINE & REHABILITATION

## 2024-08-22 PROCEDURE — 1123F ACP DISCUSS/DSCN MKR DOCD: CPT | Performed by: PHYSICAL MEDICINE & REHABILITATION

## 2024-08-22 ASSESSMENT — PAIN SCALES - GENERAL: PAINLEVEL: 1

## 2024-08-22 NOTE — PROGRESS NOTES
ref by Dr. Garza for back and leg pain and weakness.      History of Present IllnessPhysical Medicine and Rehabilitation [facility]     Chief Complaint:  Low back pain     HPI:  Ms. Wills is a 69 yo F w pmh of obesity, lymphedema, lipedema, hypothyroidism, gerd presenting with back and hip pain.     Recall  Onset: progressively worse in last 15 years  Location: across low back   Radiation: bilateral, worse on R, R lateral leg numbness ad in the calf  Quality: sharp  Duration: comes and goes  Aggravating factors: standing and walking, standing is worse  walking causes cramping in the legs and across the back  Uses a cane   Alleviating factors: laying down  Severity: [5/10] today, [10/10] most severe  Numbness/Tingling: yes idiopathic neuropathy in feet and hands  Bowel or bladder incontinence: yes overactive, bowel depends on food  Pt's current medication regimen includes: advil prn helps but cant take it renal insufficiency  tylen w some relief but less than advil     Treatment to date:  Physical therapy: none  Medications taken to date for this complaint include the following:   as above  Prior injections: 15 years ago not sure   ? nerve blocks      She was last seen in June 2023. at that time we discussed:  Back pain; likely spinal stenosis and facet arthropathy  - xr L spine, xr hips  - PT for core rom hep Le strengthening  - discussing swimming for exercise  - gabapentin 300 mg bid titration explained, renal dosing  - no nsaids given renal insufficiency    Limpholipedema  - Lymphedema therapy; would need custom garments, pump  - tsh, bmp wnl except renal insufficiency    Had lymphedema eval and now pending fu appts.  pain in low back on the right, worse w standing in one spot needs to lean forward   is better.  Gabapentin is helping sleep at night.     Gabapentin 300 mg bid not sure re back,.    Received jaycee pants Sigvaris 10-15 mmhgh. Waiting for velcro wraps.   Waiting for pump.  States ankles are dowb but the  front of the shins., Feet are down. Thighs fluctuate.  Started some exercises, takes 24-48 hrs to recover, while she is doing them its ok.      She still goes to lymphedema therapy once a week. She has biker shorts. Velcro garments for below the knees and wears them during the day and then pending thigh velcro.   She has a pump at home does once a day. States pump hurts the knees.  Pain in both knees in doing back therapy.  L spine is much better. More strengthen walking.  Knee pain anterior L on the R general, pain w straightening. Takes tylenol and alternates ibuprofen.  Lidocaine patches takes the edge off.  Continues w gabapentin bid.        Just had knee injections; a little better some times pain still shoots. Overall some improvement.  Completed therapy water in end of Dec. Joined silver sneakers,. She is walking more.   Legs are stronger. Water therapy for back and knees. Overall feels better. R hip intermittent pain.  When gets up at night when first bear weight on it.   Has compression knees highs during the day, jobst 20-30 mmhg.  Doing pump once a day.  She was doing 45 mmhg. Might go to 50 mmhg. Sleeps in velcro wraps. Wears thigh pieces in the top.  Continues w gabapentin bid.      Numbness in the feet today. Was dx before w neuropathy in hands and feet. Idiopathic. Comes and goes.       Blood work wnl. She is doing her garments and exercises., Knee pain severe and uses a cane, doing pump.  Tens until helps. Has not found outside water therapy yet,    She was last seen in may 2024. At that time we discussed:  Back pain; likely spinal stenosis and facet arthropathy  - xr L spine reviewed personally shows L5/s1 degenerative disc disease,  xr hips w mild oa.  reviewed w patient and on personal review mild hip oa and extensive spondylosis  - completed water therapy for core rom hep Le strengthening; initally had improvement but now worsening  - referral MRI L spine  - discussing swimming for exercise  -  gabapentin 300 mg bid  renal fx borderline, not sure if helping discussed to titrate down to one and off and see if makes a difference   - no nsaids given renal insufficiency    Lympholipedema  stable  - Lymphedema therapycontinue  - able to do pump daily,going between 40-50 of pressure but tolerating well.  - has below knee velcro wraps,  thigh wraps  - has received jaycee pants    - tsh, bmp wnl except renal insufficiency    Knee pain  - end stage oa and obesity related  - xray knees reviewed w severe arthritis  - sp refer to pain management for us guided steroid joint injections; had one month of relief  - also tried genicular nerve blocks w no relief so did not move forward w RFA  - referral to ortho re eval for gel injections; she knows she would not be a surgical candidate due to obesity. Discussed gel less likely to help in severe oa  - if not able to do blind knee injection for gel; can consider referral to sports so can US guided.  - DME rollator for improved mobility     Numbness in feet, ho idiopathic neuropathy  - intermittent but more severe in last day  - will monitor  - could also be related to spine  - b12 and vitamin d wnl reviewed 08/22/24    Her knees feel ok. Wants to wait on gel. She saw ortho surgery, not surgical candidate, focus re weight loss.  Had repeated EMG shows severe cts L and L ulnar neuropathy. Follows w neuro will see ortho hand.  Overall lymphedema is stable. Legs feel stable.   She is doing pump daily. Wears her knee highs and jaycee compression pants.   Occasionally uses velcro wrap.  Bladder stress incontinence. Started on oxybutynin as per urology.   Sometimes has orthostatic hypotention. Had a fall one week ago, knees gave out.  Continues on ifeoma and duloxetine,.    Injections:    Bl us guided steroid injection by pain management 2/2024 : some relief so far  2. genicular nerve blocks bl April 2024; no relief    Imaging  Reviewed 08/22/24 personally  EMG  7/2024  IMPRESSION:  Impression:     This is an abnormal study. There is electrophysiologic evidence consistent with a moderately severe, left median neuropathy across the wrist. These electrophysiologic findings are consistent with a clinical diagnosis of carpal tunnel syndrome. In   addition, there is electrophysiologic evidence of a moderate left ulnar neuropathy across the wrist. The pathophysiology is predominantly demyelinating with secondary axonal loss. Lastly, there are findings suggestive of a superimposed, generalized,   axonal, motor and sensory peripheral polyneuropathy.     Please note: with the absence of needle EMG (see above) in the lower extremity a lumbosacral radiculopathy cannot be excluded.  Xr bl knee pain 12/2024  FINDINGS:  Advanced osteoarthritis left knee worst medially.      Advanced tricompartmental osteoarthritis right knee also worst  medially.      No fracture or lesion bilaterally.      IMPRESSION:  Advanced osteoarthritis bilateral knees worst in the medial  compartments    Xr pelvis 6/2023  FINDINGS:  No fracture or dislocation is evident.Mild degenerative changes seen  of the hips. Mild degenerative changes seen of the SI joints and the  pubic symphysis. Vascular calcifications are seen over the soft  tissues.     IMPRESSION:  Mild degenerative change of the hips without osseous injury evident.  Xr l spine 6/2023    FINDINGS:  There are  5 lumbar type vertebral bodies. There is grade 1  anterolisthesis of L3 on L4 and L4 on L5. Vertebral body height and  alignment  are otherwise maintained.  No fracture or dislocation is  evident.  There is severe L3-4 through L5-S1 facet degenerative  change bilaterally. There is severe L5-S1 discogenic degenerative  change. Mild discogenic and facet degenerative changes seen at other  levels of the visualized spine. Mild degenerative changes seen of the  sacroiliac joints. Vascular calcifications and surgical changes are  seen over the soft  tissues.     IMPRESSION:  1. Multilevel spondylolisthesis.  2. Degenerative change as above.     [PMH]  renal insufficiency  hypothyroidism  HTN  GERD  HLP  PVCs          [SOCHX]  retired  used to work at as a unit clerk  lives w   social alcohol  no smoking or drug use     Review of Systems:  Constitutional: Denies fever or chills, malaise, weight changes.   Eyes: Denies change in visual acuity   HENT: Denies nasal congestion or sore throat   Respiratory: Denies cough or shortness of breath   Cardiovascular: Denies chest pain or edema   GI: Denies abdominal pain, nausea, vomiting, bloody stools or diarrhea   : Denies dysuria   Integument: Denies rash   Neurologic: As per HPI  MSK: Per above HPI  Endocrine: Denies polyuria or polydipsia   Lymphatic: Denies swollen glands   Psychiatric: Denies depression or anxiety           PHYSICAL EXAM:  /70 (BP Location: Left arm, Patient Position: Sitting)   Pulse 78   Resp 20   LMP  (LMP Unknown)     General: NAD, obese  Psychiatric: appropriate mood & affect.   Cardiovascular: Normal pedal pulses; no LE edema.  Respiratory: Normal rate; unlabored breathing.  Skin: disal erythema and sensitivity bl  Lymphatic: LE lympho and lipedema  NEURO: Alert and appropriate. Speech fluent, conversing appropriately.   Motor:   Rt: HF 5/5, KE 5/5, KF 5/5, DF 5/5, EHL 5/5, PF 5/5.   Lt: HF 5/5, KE 5/5, KF 5/5, DF 5/5, EHL 5/5, PF 5/5.  Sensation:    Light touch: intact in the b/l L3-S1 dermatomes.   PP: intact in the b/l L3-S1 dermatomes  Reflexes:    Right: patellar 2+, achilles 2+,    Left: patellar 2+, achilles 2+,    Babinski's downgoing b/l; no clonus  Gait:wide based slow antalgic  MSK:  Inspection reveals no evidence of a pelvic obliqity   Spinal range of motion: Flexion to 90° degrees, Extension of 30 degrees.  There is tenderness over the lumbar paraspinals       Ttp anterior and medial joint  Flexion 80, ext -10   Antalgic gait       Impression: Ms. Wills is a  67 yo F w pmh of obesity, lymphedema, lipedema, hypothyroidism, gerd presenting with back and hip pain. 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 Made great progress in lymphedema therapy below the knees, but around knees and thighs still significant swelling. Back pain improved w therapy, but now back to baseline. She is overall more active that she has been in years. Knees are also improving after steroid injections but only lasted one month, genicular nerve block wo relief.. she does have general leg weakness and continued back pain likely consistent w spinal stenosis. Overall today feeling better, less knee pain, uses cane but not walker. Continues w regular lymphedema management w garments and pump.     Plan:     Back pain; likely spinal stenosis and facet arthropathy  - xr L spine reviewed personally shows L5/s1 degenerative disc disease,  xr hips w mild oa.  reviewed w patient and on personal review mild hip oa and extensive spondylosis  - completed water therapy for core rom hep Le strengthening; initally had improvement but now worsening  - referral MRI L spine  - discussing swimming for exercise  - gabapentin 300 mg bid  renal fx borderline, not sure if helping discussed to titrate down to one and off and see if makes a difference   - no nsaids given renal insufficiency    Lympholipedema  stable  - Lymphedema therapycontinue  - able to do pump daily,going between 40-50 of pressure but tolerating well.  - has below knee velcro wraps,  thigh wraps  - has received jaycee pants    - tsh, bmp wnl except renal insufficiency    Knee pain  - end stage oa and obesity related  - xray knees reviewed w severe arthritis  - sp refer to pain management for us guided steroid joint injections; had one month of relief  - also tried genicular nerve blocks w no relief so did not move forward w RFA  - referral to ortho re eval for gel injections; she knows she would not be a surgical  candidate due to obesity. Discussed gel less likely to help in severe oa  - if not able to do blind knee injection for gel; can consider referral to sports so can US guided.  - DME rollator for improved mobility     Numbness in feet, ho idiopathic neuropathy  - intermittent but more severe in last day  - will monitor  - could also be related to spine  - b12 and vitamin d wnl reviewed 08/22/24    Cts, ulnar neuropathy  - pending ortho hand      fu 12 weeks     Louise Patel MD  Physical Medicine and Rehabilitation

## 2024-08-30 ENCOUNTER — DOCUMENTATION (OUTPATIENT)
Dept: PRIMARY CARE | Facility: CLINIC | Age: 69
End: 2024-08-30
Payer: MEDICARE

## 2024-08-30 DIAGNOSIS — F43.21 ADJUSTMENT DISORDER WITH DEPRESSED MOOD: Primary | ICD-10-CM

## 2024-09-05 DIAGNOSIS — K21.9 GASTRO-ESOPHAGEAL REFLUX DISEASE WITHOUT ESOPHAGITIS: ICD-10-CM

## 2024-09-05 RX ORDER — FAMOTIDINE 20 MG/1
20 TABLET, FILM COATED ORAL NIGHTLY
Qty: 90 TABLET | Refills: 1 | Status: SHIPPED | OUTPATIENT
Start: 2024-09-05

## 2024-09-17 ENCOUNTER — HOSPITAL ENCOUNTER (OUTPATIENT)
Dept: RADIOLOGY | Facility: HOSPITAL | Age: 69
Discharge: HOME | End: 2024-09-17
Payer: MEDICARE

## 2024-09-17 ENCOUNTER — OFFICE VISIT (OUTPATIENT)
Dept: ORTHOPEDIC SURGERY | Facility: CLINIC | Age: 69
End: 2024-09-17
Payer: MEDICARE

## 2024-09-17 DIAGNOSIS — G56.02 CARPAL TUNNEL SYNDROME ON LEFT: Primary | ICD-10-CM

## 2024-09-17 DIAGNOSIS — M25.511 RIGHT SHOULDER PAIN, UNSPECIFIED CHRONICITY: ICD-10-CM

## 2024-09-17 DIAGNOSIS — M75.41 SHOULDER IMPINGEMENT SYNDROME, RIGHT: ICD-10-CM

## 2024-09-17 PROCEDURE — 1125F AMNT PAIN NOTED PAIN PRSNT: CPT | Performed by: ORTHOPAEDIC SURGERY

## 2024-09-17 PROCEDURE — 99213 OFFICE O/P EST LOW 20 MIN: CPT | Performed by: ORTHOPAEDIC SURGERY

## 2024-09-17 PROCEDURE — 73030 X-RAY EXAM OF SHOULDER: CPT | Mod: RT

## 2024-09-17 PROCEDURE — 1159F MED LIST DOCD IN RCRD: CPT | Performed by: ORTHOPAEDIC SURGERY

## 2024-09-17 PROCEDURE — 1123F ACP DISCUSS/DSCN MKR DOCD: CPT | Performed by: ORTHOPAEDIC SURGERY

## 2024-09-17 PROCEDURE — 1160F RVW MEDS BY RX/DR IN RCRD: CPT | Performed by: ORTHOPAEDIC SURGERY

## 2024-09-17 PROCEDURE — 2500000004 HC RX 250 GENERAL PHARMACY W/ HCPCS (ALT 636 FOR OP/ED): Performed by: ORTHOPAEDIC SURGERY

## 2024-09-17 PROCEDURE — 2500000005 HC RX 250 GENERAL PHARMACY W/O HCPCS: Performed by: ORTHOPAEDIC SURGERY

## 2024-09-17 PROCEDURE — 20611 DRAIN/INJ JOINT/BURSA W/US: CPT | Mod: RT | Performed by: ORTHOPAEDIC SURGERY

## 2024-09-17 PROCEDURE — 73030 X-RAY EXAM OF SHOULDER: CPT | Mod: RIGHT SIDE | Performed by: STUDENT IN AN ORGANIZED HEALTH CARE EDUCATION/TRAINING PROGRAM

## 2024-09-17 PROCEDURE — 1036F TOBACCO NON-USER: CPT | Performed by: ORTHOPAEDIC SURGERY

## 2024-09-17 RX ORDER — SODIUM CHLORIDE, SODIUM LACTATE, POTASSIUM CHLORIDE, CALCIUM CHLORIDE 600; 310; 30; 20 MG/100ML; MG/100ML; MG/100ML; MG/100ML
20 INJECTION, SOLUTION INTRAVENOUS CONTINUOUS
OUTPATIENT
Start: 2024-09-17

## 2024-09-17 RX ORDER — LIDOCAINE HYDROCHLORIDE 10 MG/ML
3 INJECTION, SOLUTION INFILTRATION; PERINEURAL
Status: COMPLETED | OUTPATIENT
Start: 2024-09-17 | End: 2024-09-17

## 2024-09-17 ASSESSMENT — ENCOUNTER SYMPTOMS
EYE DISCHARGE: 0
NECK STIFFNESS: 1
JOINT SWELLING: 1
ARTHRALGIAS: 1
NECK PAIN: 1
CHILLS: 0
SHORTNESS OF BREATH: 0
SINUS PRESSURE: 0
TROUBLE SWALLOWING: 0
WOUND: 0
WHEEZING: 0
FEVER: 0

## 2024-09-17 ASSESSMENT — PAIN - FUNCTIONAL ASSESSMENT: PAIN_FUNCTIONAL_ASSESSMENT: 0-10

## 2024-09-17 ASSESSMENT — PAIN SCALES - GENERAL: PAINLEVEL_OUTOF10: 4

## 2024-09-17 NOTE — PROGRESS NOTES
Reason for Appointment  Chief Complaint   Patient presents with    Right Shoulder - Pain     History of Present Illness  Patient is a 69 y.o. female here today for follow-up evaluation of her right shoulder and left hand numbness.  She has a history of a previous right carpal tunnel release and right hand trigger releases.  She has had increased right shoulder pain and some posterior shoulder blade pain that radiates down the arm.  She is also having increased numbness and tingling in the left hand.  Recent EMG is reviewed with the report and shows moderately severe left carpal tunnel syndrome and moderate left ulnar neuropathy across the wrist with no signs of ulnar neuropathy at the elbow.  X-rays taken today of the right shoulder are reviewed and show moderate before meals and mild glenohumeral joint arthritis.  No other changes in her past medical history, allergies, or medications.    Past Medical History:   Diagnosis Date    Abnormal weight gain 05/04/2016    Weight gain    Candidiasis, unspecified 05/22/2015    Yeast infection    Cellulitis, unspecified 09/07/2014    Cellulitis    Encounter for screening for malignant neoplasm of cervix 05/22/2015    Pap smear for cervical cancer screening    Encounter for screening mammogram for malignant neoplasm of breast 05/22/2015    Visit for screening mammogram    Encounter for screening mammogram for malignant neoplasm of breast 05/22/2015    Visit for screening mammogram    Furuncle of buttock 09/18/2019    Boil, buttock    GERD (gastroesophageal reflux disease)     Hereditary and idiopathic neuropathy, unspecified 09/29/2013    Idiopathic peripheral neuropathy    Hypertension     Hypothyroid     Lymphedema, not elsewhere classified 05/22/2015    Lymphedema    Other symptoms and signs involving the musculoskeletal system 05/03/2016    Complaints of leg weakness    Pain in left hip 05/03/2016    Hip pain, acute, left    Personal history of other diseases of the  nervous system and sense organs     History of peripheral neuropathy    Personal history of other diseases of the respiratory system 04/10/2015    History of acute bronchitis    Personal history of other diseases of the respiratory system 06/22/2015    History of sinusitis    Personal history of other specified conditions 06/28/2016    History of dizziness    Personal history of other specified conditions 05/22/2015    History of chest pain    Sleep apnea     Unspecified asthma with (acute) exacerbation ([facility]) 05/03/2016    Asthma exacerbation    Weakness of trunk musculature 11/01/2023       Past Surgical History:   Procedure Laterality Date    CARDIAC CATHETERIZATION  04/03/2023    CARPAL TUNNEL RELEASE  09/27/2013    Neuroplasty Decompression Median Nerve At Carpal Tunnel    CHOLECYSTECTOMY  09/27/2013    Cholecystectomy    COLONOSCOPY      DILATION AND CURETTAGE OF UTERUS         Medication Documentation Review Audit       Reviewed by Camryn Johnson PA-C (Physician Assistant) on 09/17/24 at 0950      Medication Order Taking? Sig Documenting Provider Last Dose Status   acetaminophen (Tylenol) 500 mg tablet [id number] Yes Take by mouth every 6 hours if needed. Historical Provider, MD Taking Active   albuterol 90 mcg/actuation inhaler [id number] Yes Inhale. Historical Provider, MD Taking Active   atenolol (Tenormin) 100 mg tablet [id number] Yes Take 0.5 tablets (50 mg) by mouth once daily. Octavia Garza MD Taking Active   azelastine (Astelin) 137 mcg (0.1 %) nasal spray [id number] Yes Administer 2 sprays into each nostril 2 times a day. Historical Provider, MD Taking Active   cholecalciferol (Vitamin D-3) 5,000 Units tablet [id number] Yes Take 1 tablet (5,000 Units) by mouth once daily. Historical Provider, MD Taking Active   ciprofloxacin (Cipro) tablet 500 mg [id number]   Perez Wild MD MPH  Active   DULoxetine (Cymbalta) 60 mg DR capsule [id number] Yes Take 1 capsule (60 mg) by mouth once daily. Octavia Garza  MD Taking Active   famotidine (Pepcid) 20 mg tablet [id number] Yes TAKE 1 TABLET BY MOUTH EVERYDAY AT BEDTIME Simón Isabel MD Taking Active   flunisolide (Nasalide) 25 mcg (0.025 %) spray,non-aerosol [id number] Yes Administer into affected nostril(s). Historical Provider, MD Taking Active   gabapentin (Neurontin) 300 mg capsule [id number] Yes TAKE 1 CAPSULE BY MOUTH TWICE A DAY Louise Patel MD Taking Active   glucosamine HCl-msm-chondroitn 750-375-400 mg tablet [id number] Yes Take by mouth. Historical Provider, MD Taking Active   hydroCHLOROthiazide (HYDRODiuril) 25 mg tablet [id number] Yes TAKE 1 TABLET DAILY Octavia Garza MD Taking Active   levothyroxine (Synthroid, Levoxyl) 50 mcg tablet [id number] Yes TAKE 1 TABLET DAILY Octavia Garza MD Taking Active   lisinopril 40 mg tablet [id number] Yes TAKE 1 TABLET DAILY Octavia Garza MD Taking Active   multivitamin capsule [id number] Yes Take by mouth. Historical Provider, MD Taking Active   oxybutynin (Ditropan) 5 mg tablet [id number] Yes Take 1 tablet (5 mg) by mouth 2 times a day. Perez Wild MD MPH Taking Active   rosuvastatin (Crestor) 10 mg tablet [id number] Yes Take 1 tablet (10 mg) by mouth once daily at bedtime. Historical Provider, MD Taking Active                    No Known Allergies    Review of Systems   Constitutional:  Negative for chills and fever.   HENT:  Negative for nosebleeds, sinus pressure and trouble swallowing.    Eyes:  Negative for discharge.   Respiratory:  Negative for shortness of breath and wheezing.    Cardiovascular:  Negative for chest pain.   Musculoskeletal:  Positive for arthralgias, joint swelling, neck pain and neck stiffness.   Skin:  Negative for rash and wound.   All other systems reviewed and are negative.    Exam   On exam she has some stiffness with neck motion.  She has positive impingement signs on the right but fairly good cuff strength with resisted external rotation.  Tenderness over the right trapezius and right  medial scapular border.  Negative Tinel's over the left ulnar nerve, positive Tinel's over the left median nerve.  DJD in the hands but no severe atrophy.  No active triggering of the digits today.  Good pulses and sensation in the upper extremities.    Assessment   Encounter Diagnoses   Name Primary?    Right shoulder pain, unspecified chronicity     Carpal tunnel syndrome on left Yes    Shoulder impingement syndrome, right      Plan   She is having classic impingement symptoms today in the right shoulder but also may be some cervical radicular symptoms, we discussed an injection today into the shoulder and we will refer her to pain management for her neck.  We sterilely injected under ultrasound guidance Kenalog and lidocaine into the right shoulder subacromial space.  Patient understands the small risk of infection and the signs to look for as well as flare reaction.  Hopefully this gives her good relief.  For the left hand numbness, a carpal tunnel release does release pressure of the ulnar nerve at the wrist as well.  She is not having any compression of the ulnar nerve at the elbow according to recent EMG.  We discussed a simple carpal tunnel release.  She understands the risks of surgery including nerve, artery, tendon damage, infection, continued pain, need for future surgery.  She will call and schedule a left carpal tunnel release at her convenience.    L Inj/Asp: R subacromial bursa on 9/17/2024 10:07 AM  Indications: pain  Details: 22 G needle, ultrasound-guided  Medications: 3 mL lidocaine 10 mg/mL (1 %); 30 mg triamcinolone acetonide 10 mg/mL  Outcome: tolerated well, no immediate complications    After discussing the risks and benefits of the procedure with proceeded with an injection.  Using ultrasound guidance we identified the acromion, humeral head and the subacromial bursa, images obtained. We then sterilely injected the right subacrominal space with a mixture of 30 mg of Kenalog and 2 cc of 1 %  lidocaine. Pt tolerated the procedure well without any adverse reactions.   Procedure, treatment alternatives, risks and benefits explained, specific risks discussed. Consent was given by the patient. Immediately prior to procedure a time out was called to verify the correct patient, procedure, equipment, support staff and site/side marked as required. Patient was prepped and draped in the usual sterile fashion.       Written by Camryn Yeh saw, evaluated, and treated the patient with the PA

## 2024-09-18 DIAGNOSIS — G56.02 CARPAL TUNNEL SYNDROME ON LEFT: ICD-10-CM

## 2024-09-25 ENCOUNTER — APPOINTMENT (OUTPATIENT)
Dept: PRIMARY CARE | Facility: CLINIC | Age: 69
End: 2024-09-25
Payer: MEDICARE

## 2024-09-25 ENCOUNTER — OFFICE VISIT (OUTPATIENT)
Dept: URGENT CARE | Age: 69
End: 2024-09-25
Payer: MEDICARE

## 2024-09-25 VITALS
DIASTOLIC BLOOD PRESSURE: 78 MMHG | OXYGEN SATURATION: 97 % | WEIGHT: 293 LBS | HEART RATE: 49 BPM | SYSTOLIC BLOOD PRESSURE: 137 MMHG | BODY MASS INDEX: 50.02 KG/M2 | TEMPERATURE: 98.4 F | HEIGHT: 64 IN | RESPIRATION RATE: 16 BRPM

## 2024-09-25 DIAGNOSIS — F43.21 ADJUSTMENT DISORDER WITH DEPRESSED MOOD: Primary | ICD-10-CM

## 2024-09-25 DIAGNOSIS — J01.90 ACUTE SINUSITIS, RECURRENCE NOT SPECIFIED, UNSPECIFIED LOCATION: Primary | ICD-10-CM

## 2024-09-25 RX ORDER — AMOXICILLIN AND CLAVULANATE POTASSIUM 875; 125 MG/1; MG/1
1 TABLET, FILM COATED ORAL 2 TIMES DAILY
Qty: 14 TABLET | Refills: 0 | Status: SHIPPED | OUTPATIENT
Start: 2024-09-25 | End: 2024-10-02

## 2024-09-25 ASSESSMENT — ENCOUNTER SYMPTOMS
HEADACHES: 1
SINUS PRESSURE: 1
SINUS PAIN: 1

## 2024-09-25 ASSESSMENT — ANXIETY QUESTIONNAIRES
7. FEELING AFRAID AS IF SOMETHING AWFUL MIGHT HAPPEN: NOT AT ALL
6. BECOMING EASILY ANNOYED OR IRRITABLE: SEVERAL DAYS
1. FEELING NERVOUS, ANXIOUS, OR ON EDGE: SEVERAL DAYS
3. WORRYING TOO MUCH ABOUT DIFFERENT THINGS: NOT AT ALL
IF YOU CHECKED OFF ANY PROBLEMS ON THIS QUESTIONNAIRE, HOW DIFFICULT HAVE THESE PROBLEMS MADE IT FOR YOU TO DO YOUR WORK, TAKE CARE OF THINGS AT HOME, OR GET ALONG WITH OTHER PEOPLE: NOT DIFFICULT AT ALL
5. BEING SO RESTLESS THAT IT IS HARD TO SIT STILL: NOT AT ALL
4. TROUBLE RELAXING: SEVERAL DAYS
GAD7 TOTAL SCORE: 3
2. NOT BEING ABLE TO STOP OR CONTROL WORRYING: NOT AT ALL

## 2024-09-25 ASSESSMENT — PATIENT HEALTH QUESTIONNAIRE - PHQ9
5. POOR APPETITE OR OVEREATING: NOT AT ALL
8. MOVING OR SPEAKING SO SLOWLY THAT OTHER PEOPLE COULD HAVE NOTICED. OR THE OPPOSITE, BEING SO FIGETY OR RESTLESS THAT YOU HAVE BEEN MOVING AROUND A LOT MORE THAN USUAL: SEVERAL DAYS
3. TROUBLE FALLING OR STAYING ASLEEP: SEVERAL DAYS
7. TROUBLE CONCENTRATING ON THINGS, SUCH AS READING THE NEWSPAPER OR WATCHING TELEVISION: NOT AT ALL
10. IF YOU CHECKED OFF ANY PROBLEMS, HOW DIFFICULT HAVE THESE PROBLEMS MADE IT FOR YOU TO DO YOUR WORK, TAKE CARE OF THINGS AT HOME, OR GET ALONG WITH OTHER PEOPLE: NOT DIFFICULT AT ALL
6. FEELING BAD ABOUT YOURSELF - OR THAT YOU ARE A FAILURE OR HAVE LET YOURSELF OR YOUR FAMILY DOWN: SEVERAL DAYS
4. FEELING TIRED OR HAVING LITTLE ENERGY: MORE THAN HALF THE DAYS
2. FEELING DOWN, DEPRESSED OR HOPELESS: SEVERAL DAYS
SUM OF ALL RESPONSES TO PHQ QUESTIONS 1-9: 7
1. LITTLE INTEREST OR PLEASURE IN DOING THINGS: SEVERAL DAYS
9. THOUGHTS THAT YOU WOULD BE BETTER OFF DEAD, OR OF HURTING YOURSELF: NOT AT ALL
SUM OF ALL RESPONSES TO PHQ9 QUESTIONS 1 & 2: 2
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
## 2024-09-25 NOTE — PATIENT INSTRUCTIONS
You were seen at Urgent Care today for sinus pain/pressure.  You are diagnosed with a sinus infection. Please treat as discussed. Please take medications as prescribed. Monitor for red flags which we spoke about, If your symptoms change, worsen or become concerning in any way, please go to the emergency room immediately, otherwise you can followup with your PCP in 2-3 days as needed

## 2024-09-25 NOTE — PROGRESS NOTES
Subjective   Patient ID: Ariana Wills is a 69 y.o. female. They present today with a chief complaint of Headache and Earache (Pressure behind right eye x 2 days).    History of Present Illness  69-year-old female patient with history of GERD, hypertension, hypothyroid and asthma presents to urgent care today with ongoing, worsening sinus pain/pressure, headache and right ear pain.  She states she gets sinus infections approximately twice each year and her symptoms are consistent with how she normally feels.  She denies any fevers, chills, nausea, vomiting or visual changes.  No other complaints or concerns mention at this time.      History provided by:  Patient  Headache  Associated symptoms: ear pain and sinus pressure    Earache   Associated symptoms include headaches.       Past Medical History  Allergies as of 09/25/2024    (No Known Allergies)       (Not in a hospital admission)         Past Medical History:   Diagnosis Date    Abnormal weight gain 05/04/2016    Weight gain    Candidiasis, unspecified 05/22/2015    Yeast infection    Cellulitis, unspecified 09/07/2014    Cellulitis    Encounter for screening for malignant neoplasm of cervix 05/22/2015    Pap smear for cervical cancer screening    Encounter for screening mammogram for malignant neoplasm of breast 05/22/2015    Visit for screening mammogram    Encounter for screening mammogram for malignant neoplasm of breast 05/22/2015    Visit for screening mammogram    Furuncle of buttock 09/18/2019    Boil, buttock    GERD (gastroesophageal reflux disease)     Hereditary and idiopathic neuropathy, unspecified 09/29/2013    Idiopathic peripheral neuropathy    Hypertension     Hypothyroid     Lymphedema, not elsewhere classified 05/22/2015    Lymphedema    Other symptoms and signs involving the musculoskeletal system 05/03/2016    Complaints of leg weakness    Pain in left hip 05/03/2016    Hip pain, acute, left    Personal history of other diseases of the  "nervous system and sense organs     History of peripheral neuropathy    Personal history of other diseases of the respiratory system 04/10/2015    History of acute bronchitis    Personal history of other diseases of the respiratory system 06/22/2015    History of sinusitis    Personal history of other specified conditions 06/28/2016    History of dizziness    Personal history of other specified conditions 05/22/2015    History of chest pain    Sleep apnea     Unspecified asthma with (acute) exacerbation ([facility]) 05/03/2016    Asthma exacerbation    Weakness of trunk musculature 11/01/2023       Past Surgical History:   Procedure Laterality Date    CARDIAC CATHETERIZATION  04/03/2023    CARPAL TUNNEL RELEASE  09/27/2013    Neuroplasty Decompression Median Nerve At Carpal Tunnel    CHOLECYSTECTOMY  09/27/2013    Cholecystectomy    COLONOSCOPY      DILATION AND CURETTAGE OF UTERUS          reports that she has never smoked. She has never used smokeless tobacco. She reports current alcohol use. She reports that she does not use drugs.    Review of Systems  Review of Systems   HENT:  Positive for ear pain, sinus pressure and sinus pain.    Neurological:  Positive for headaches.                                  Objective    Vitals:    09/25/24 1057   BP: 137/78   Pulse: (!) 49   Resp: 16   Temp: 36.9 °C (98.4 °F)   SpO2: 97%   Weight: 144 kg (318 lb)   Height: 1.626 m (5' 4\")     No LMP recorded (lmp unknown). Patient is postmenopausal.    Physical Exam  Vitals and nursing note reviewed.   Constitutional:       General: She is not in acute distress.     Appearance: Normal appearance. She is obese. She is not ill-appearing, toxic-appearing or diaphoretic.   HENT:      Head: Normocephalic and atraumatic.      Right Ear: Tympanic membrane, ear canal and external ear normal. There is no impacted cerumen.      Left Ear: Tympanic membrane, ear canal and external ear normal. There is no impacted cerumen.      Nose: Congestion " present.      Right Sinus: Maxillary sinus tenderness and frontal sinus tenderness present.      Mouth/Throat:      Mouth: Mucous membranes are moist.   Eyes:      Extraocular Movements: Extraocular movements intact.      Conjunctiva/sclera: Conjunctivae normal.      Pupils: Pupils are equal, round, and reactive to light.   Cardiovascular:      Rate and Rhythm: Regular rhythm. Tachycardia present.      Pulses: Normal pulses.      Heart sounds: Normal heart sounds.   Pulmonary:      Effort: Pulmonary effort is normal. No respiratory distress.      Breath sounds: Normal breath sounds. No stridor. No wheezing, rhonchi or rales.   Chest:      Chest wall: No tenderness.   Musculoskeletal:         General: Normal range of motion.      Cervical back: Normal range of motion and neck supple.   Skin:     General: Skin is warm and dry.      Capillary Refill: Capillary refill takes less than 2 seconds.   Neurological:      General: No focal deficit present.      Mental Status: She is alert and oriented to person, place, and time.   Psychiatric:         Mood and Affect: Mood normal.         Behavior: Behavior normal.         Procedures      Assessment/Plan   Allergies, medications, history, and pertinent labs/EKGs/Imaging reviewed by CAPO Cisneros.     Medical Decision Making  Patient presents with headache, sinus pain/pressure and right ear pain.  Differential diagnosis includes sinusitis, otitis media, URI, other.  History and physical exam consistent with acute sinusitis.  Prescription for Augmentin called into patient's pharmacy use as directed.  I also spoke to the patient regarding her low heart rate.  She states she is aware and is currently undergoing medication adjustment and evaluation by her PCP for this.  She denies any associated symptoms today but states she did get dizzy yesterday which has been an ongoing problem.     I have reviewed the patient's vital signs, nursing notes, and other relevant  tests/information.  I had a detailed discussion regarding the historical points, exam findings, and any diagnostic results supporting the discharge diagnosis.  I also discussed the need for outpatient follow-up and the need to go to the ED if symptoms worsen,  become concerning in any way or if there are any questions or concerns that arise at home.  Patient is in agreement this plan.  All questions and concerns addressed.    Orders and Diagnoses  Diagnoses and all orders for this visit:  Acute sinusitis, recurrence not specified, unspecified location  -     amoxicillin-pot clavulanate (Augmentin) 875-125 mg tablet; Take 1 tablet by mouth 2 times a day for 7 days.      Medical Admin Record      Follow Up Instructions  No follow-ups on file.    Patient disposition: Home    Electronically signed by CAPO Cisneros  11:08 AM

## 2024-09-30 ENCOUNTER — DOCUMENTATION (OUTPATIENT)
Dept: PRIMARY CARE | Facility: CLINIC | Age: 69
End: 2024-09-30
Payer: MEDICARE

## 2024-09-30 DIAGNOSIS — F43.21 ADJUSTMENT DISORDER WITH DEPRESSED MOOD: Primary | ICD-10-CM

## 2024-09-30 PROCEDURE — 99493 SBSQ PSYC COLLAB CARE MGMT: CPT | Performed by: INTERNAL MEDICINE

## 2024-10-09 NOTE — Clinical Note
Hi Dr. Garza,   With Ariana's chronic pain component, as well as depression consideration of starting Cymbalta. For the 65+ age range, recommended starting at lower dose of 30mg and can consider increase to target dose of 60mg after 2 weeks. As she is seeing Neurology and Pain Management, I would recommend checking in with both teams prior to starting the medication for any different preferences on their end.  However, a lot of her depressed mood comes from limitations d/t her physical health. Therapy will be a much bigger component than medications in situations such as this and working on grief processing, looking forward/figuring out what fulfills her now v. Comparing to where her pain and mobility used to be. The Cymbalta may help some but I think working with PT/OT for safe modifications to allow activities she would like to still do such as knitting may give her some motivation/mood improvement as well. 
include but are not limited to bleeding, vascular injury, nerve injury with risk of a foot drop or paresthesia, deep vein thrombosis/pulmonary embolism, infection, instability, altered sensation, persistent pain, loosening/wear/failure, and stifness.  The benefits are pain relief and improved mobility.  The alternatives would include continued non-operative treatment.    Prior to surgery we will arrange for:   1) medical optimization:  +  2) total joint class:  +  3) nicotine testing:  no  4) cardiac optimization:  no      They are at low risk for DVT and we will plan to use ECASA , SCDs, and early mobilization for DVT prophylaxis.  They will obtain a walker from the total joint class for use to prevent falling with ambulation after surgery.  They will need prehab physical therapy at [facility] and outpatient physical therapy.        Signed By: Bao Friedman MD  October 9, 2024

## 2024-11-26 ENCOUNTER — APPOINTMENT (OUTPATIENT)
Dept: PREADMISSION TESTING | Facility: HOSPITAL | Age: 69
End: 2024-11-26
Payer: MEDICARE

## 2024-12-03 DIAGNOSIS — M54.50 LOW BACK PAIN, UNSPECIFIED: ICD-10-CM

## 2024-12-03 DIAGNOSIS — F51.02 ADJUSTMENT INSOMNIA: Primary | ICD-10-CM

## 2024-12-03 DIAGNOSIS — G89.29 OTHER CHRONIC PAIN: ICD-10-CM

## 2024-12-03 RX ORDER — GABAPENTIN 300 MG/1
300 CAPSULE ORAL 2 TIMES DAILY
Qty: 60 CAPSULE | Refills: 3 | Status: SHIPPED | OUTPATIENT
Start: 2024-12-03

## 2024-12-04 NOTE — CPM/PAT H&P
CPM/PAT Evaluation       Name: Ariana Wills (Ariana Wills)  /Age: 1955/69 y.o.     Telemedicine  In-Person       Chief Complaint: carpal tunnel syndrome    HPI This is a 70 yo female who is referred to PAT for evaluation by Dr Yeh in advance of her decompression L median nerve 24.    See PMH below     Past Medical History:   Diagnosis Date    Abnormal weight gain 2016    Weight gain    Anemia     Anxiety     Arthritis     Candidiasis, unspecified 2015    Yeast infection    Cellulitis, unspecified 2014    Cellulitis    Chronic pain disorder     CKD (chronic kidney disease)     Colon polyp     Delayed emergence from general anesthesia     Depression     Diverticulosis     Dizziness     Encounter for screening for malignant neoplasm of cervix 2015    Pap smear for cervical cancer screening    Encounter for screening mammogram for malignant neoplasm of breast 2015    Visit for screening mammogram    Encounter for screening mammogram for malignant neoplasm of breast 2015    Visit for screening mammogram    Furuncle of buttock 2019    Boil, buttock    GERD (gastroesophageal reflux disease)     Hereditary and idiopathic neuropathy, unspecified 2013    Idiopathic peripheral neuropathy    Hypertension     Hypothyroid     Hypothyroidism     Liver disease     Lymphedema     Lymphedema, not elsewhere classified 2015    Lymphedema    BOYER (nonalcoholic steatohepatitis)     Obesity     Other symptoms and signs involving the musculoskeletal system 2016    Complaints of leg weakness    Pain in left hip 2016    Hip pain, acute, left    Personal history of other diseases of the nervous system and sense organs     History of peripheral neuropathy    Personal history of other diseases of the respiratory system 04/10/2015    History of acute bronchitis    Personal history of other diseases of the respiratory system 2015    History of  sinusitis    Personal history of other specified conditions 06/28/2016    History of dizziness    Personal history of other specified conditions 05/22/2015    History of chest pain    Sleep apnea     Spinal stenosis     Unspecified asthma with (acute) exacerbation ([facility]) 05/03/2016    Asthma exacerbation    Vertigo     Weakness of trunk musculature 11/01/2023       Past Surgical History:   Procedure Laterality Date    BLEPHAROPLASTY      CARDIAC CATHETERIZATION  04/03/2023    CARPAL TUNNEL RELEASE  09/27/2013    Neuroplasty Decompression Median Nerve At Carpal Tunnel    CHOLECYSTECTOMY  09/27/2013    Cholecystectomy    COLONOSCOPY      DILATION AND CURETTAGE OF UTERUS         Patient Sexual activity questions deferred to the physician.    Family History   Problem Relation Name Age of Onset    Colon cancer Mother      Other (cardiac disorder) Mother      Heart failure Father      Other (INTERNAL BLEEDING) Brother      Asthma Other      Heart disease Other      Hypertension Other      Sleep disorder Other      Arthritis Other      Other (hay fever) Other         No Known Allergies    Medication Documentation Review Audit       Reviewed by Rebecca Talbert RN (Registered Nurse) on 12/09/24 at 0818      Medication Order Taking? Sig Documenting Provider Last Dose Status   acetaminophen (Tylenol) 500 mg tablet [id number] Yes Take by mouth every 6 hours if needed. Historical Provider, MD Past Week Active   albuterol 90 mcg/actuation inhaler [id number] Yes Inhale every 4 hours if needed. Historical Provider, MD Past Month Active   atenolol (Tenormin) 100 mg tablet [id number] Yes Take 0.5 tablets (50 mg) by mouth once daily. Octavia Garza MD 12/9/2024 Morning Active   azelastine (Astelin) 137 mcg (0.1 %) nasal spray [id number] Yes Administer 2 sprays into each nostril 2 times a day. Historical Provider, MD Past Month Active   cholecalciferol (Vitamin D-3) 5,000 Units tablet [id number] Yes Take 1 tablet (5,000 Units) by mouth once  daily. Aaron Encinas MD 12/9/2024 Morning Active   ciprofloxacin (Cipro) tablet 500 mg [id number]   Perez Wild MD MPH  Active   diclofenac sodium (Voltaren Arthritis Pain) 1 % gel [id number] Yes Apply 4.5 inches (4 g) topically 4 times a day as needed. Aaron Encinas MD Past Week Active   DULoxetine (Cymbalta) 60 mg DR capsule [id number] Yes Take 1 capsule (60 mg) by mouth once daily. Octavia Garza MD 12/8/2024 Evening Active   famotidine (Pepcid) 20 mg tablet [id number] Yes TAKE 1 TABLET BY MOUTH EVERYDAY AT BEDTIME Simón Isabel MD 12/8/2024 Evening Active   flunisolide (Nasalide) 25 mcg (0.025 %) spray,non-aerosol [id number] Yes Administer into affected nostril(s) once daily as needed. Aaron Encinas MD Past Week Active     Discontinued 12/03/24 1225   gabapentin (Neurontin) 300 mg capsule [id number] Yes TAKE 1 CAPSULE BY MOUTH TWICE A DAY Louise Patel MD 12/9/2024 Morning Active   glucosamine HCl-msm-chondroitn 750-375-400 mg tablet [id number] No Take by mouth.   Patient not taking: Reported on 12/9/2024    Aaron Encinas MD Not Taking Active   hydroCHLOROthiazide (HYDRODiuril) 25 mg tablet [id number] Yes TAKE 1 TABLET DAILY Octavia Garza MD 12/9/2024 Morning Active   ibuprofen 200 mg tablet [id number] Yes Take by mouth every 6 hours if needed for mild pain (1 - 3). Aaron Encinas MD 12/8/2024 Active   levothyroxine (Synthroid, Levoxyl) 50 mcg tablet [id number] Yes TAKE 1 TABLET DAILY Octavia Garza MD 12/9/2024 Morning Active   lisinopril 40 mg tablet [id number] Yes TAKE 1 TABLET DAILY Octavia Garza MD 12/9/2024 Morning Active   multivitamin capsule [id number] Yes Take by mouth once daily. Aaron Encinas MD 12/8/2024 Active   oxybutynin (Ditropan) 5 mg tablet [id number] Yes Take 1 tablet (5 mg) by mouth 2 times a day. Perez Wild MD MPH 12/9/2024 Morning Active   rosuvastatin (Crestor) 10 mg tablet [id number] Yes Take 1 tablet (10 mg) by mouth once daily at bedtime.  "Historical Provider, MD 12/8/2024 Bedtime Active                         PAT ROS:   Constitutional:    Uses a cane  neg    Neuro/Psych:    numbness   weakness   light-headedness  Eyes:   neg    Ears:   neg    Nose:   neg    Mouth:    Very dry  Throat:   neg    Neck:   neg    Cardio:   neg    Respiratory:   neg    Endocrine:   neg    GI:   neg    :    OAB  Musculoskeletal:    arthralgias  Hematologic:   neg    Skin:  neg        Physical Exam  Vitals and nursing note reviewed. Physical exam within normal limits.   Constitutional:       Appearance: She is obese.   HENT:      Mouth/Throat:      Mouth: Mucous membranes are dry.   Cardiovascular:      Rate and Rhythm: Bradycardia present.      Heart sounds: Normal heart sounds.   Abdominal:      General: Bowel sounds are normal.   Musculoskeletal:      Right lower leg: Edema present.      Left lower leg: Edema present.   Neurological:      Mental Status: Mental status is at baseline.   Psychiatric:         Mood and Affect: Mood normal.          PAT AIRWAY:   Airway:     Mallampati::  III    TM distance::  >3 FB    Neck ROM::  Full      Visit Vitals  /57   Pulse 63   Temp (!) 2.6 °C (36.6 °F) (Temporal)   Resp 20   Ht 1.626 m (5' 4\")   Wt 137 kg (301 lb 9.6 oz)   LMP  (LMP Unknown)   SpO2 95%   BMI 51.77 kg/m²   OB Status Postmenopausal   Smoking Status Never   BSA 2.49 m²       DASI Risk Score      Flowsheet Row Pre-Admission Testing [facility] 12/9/2024 in [facility]   Can you take care of yourself (eat, dress, bathe, or use toilet)?  2.75 filed at 12/09/2024 0907   Can you walk indoors, such as around your house? 1.75 filed at 12/09/2024 0907   Can you walk a block or two on level ground?  2.75 filed at 12/09/2024 0907   Can you climb a flight of stairs or walk up a hill? 5.5 filed at 12/09/2024 0907   Can you run a short distance? 0 filed at 12/09/2024 0907   Can you do light work around the house like dusting or washing dishes? 2.7 filed at " 12/09/2024 0907   Can you do moderate work around the house like vacuuming, sweeping floors or carrying groceries? 3.5 filed at 12/09/2024 0907   Can you do heavy work around the house like scrubbing floors or lifting and moving heavy furniture?  8 filed at 12/09/2024 0907   Can you do yard work like raking leaves, weeding or pushing a mower? 4.5 filed at 12/09/2024 0907   Can you have sexual relations? 5.25 filed at 12/09/2024 0907   Can you participate in moderate recreational activities like golf, bowling, dancing, doubles tennis or throwing a baseball or football? 0 filed at 12/09/2024 0907   Can you participate in strenous sports like swimming, singles tennis, football, basketball, or skiing? 0 filed at 12/09/2024 0907   DASI SCORE 36.7 filed at 12/09/2024 0907   METS Score (Will be calculated only when all the questions are answered) 7.3 filed at 12/09/2024 0907          Caprini DVT Assessment      Flowsheet Row Pre-Admission Testing from 12/9/2024 in [facility]   DVT Score 6 filed at 12/09/2024 0911   Surgical Factors Minor surgery planned filed at 12/09/2024 0911   BMI Greater than 50 (Venous stasis syndrome) filed at 12/09/2024 0911          Modified Frailty Index    No data to display       CHADS2 Stroke Risk  Current as of 41 minutes ago        N/A 3 to 100%: High Risk   2 to < 3%: Medium Risk   0 to < 2%: Low Risk     Last Change: N/A          This score determines the patient's risk of having a stroke if the patient has atrial fibrillation.        This score is not applicable to this patient. Components are not calculated.          Revised Cardiac Risk Index      Flowsheet Row Pre-Admission Testing from 12/9/2024 in [facility]   High-Risk Surgery (Intraperitoneal, Intrathoracic,Suprainguinal vascular) 0 filed at 12/09/2024 0912   History of ischemic heart disease (History of MI, History of positive exercuse test, Current chest paint considered due to myocardial  ischemia, Use of nitrate therapy, ECG with pathological Q Waves) 0 filed at 12/09/2024 0912   History of congestive heart failure (pulmonary edemia, bilateral rales or S3 gallop, Paroxysmal nocturnal dyspnea, CXR showing pulmonary vascular redistribution) 0 filed at 12/09/2024 0912   History of cerebrovascular disease (Prior TIA or stroke) 0 filed at 12/09/2024 0912   Pre-operative insulin treatment 0 filed at 12/09/2024 0912   Pre-operative creatinine>2 mg/dl 0 filed at 12/09/2024 0912   Revised Cardiac Risk Calculator 0 filed at 12/09/2024 0912          Apfel Simplified Score      Flowsheet Row Pre-Admission Testing from 12/9/2024 in [facility]   Smoking status 1 filed at 12/09/2024 0912   History of motion sickness or PONV  0 filed at 12/09/2024 0912   Use of postoperative opioids 0 filed at 12/09/2024 0912   Gender - Female 1=Yes filed at 12/09/2024 0912   Apfel Simplified Score Calculator 2 filed at 12/09/2024 0912          Risk Analysis Index Results This Encounter    No data found in the last 10 encounters.       Stop Bang Score      Flowsheet Row Pre-Admission Testing from 12/9/2024 in [facility]   Do you snore loudly? 1 filed at 12/09/2024 0824   Do you often feel tired or fatigued after your sleep? 0 filed at 12/09/2024 0824   Has anyone ever observed you stop breathing in your sleep? 1 filed at 12/09/2024 0824   Do you have or are you being treated for high blood pressure? 1 filed at 12/09/2024 0824   Recent BMI (Calculated) 51.7 filed at 12/09/2024 0824   Is BMI greater than 35 kg/m2? 1=Yes filed at 12/09/2024 0824   Age older than 50 years old? 1=Yes filed at 12/09/2024 0824   Is your neck circumference greater than 17 inches (Male) or 16 inches (Female)? 1 filed at 12/09/2024 0824   Gender - Male 0=No filed at 12/09/2024 0824   STOP-BANG Total Score 6 filed at 12/09/2024 0824          Prodigy: High Risk  Total Score: 13              Prodigy Age Score      Prodigy SDB  Score          ARISCAT Score for Postoperative Pulmonary Complications      Flowsheet Row Pre-Admission Testing from 12/9/2024 in [facility]   Age, years  3 filed at 12/09/2024 0912   Preoperative SpO2 8 filed at 12/09/2024 0912   Respiratory infection in the last month Either upper or lower (i.e., URI, bronchitis, pneumonia), with fever and antibiotic treatment 0 filed at 12/09/2024 0912   Preoperative anemoa (Hgb less than 10 g/dl) 0 filed at 12/09/2024 0912   Surgical incision  0 filed at 12/09/2024 0912   Duration of surgery  0 filed at 12/09/2024 0912   Emergency Procedure  0 filed at 12/09/2024 0912   ARISCAT Total Score  11 filed at 12/09/2024 0912          Hiram Perioperative Risk for Myocardial Infarction or Cardiac Arrest (FARNAZ)      Flowsheet Row Pre-Admission Testing from 12/9/2024 in [facility]   Age 1.38 filed at 12/09/2024 0912   Functional Status  0 filed at 12/09/2024 0912   ASA Class  -1.92 filed at 12/09/2024 0912   Creatinine 0 filed at 12/09/2024 0912   Type of Procedure  0.80 filed at 12/09/2024 0912   FARNAZ Total Score  -4.99 filed at 12/09/2024 0912   FARNAZ % 0.68 filed at 12/09/2024 0912          Assessment and Plan     Today Her VS are stable and she is afebrile. She states she has had periods where when rising she feels faint and this has happened in Mid November and the other day at home. She did not call 911 or go to the ED.  She denies pain but has L wrist 9/10 numbness and pain at night    Neuro:  Depression- follows with behavorial health, depression r/t chronic pain r/t spinal stnosis, LE pain, lymphedema, chronic LBP.   She is not currently on medication    Peripheral neuropathy- Seen by Neurologist Dr Barlow 6/2024   Continue Gabapentin as prescribed     the patient is at an increased risk for post operative delirium secondary to age >/= 65.  Preoperative brain exercise  discussed with patient.     The patient is at an increased risk for  "perioperative stroke secondary age and co morbidities.      HEENT/Airway:  Laryngeal reflux, she is on Famotadine    Cardiovascular:  Denies chest pain, shortness of breathe,  palpations, she has periods when she feels lightheaded and blackness comes around of the sides of her eyes. This is mostly with standing or change in position. Discussed seriousness of this and she should or her family call 911. I did advise her to call her PCP    HTN- BP is slightly low today, continue hydrochlorothiazide and lisinopril as prescribed but do not take day of surgery  Home monitor BP and call PCP if abnormally low or elevated     Lymphedema- she wears pumps at night    EKG - 12/9/24 preliminary SB with PVC, ant. Infarct undetermined age    Cardiac Cath 4/3/2023 normal Coronary arteries    Pulmonary:  Asthma-denies coughing, SOB or wheezing  Albuterol inhaler as prescribed    Sleep apnea- continue CPAP, instructed to bring day of surgery    PRODIGY: Moderate risk for opioid induced respiratory depression  Discussed smoking cessation and deep breathing handout given    Renal:   CKD- stable  Avoid nephrotoxic meds and NSAIDS    Pt at Moderate risk for perioperative ANGEL based on Dynamic Predictive Scoring Tool for Perioperative ANGEL  Lab Results   Component Value Date    GLUCOSE 99 05/30/2024    CALCIUM 9.6 05/30/2024     05/30/2024    K 4.3 05/30/2024    CO2 29 05/30/2024     05/30/2024    BUN 16 05/30/2024    CREATININE 1.07 (H) 05/30/2024       Endocrine:  Obesity - BMI today is 51.74    No results found for: \"HGBA1C\"  I have added a HgA1c to todays labs    Hematologic:  + EUNICE- seen 8/9/24 by Dr Green, felt Low titre and neg. SERVANDO unlikely autoimmune disease  Lab Results   Component Value Date    WBC 6.8 12/09/2024    HGB 11.4 (L) 12/09/2024    HCT 36.1 12/09/2024    MCV 93 12/09/2024     12/09/2024       Pt supplied education/VTE handout    Gastrointestinal:   diverticulosis  EAT-10 score of 0 - " self-perceived oropharyngeal dysphagia scale (0-40)      :  OAB, continue oxybutyn as prescribed    Infectious disease:   No diagnosis or significant findings on chart review or clinical presentation and evaluation.     Musculoskeletal:   See HPI  Spinal stenosis    Preoperative risk assessment  ASA III  NSQIP - Predicted length of stay days 0  PAT Testing - cbc, bmp, A1c, EKG  Needs clearance per Dr Mcneil- pt will call his office for an appt and have a risk clearance/note faxed to PAT      Anesthesia:  Delayed emergence    denies dental issues  Smoker-denies  Drugs-denies  ETOH-rare  Denies  personal/family issues with Anesthesia      Face to Face patient contact time 45 min    FADIA Vasquez-CNP 12/9/2024 9:13 AM

## 2024-12-09 ENCOUNTER — PRE-ADMISSION TESTING (OUTPATIENT)
Dept: PREADMISSION TESTING | Facility: HOSPITAL | Age: 69
End: 2024-12-09
Payer: MEDICARE

## 2024-12-09 VITALS
DIASTOLIC BLOOD PRESSURE: 57 MMHG | OXYGEN SATURATION: 95 % | RESPIRATION RATE: 20 BRPM | HEART RATE: 63 BPM | BODY MASS INDEX: 50.02 KG/M2 | TEMPERATURE: 36.6 F | SYSTOLIC BLOOD PRESSURE: 108 MMHG | HEIGHT: 64 IN | WEIGHT: 293 LBS

## 2024-12-09 DIAGNOSIS — G56.02 CARPAL TUNNEL SYNDROME ON LEFT: ICD-10-CM

## 2024-12-09 DIAGNOSIS — R73.09 ELEVATED GLUCOSE: ICD-10-CM

## 2024-12-09 DIAGNOSIS — Z01.818 PREOP TESTING: Primary | ICD-10-CM

## 2024-12-09 LAB
ANION GAP SERPL CALC-SCNC: 11 MMOL/L (ref 10–20)
BASOPHILS # BLD AUTO: 0.05 X10*3/UL (ref 0–0.1)
BASOPHILS NFR BLD AUTO: 0.7 %
BUN SERPL-MCNC: 26 MG/DL (ref 6–23)
CALCIUM SERPL-MCNC: 9.6 MG/DL (ref 8.6–10.3)
CHLORIDE SERPL-SCNC: 100 MMOL/L (ref 98–107)
CO2 SERPL-SCNC: 31 MMOL/L (ref 21–32)
CREAT SERPL-MCNC: 1.04 MG/DL (ref 0.5–1.05)
EGFRCR SERPLBLD CKD-EPI 2021: 58 ML/MIN/1.73M*2
EOSINOPHIL # BLD AUTO: 0.08 X10*3/UL (ref 0–0.7)
EOSINOPHIL NFR BLD AUTO: 1.2 %
ERYTHROCYTE [DISTWIDTH] IN BLOOD BY AUTOMATED COUNT: 11.9 % (ref 11.5–14.5)
GLUCOSE SERPL-MCNC: 110 MG/DL (ref 74–99)
HCT VFR BLD AUTO: 36.1 % (ref 36–46)
HGB BLD-MCNC: 11.4 G/DL (ref 12–16)
IMM GRANULOCYTES # BLD AUTO: 0.01 X10*3/UL (ref 0–0.7)
IMM GRANULOCYTES NFR BLD AUTO: 0.1 % (ref 0–0.9)
LYMPHOCYTES # BLD AUTO: 1.23 X10*3/UL (ref 1.2–4.8)
LYMPHOCYTES NFR BLD AUTO: 18 %
MCH RBC QN AUTO: 29.3 PG (ref 26–34)
MCHC RBC AUTO-ENTMCNC: 31.6 G/DL (ref 32–36)
MCV RBC AUTO: 93 FL (ref 80–100)
MONOCYTES # BLD AUTO: 0.56 X10*3/UL (ref 0.1–1)
MONOCYTES NFR BLD AUTO: 8.2 %
NEUTROPHILS # BLD AUTO: 4.89 X10*3/UL (ref 1.2–7.7)
NEUTROPHILS NFR BLD AUTO: 71.8 %
NRBC BLD-RTO: ABNORMAL /100{WBCS}
PLATELET # BLD AUTO: 271 X10*3/UL (ref 150–450)
POTASSIUM SERPL-SCNC: 4.2 MMOL/L (ref 3.5–5.3)
RBC # BLD AUTO: 3.89 X10*6/UL (ref 4–5.2)
SODIUM SERPL-SCNC: 138 MMOL/L (ref 136–145)
WBC # BLD AUTO: 6.8 X10*3/UL (ref 4.4–11.3)

## 2024-12-09 PROCEDURE — 36415 COLL VENOUS BLD VENIPUNCTURE: CPT

## 2024-12-09 PROCEDURE — 93005 ELECTROCARDIOGRAM TRACING: CPT

## 2024-12-09 PROCEDURE — 99204 OFFICE O/P NEW MOD 45 MIN: CPT | Performed by: NURSE PRACTITIONER

## 2024-12-09 PROCEDURE — 85025 COMPLETE CBC W/AUTO DIFF WBC: CPT

## 2024-12-09 PROCEDURE — 82565 ASSAY OF CREATININE: CPT

## 2024-12-09 PROCEDURE — 93010 ELECTROCARDIOGRAM REPORT: CPT | Performed by: INTERNAL MEDICINE

## 2024-12-09 RX ORDER — DICLOFENAC SODIUM 10 MG/G
4 GEL TOPICAL 4 TIMES DAILY PRN
COMMUNITY

## 2024-12-09 RX ORDER — IBUPROFEN 200 MG
TABLET ORAL EVERY 6 HOURS PRN
COMMUNITY

## 2024-12-09 ASSESSMENT — DUKE ACTIVITY SCORE INDEX (DASI)
CAN YOU CLIMB A FLIGHT OF STAIRS OR WALK UP A HILL: YES
CAN YOU DO LIGHT WORK AROUND THE HOUSE LIKE DUSTING OR WASHING DISHES: YES
CAN YOU DO HEAVY WORK AROUND THE HOUSE LIKE SCRUBBING FLOORS OR LIFTING AND MOVING HEAVY FURNITURE: YES
CAN YOU RUN A SHORT DISTANCE: NO
CAN YOU WALK A BLOCK OR TWO ON LEVEL GROUND: YES
CAN YOU TAKE CARE OF YOURSELF (EAT, DRESS, BATHE, OR USE TOILET): YES
CAN YOU PARTICIPATE IN STRENOUS SPORTS LIKE SWIMMING, SINGLES TENNIS, FOOTBALL, BASKETBALL, OR SKIING: NO
CAN YOU PARTICIPATE IN MODERATE RECREATIONAL ACTIVITIES LIKE GOLF, BOWLING, DANCING, DOUBLES TENNIS OR THROWING A BASEBALL OR FOOTBALL: NO
CAN YOU DO MODERATE WORK AROUND THE HOUSE LIKE VACUUMING, SWEEPING FLOORS OR CARRYING GROCERIES: YES
TOTAL_SCORE: 36.7
CAN YOU WALK INDOORS, SUCH AS AROUND YOUR HOUSE: YES
CAN YOU HAVE SEXUAL RELATIONS: YES
DASI METS SCORE: 7.3
CAN YOU DO YARD WORK LIKE RAKING LEAVES, WEEDING OR PUSHING A MOWER: YES

## 2024-12-09 ASSESSMENT — LIFESTYLE VARIABLES: SMOKING_STATUS: NONSMOKER

## 2024-12-09 ASSESSMENT — ENCOUNTER SYMPTOMS
NUMBNESS: 1
CONSTITUTIONAL NEGATIVE: 1
LIGHT-HEADEDNESS: 1
EYES NEGATIVE: 1
ARTHRALGIAS: 1
GASTROINTESTINAL NEGATIVE: 1
CARDIOVASCULAR NEGATIVE: 1
NECK NEGATIVE: 1
RESPIRATORY NEGATIVE: 1
ENDOCRINE NEGATIVE: 1
WEAKNESS: 1

## 2024-12-09 ASSESSMENT — PAIN SCALES - GENERAL: PAINLEVEL_OUTOF10: 0 - NO PAIN

## 2024-12-09 ASSESSMENT — PAIN - FUNCTIONAL ASSESSMENT: PAIN_FUNCTIONAL_ASSESSMENT: 0-10

## 2024-12-09 NOTE — PREPROCEDURE INSTRUCTIONS
Medication List            Accurate as of December 9, 2024  8:37 AM. Always use your most recent med list.                acetaminophen 500 mg tablet  Commonly known as: Tylenol  Additional Medication Adjustments for Surgery: Other (Comment)  Notes to patient: No need to stop before surgery, ok on Day of surgery with sip of water if needed      albuterol 90 mcg/actuation inhaler  Medication Adjustments for Surgery: Take/Use as prescribed  Additional Medication Adjustments for Surgery: Other (Comment)  Notes to patient: Bring day of surgery     atenolol 100 mg tablet  Commonly known as: Tenormin  Take 0.5 tablets (50 mg) by mouth once daily.  Medication Adjustments for Surgery: Take/Use as prescribed     azelastine 137 mcg (0.1 %) nasal spray  Commonly known as: Astelin  Medication Adjustments for Surgery: Take/Use as prescribed     cholecalciferol 5,000 Units tablet  Commonly known as: Vitamin D-3  Additional Medication Adjustments for Surgery: Take last dose 7 days before surgery     DULoxetine 60 mg DR capsule  Commonly known as: Cymbalta  Take 1 capsule (60 mg) by mouth once daily.  Medication Adjustments for Surgery: Take/Use as prescribed     famotidine 20 mg tablet  Commonly known as: Pepcid  TAKE 1 TABLET BY MOUTH EVERYDAY AT BEDTIME  Medication Adjustments for Surgery: Take/Use as prescribed     flunisolide 25 mcg (0.025 %) spray,non-aerosol  Commonly known as: Nasalide  Medication Adjustments for Surgery: Take/Use as prescribed     gabapentin 300 mg capsule  Commonly known as: Neurontin  TAKE 1 CAPSULE BY MOUTH TWICE A DAY  Medication Adjustments for Surgery: Take/Use as prescribed     glucosamine HCl-msm-chondroitn 750-375-400 mg tablet  Additional Medication Adjustments for Surgery: Take last dose 7 days before surgery     hydroCHLOROthiazide 25 mg tablet  Commonly known as: HYDRODiuril  TAKE 1 TABLET DAILY  Medication Adjustments for Surgery: Do Not take on the morning of surgery     ibuprofen 200 mg  tablet  Additional Medication Adjustments for Surgery: Take last dose 7 days before surgery     levothyroxine 50 mcg tablet  Commonly known as: Synthroid, Levoxyl  TAKE 1 TABLET DAILY  Medication Adjustments for Surgery: Take/Use as prescribed     lisinopril 40 mg tablet  TAKE 1 TABLET DAILY  Medication Adjustments for Surgery: Take last dose 1 day (24 hours) before surgery  Notes to patient: Last dose EARLY on 12/17/24 or if an evening medication then last dose 12/16/24. DO NOT TAKE THE DAY OF SURGERY     multivitamin capsule  Additional Medication Adjustments for Surgery: Take last dose 7 days before surgery     oxybutynin 5 mg tablet  Commonly known as: Ditropan  Take 1 tablet (5 mg) by mouth 2 times a day.  Medication Adjustments for Surgery: Do Not take on the morning of surgery     rosuvastatin 10 mg tablet  Commonly known as: Crestor  Medication Adjustments for Surgery: Take/Use as prescribed     Voltaren Arthritis Pain 1 % gel  Generic drug: diclofenac sodium  Medication Adjustments for Surgery: Do Not take on the morning of surgery            You must have cardiac clearance from Dr Mcneil, please call his office and schedule a visit. Our fax number is [phone]    GO TO EMERGENCY ROOM AND OR CALL 911 FOR FAINTNESS OR WEAKNESS    If no issues with your liver you may take Tylenol 2-500 mg tablets every 8 hrs around the clock for pain including morning of surgery with a sip of water    STOP VITAMINS, SUPPLEMENTS, HERBS, PROBIOTICS, AND FISH OIL, NO MOTRIN OR ADVIL OR ALEVE 7 DAYS BEFORE SURGERY    IF YOU HAVE A CPAP MACHINE BRING ON DAY OF SURGERY     CONTACT SURGEON'S OFFICE IF YOU DEVELOP:  * Fever = 100.4 F   * New respiratory symptoms (e.g. cough, shortness of breath, respiratory distress, sore throat)  * Recent loss of taste or smell  *Flu like symptoms such as headache, fatigue or gastrointestinal symptoms  * You develop any open sores, shingles, burning or painful urination   AND/OR:  * You no longer  wish to have the surgery.  * Any other personal circumstances change that may lead to the need to cancel or defer this surgery.  *You were admitted to any hospital within one week of your planned procedure.     SMOKING:  *Quitting smoking can make a huge difference to your health and recovery from surgery.    *If you need help with quitting, call [phone]-QUIT-NOW.     Additional Instructions:      Seven/Six Days before Surgery:  Review your medication instructions, stop indicated medications  Five Days before Surgery:  Review your medication instructions, stop indicated medications  Begin using your Hibiclens, if prescribed  Three Days before Surgery:  Review your medication instructions, stop indicated medications  The Day before Surgery:  Start using 0.12% CHG mouthwash-if prescribed  No smoking or alcohol use 24 hours before surgery  Review your medication instructions, stop indicated medications  You will be contacted regarding the time of your arrival to facility and surgery time  Do not eat any food after Midnight  Day of Surgery:  Review your medication instructions, take indicated medications  If you have diabetes, please check your fasting blood sugar upon awakening.  If fasting blood sugar is <80 mg/dl, drink 100 ml of apple juice, time limit of 2 hours before     SURGICAL TIME:  *You will be contacted between 2 p.m. and 3 p.m. the business day before your surgery with your arrival time.  *If you haven't received a call by 3pm, call [phone]  *Scheduled surgery times may change and you will be notified if this occurs-check your personal voicemail for any updates.     ON THE MORNING OF SURGERY:  *Wear comfortable, loose fitting clothing.   *Do not use moisturizers, creams, lotions or perfume.  *All jewelry and valuables should be left at home.  *Prosthetic devices such as contact lenses, hearing aids, dentures, eyelash extensions, hairpins and body piercing must be removed before surgery.     BRING WITH  YOU:  *Photo ID and insurance card  *Current list of medications and allergies  *Pacemaker/Defibrillator/Heart stent cards  *CPAP machine and mask  *Slings/splints/crutches  *Copy of your complete Advanced Directive/DHPOA-if applicable  *Neurostimulator implant remote     PARKING AND ARRIVAL:  *Check in at the Main Entrance desk and let them know you are here for surgery.     IF YOU ARE HAVING OUTPATIENT/SAME DAY SURGERY:  *A responsible adult MUST accompany you at the time of discharge and stay with you for 24 hours after your surgery.  *You may NOT drive yourself home after surgery.  *You may use a taxi or ride sharing service ([employer], Uber) to return home ONLY if you are accompanied by a friend or family member.  *Instructions for resuming your medications will be provided by your surgeon.    Preoperative Fasting Guidelines     When do I need to stop eating before my surgery?  Do not eat any food after midnight the night before your surgery/procedure.    You may have up to 12 ounces of clear liquid until TWO hours before your instructed arrival time to the hospital.  This includes water, black tea/coffee, (no milk or cream) apple juice, and electrolyte drinks (Gatorade)  You may chew gum until TWO hours before your surgery/procedure    Preoperative Brain Exercises     What are brain exercises?  A brain exercise is any activity that engages your thinking (cognitive) skills.     What types of activities are considered brain exercises?  Jigsaw puzzles, crossword puzzles, word jumble, memory games, word search, and many more.  Many can be found free online or on your phone via a mobile ngoc.     Why should I do brain exercises before my surgery?  More recent research has shown brain exercise before surgery can lower the risk of postoperative delirium (confusion) which can be especially important for older adults.  Patients who did brain exercises for 5 to 10 hours the days before surgery, cut their risk of postoperative  delirium in half up to 1 week after surgery.                 [facility]   Preoperative Deep Breathing Exercises     Why it is important to do deep breathing exercises before my surgery?  Deep breathing exercises strengthen your breathing muscles.  This helps you to recover after your surgery and decreases the chance of breathing complications.        How are the deep breathing exercises done?  Sit straight with your back supported.  Breathe in deeply and slowly through your nose. Your lower rib cage should expand and your abdomen may move forward.  Hold that breath for 3 to 5 seconds.  Breathe out through pursed lips, slowly and completely.  Rest and repeat 10 times every hour while awake.  Rest longer if you become dizzy or lightheaded.                [facility]   Preoperative Deep Breathing Exercises     Why it is important to do deep breathing exercises before my surgery?  Deep breathing exercises strengthen your breathing muscles.  This helps you to recover after your surgery and decreases the chance of breathing complications.        How are the deep breathing exercises done?  Sit straight with your back supported.  Breathe in deeply and slowly through your nose. Your lower rib cage should expand and your abdomen may move forward.  Hold that breath for 3 to 5 seconds.  Breathe out through pursed lips, slowly and completely.  Rest and repeat 10 times every hour while awake.  Rest longer if you become dizzy or lightheaded.        Patient Information: Incentive Spirometer  What is an incentive spirometer?  An incentive spirometer is a device used before and after surgery to “exercise” your lungs.  It helps you to take deeper breaths to expand your lungs.  Below is an example of a basic incentive spirometer.  The device you receive may differ slightly but they all function the same.    Why do I need to use an incentive spirometer?  Using your incentive  spirometer prepares your lungs for surgery and helps prevent lung problems after surgery.  How do I use my incentive spirometer?  When you're using your incentive spirometer, make sure to breathe through your mouth. If you breathe through your nose, the incentive spirometer won't work properly. You can hold your nose if you have trouble.  If you feel dizzy at any time, stop and rest. Try again at a later time.  Follow the steps below:  Set up your incentive spirometer, expand the flexible tubing and connect to the outlet.  Sit upright in a chair or bed. Hold the incentive spirometer at eye level.   Put the mouthpiece in your mouth and close your lips tightly around it. Slowly breathe out (exhale) completely.  Breathe in (inhale) slowly through your mouth as deeply as you can. As you take a breath, you will see the piston rise inside the large column. While the piston rises, the indicator should move upwards. It should stay in between the 2 arrows (see Figure).  Try to get the piston as high as you can, while keeping the indicator between the arrows.   If the indicator doesn't stay between the arrows, you're breathing either too fast or too slow.  When you get it as high as you can, hold your breath for 10 seconds, or as long as possible. While you're holding your breath, the piston will slowly fall to the base of the spirometer.  Once the piston reaches the bottom of the spirometer, breathe out slowly through your mouth. Rest for a few seconds.  Repeat 10 times. Try to get the piston to the same level with each breath.  Repeat every hour while awake  You can carefully clean the outside of the mouthpiece with an alcohol wipe or soap and water.       Patient and Family Education        Ways You Can Help Prevent Blood Clots                 This handout explains some simple things you can do to help prevent blood clots.      Blood clots are blockages that can form in the body's veins. When a blood clot forms in your  deep veins, it may be called a deep vein thrombosis, or DVT for short. Blood clots can happen in any part of the body where blood flows, but they are most common in the arms and legs. If a piece of a blood clot breaks free and travels to the lungs, it is called a pulmonary embolus (PE). A PE can be a very serious problem.       Being in the hospital or having surgery can raise your chances of getting a blood clot because you may not be well enough to move around as much as you normally do.         Ways you can help prevent blood clots in the hospital           Wearing SCDs. SCDs stands for Sequential Compression Devices.   SCDs are special sleeves that wrap around your legs  They attach to a pump that fills them with air to gently squeeze your legs every few minutes.   This helps return the blood in your legs to your heart.   SCDs should only be taken off when walking or bathing.   SCDs may not be comfortable, but they can help save your life.                                            Wearing compression stockings - if your doctor orders them. These special snug fitting stockings gently squeeze your legs to help blood flow.       Walking. Walking helps move the blood in your legs.   If your doctor says it is ok, try walking the halls at least   5 times a day. Ask us to help you get up, so you don't fall.      Taking any blood thinning medicines your doctor orders.        Page 1 of 2            [facility]; 3/23   Ways you can help prevent blood clots at home         Wearing compression stockings - if your doctor orders them. ? Walking - to help move the blood in your legs.       Taking any blood thinning medicines your doctor orders.      Signs of a blood clot or PE        Tell your doctor or nurse know right away if you have of the problems listed below.    If you are at home, seek medical care right away. Call 911 for chest pain or problems breathing.                Signs of a blood clot (DVT) - such as  pain,  swelling, redness or warmth in your arm or leg      Signs of a pulmonary embolism (PE) - such as chest pain or feeling short of breath       Thank you for coming to Pre Admission testing.      If I have prescribe medication please don't forget to  at your pharmacy.      Any questions about today's visit call [phone] and leave a message in the general mailbox.     Patient instructed to ambulate as soon as possible postoperatively to decrease thromboembolic risk.     Hawa Padilla, APRN-CNP     Thank you for visiting [facility].  If you have any changes to your health condition, please call the surgeons office to alert them and give them details of your symptoms.     The morning of surgery is the fifth day.  Repeat the above steps and dress in clean comfortable clothing     Whom should I contact if I have any questions regarding the use of CHG soap?  Call the [facility] at [phone] if you have any questions.

## 2024-12-11 ENCOUNTER — TELEPHONE (OUTPATIENT)
Dept: ORTHOPEDIC SURGERY | Facility: CLINIC | Age: 69
End: 2024-12-11
Payer: MEDICARE

## 2024-12-11 LAB
ATRIAL RATE: 54 BPM
P AXIS: 70 DEGREES
P OFFSET: 190 MS
P ONSET: 131 MS
PR INTERVAL: 178 MS
Q ONSET: 220 MS
QRS COUNT: 8 BEATS
QRS DURATION: 84 MS
QT INTERVAL: 440 MS
QTC CALCULATION(BAZETT): 417 MS
QTC FREDERICIA: 424 MS
R AXIS: -13 DEGREES
T AXIS: 73 DEGREES
T OFFSET: 440 MS
VENTRICULAR RATE: 54 BPM

## 2024-12-11 NOTE — TELEPHONE ENCOUNTER
Patient called to cancel her surgery on 12/18/24. She said she will call back to reschedule. Thank you.

## 2024-12-16 ENCOUNTER — OFFICE VISIT (OUTPATIENT)
Dept: URGENT CARE | Age: 69
End: 2024-12-16
Payer: MEDICARE

## 2024-12-16 VITALS
SYSTOLIC BLOOD PRESSURE: 128 MMHG | HEART RATE: 46 BPM | TEMPERATURE: 98.4 F | OXYGEN SATURATION: 96 % | DIASTOLIC BLOOD PRESSURE: 74 MMHG | RESPIRATION RATE: 16 BRPM

## 2024-12-16 DIAGNOSIS — R39.9 UTI SYMPTOMS: ICD-10-CM

## 2024-12-16 DIAGNOSIS — N89.8 VAGINAL ITCHING: Primary | ICD-10-CM

## 2024-12-16 LAB
POC APPEARANCE, URINE: CLEAR
POC BILIRUBIN, URINE: NEGATIVE
POC BLOOD, URINE: NEGATIVE
POC COLOR, URINE: YELLOW
POC GLUCOSE, URINE: NEGATIVE MG/DL
POC KETONES, URINE: NEGATIVE MG/DL
POC LEUKOCYTES, URINE: NEGATIVE
POC NITRITE,URINE: NEGATIVE
POC PH, URINE: 6 PH
POC PROTEIN, URINE: NEGATIVE MG/DL
POC SPECIFIC GRAVITY, URINE: 1.02
POC UROBILINOGEN, URINE: 0.2 EU/DL

## 2024-12-16 PROCEDURE — 3078F DIAST BP <80 MM HG: CPT | Performed by: PHYSICIAN ASSISTANT

## 2024-12-16 PROCEDURE — 99214 OFFICE O/P EST MOD 30 MIN: CPT | Performed by: PHYSICIAN ASSISTANT

## 2024-12-16 PROCEDURE — 3074F SYST BP LT 130 MM HG: CPT | Performed by: PHYSICIAN ASSISTANT

## 2024-12-16 PROCEDURE — 1159F MED LIST DOCD IN RCRD: CPT | Performed by: PHYSICIAN ASSISTANT

## 2024-12-16 PROCEDURE — 1160F RVW MEDS BY RX/DR IN RCRD: CPT | Performed by: PHYSICIAN ASSISTANT

## 2024-12-16 PROCEDURE — 81003 URINALYSIS AUTO W/O SCOPE: CPT | Performed by: PHYSICIAN ASSISTANT

## 2024-12-16 PROCEDURE — 1123F ACP DISCUSS/DSCN MKR DOCD: CPT | Performed by: PHYSICIAN ASSISTANT

## 2024-12-16 PROCEDURE — 87086 URINE CULTURE/COLONY COUNT: CPT

## 2024-12-16 PROCEDURE — 1036F TOBACCO NON-USER: CPT | Performed by: PHYSICIAN ASSISTANT

## 2024-12-16 PROCEDURE — 87205 SMEAR GRAM STAIN: CPT

## 2024-12-16 RX ORDER — FLUCONAZOLE 150 MG/1
150 TABLET ORAL SEE ADMIN INSTRUCTIONS
Qty: 2 TABLET | Refills: 0 | Status: SHIPPED | OUTPATIENT
Start: 2024-12-16 | End: 2024-12-17

## 2024-12-16 NOTE — PROGRESS NOTES
Subjective   Patient ID: Ariana Wills is a 69 y.o. female. They present today with a chief complaint of Urinary Problem, UTI, and Over active bladder (PT PRESENTS WITH VAGINAL ITCHING AND POSSIBLE UTI.).    History of Present Illness  HPI  Presents with vaginal itching and discharge for several days. Has h/o OAB and recent incontinence. On oxybutynin. No urgency or frequency. No pelvic pain. Discharge clumpy. Post menopausal. Denies sexual activity in 5 years.     Past Medical History  Allergies as of 12/16/2024    (No Known Allergies)       (Not in a hospital admission)             Past Medical History:   Diagnosis Date    Abnormal weight gain 05/04/2016    Weight gain    Anemia     Anxiety     Arthritis     Candidiasis, unspecified 05/22/2015    Yeast infection    Cellulitis, unspecified 09/07/2014    Cellulitis    Chronic pain disorder     CKD (chronic kidney disease)     Colon polyp     Delayed emergence from general anesthesia     Depression     Diverticulosis     Dizziness     Encounter for screening for malignant neoplasm of cervix 05/22/2015    Pap smear for cervical cancer screening    Encounter for screening mammogram for malignant neoplasm of breast 05/22/2015    Visit for screening mammogram    Encounter for screening mammogram for malignant neoplasm of breast 05/22/2015    Visit for screening mammogram    Furuncle of buttock 09/18/2019    Boil, buttock    GERD (gastroesophageal reflux disease)     Hereditary and idiopathic neuropathy, unspecified 09/29/2013    Idiopathic peripheral neuropathy    Hypertension     Hypothyroid     Hypothyroidism     Liver disease     Lymphedema     Lymphedema, not elsewhere classified 05/22/2015    Lymphedema    BOYER (nonalcoholic steatohepatitis)     Obesity     Other symptoms and signs involving the musculoskeletal system 05/03/2016    Complaints of leg weakness    Pain in left hip 05/03/2016    Hip pain, acute, left    Personal history of other diseases of the  nervous system and sense organs     History of peripheral neuropathy    Personal history of other diseases of the respiratory system 04/10/2015    History of acute bronchitis    Personal history of other diseases of the respiratory system 06/22/2015    History of sinusitis    Personal history of other specified conditions 06/28/2016    History of dizziness    Personal history of other specified conditions 05/22/2015    History of chest pain    Sleep apnea     Spinal stenosis     Unspecified asthma with (acute) exacerbation ([facility]) 05/03/2016    Asthma exacerbation    Vertigo     Weakness of trunk musculature 11/01/2023       Past Surgical History:   Procedure Laterality Date    BLEPHAROPLASTY      CARDIAC CATHETERIZATION  04/03/2023    CARPAL TUNNEL RELEASE  09/27/2013    Neuroplasty Decompression Median Nerve At Carpal Tunnel    CHOLECYSTECTOMY  09/27/2013    Cholecystectomy    COLONOSCOPY      DILATION AND CURETTAGE OF UTERUS          reports that she has never smoked. She has never used smokeless tobacco. She reports current alcohol use. She reports that she does not use drugs.    Review of Systems  Review of Systems   Review of systems: A complete review of systems was done, and is as stated in the history of present illness, is otherwise negative or not pertinent to the complaint.       Objective    Vitals:    12/16/24 0904   BP: 128/74   BP Location: Right arm   Patient Position: Sitting   Pulse: (!) 46   Resp: 16   Temp: 36.9 °C (98.4 °F)   SpO2: 96%     No LMP recorded (lmp unknown). Patient is postmenopausal.    Physical Exam  NAD. Well appearing    MMM   PERRLA   no icterus   TMs clear bilat   Oropharynx clear of exudate or tonsillar swelling/exudate   neck supple. WYATT. No LAD   no resp distress. Lungs CTAB without w/r/r   RRR. No m/r/g   Abd; Soft. NTTP   ; pt decline exam   RUBIN x 4. MS 5/5   Skin; warm without rashes   pulses 2+ throughout   neuro intact with normal sensation and motor   psych a&o x  3       Procedures    Point of Care Test & Imaging Results from this visit      Assessment/Plan   Allergies, medications, history, and pertinent labs/EKGs/Imaging reviewed by Ronald Johnston PA-C.     Medical Decision Making  -pt declined vaginal exam  -yeast / bv swab sent  -empiric diflucan  -udip neg  -fu culture  -has fu with urology this week   All ?s answered     Orders and Diagnoses  Diagnoses and all orders for this visit:  Vaginal itching  -     POCT UA Automated manually resulted  -     Vaginitis Gram Stain For Bacterial Vaginosis + Yeast  UTI symptoms  -     Urine Culture

## 2024-12-17 LAB
BACTERIA UR CULT: NORMAL
CLUE CELLS VAG LPF-#/AREA: NORMAL /[LPF]
NUGENT SCORE: 0
YEAST VAG WET PREP-#/AREA: NORMAL

## 2024-12-18 NOTE — PROGRESS NOTES
Subjective   Patient ID: Ariana Wills is a 69 y.o. female    HPI  69 y.o. female who presenting for 6 month follow-up visit with a worsening bladder issue over the past year. She reports that every time she stands up, she experiences a sudden urge to urinate, often resulting in leakage. This urgency has been present for a while but has significantly worsened recently. She mentions that she does not wet the bed at night unless she tries to stand up. The patient has tried Kegel exercises without success. She denies recurrent urinary tract infections (UTIs) and has had only one bladder infection in the past. She has no history of hematuria. The patient has a history of arthritis, which has also worsened over the past year. She grew up with parents who smoked but does not currently smoke herself.    She reports taking oxybutynin morning and night. She expresses it helping in the beginning, however reports it is no longer helping with her symptoms. She reports during her pre op for a orthopedic surgery on 12/18/2024, it was revealed she has some cardiac problems which caused them to reschedule surgery until clearance from cardiology. She does drink caffeine.       The most recent Urine Culture, conducted on 12/16/2024, revealed:  Clinically insignificant growth based on current clinical standards.      Review of Systems    All systems were reviewed. Anything negative was noted in the HPI.    Objective   Physical Exam    General: Well developed, well nourished, alert and cooperative, appears in no acute distress   Eyes: Non-injected conjunctiva, sclera clear, no proptosis   Cardiac: Extremities are warm and well perfused. No edema, cyanosis or pallor   Lungs: Breathing is easy, non-labored. Speaking in clear and complete sentences. Normal diaphragmatic movement   MSK: Ambulatory with steady gait, unassisted   Neuro: Alert and oriented to person, place, and time   Psych: Demonstrates good judgment and reason, without  hallucinations, abnormal affect or abnormal behaviors   Skin: No obvious lesions, no rashes       No CVA tenderness bilaterally   No suprapubic pain or discomfort       Past Medical History:   Diagnosis Date    Abnormal weight gain 05/04/2016    Weight gain    Anemia     Anxiety     Arthritis     Candidiasis, unspecified 05/22/2015    Yeast infection    Cellulitis, unspecified 09/07/2014    Cellulitis    Chronic pain disorder     CKD (chronic kidney disease)     Colon polyp     Delayed emergence from general anesthesia     Depression     Diverticulosis     Dizziness     Encounter for screening for malignant neoplasm of cervix 05/22/2015    Pap smear for cervical cancer screening    Encounter for screening mammogram for malignant neoplasm of breast 05/22/2015    Visit for screening mammogram    Encounter for screening mammogram for malignant neoplasm of breast 05/22/2015    Visit for screening mammogram    Furuncle of buttock 09/18/2019    Boil, buttock    GERD (gastroesophageal reflux disease)     Hereditary and idiopathic neuropathy, unspecified 09/29/2013    Idiopathic peripheral neuropathy    Hypertension     Hypothyroid     Hypothyroidism     Liver disease     Lymphedema     Lymphedema, not elsewhere classified 05/22/2015    Lymphedema    BOYER (nonalcoholic steatohepatitis)     Obesity     Other symptoms and signs involving the musculoskeletal system 05/03/2016    Complaints of leg weakness    Pain in left hip 05/03/2016    Hip pain, acute, left    Personal history of other diseases of the nervous system and sense organs     History of peripheral neuropathy    Personal history of other diseases of the respiratory system 04/10/2015    History of acute bronchitis    Personal history of other diseases of the respiratory system 06/22/2015    History of sinusitis    Personal history of other specified conditions 06/28/2016    History of dizziness    Personal history of other specified conditions 05/22/2015    History  of chest pain    Sleep apnea     Spinal stenosis     Unspecified asthma with (acute) exacerbation ([facility]) 05/03/2016    Asthma exacerbation    Vertigo     Weakness of trunk musculature 11/01/2023         Past Surgical History:   Procedure Laterality Date    BLEPHAROPLASTY      CARDIAC CATHETERIZATION  04/03/2023    CARPAL TUNNEL RELEASE  09/27/2013    Neuroplasty Decompression Median Nerve At Carpal Tunnel    CHOLECYSTECTOMY  09/27/2013    Cholecystectomy    COLONOSCOPY      DILATION AND CURETTAGE OF UTERUS           Assessment/Plan   Overactive bladder (OAB), Urinary incontinence, likely stress incontinence    69 y.o. female who presents for the above condition, We had a very long and extensive discussion with the patient regarding the pathophysiology, differential diagnosis, risk factor, management, natural history, incidence and diagnostic work-up of the condition.      - Advised pt to discontinue oxybutynin    Plan:  - Follow-up in 6 months   - Prescribed Myrbetriq 50 mg tablets once daily      E&M visit today is associated with current or anticipated ongoing medical care services related to a patient's single, serious condition or a complex condition.     12/19/2024    Scribe Attestation  By signing my name below, IMatilde Scribe attest that this documentation has been prepared under the direction and in the presence of Dr. Perez Wild.

## 2024-12-19 ENCOUNTER — APPOINTMENT (OUTPATIENT)
Dept: UROLOGY | Facility: HOSPITAL | Age: 69
End: 2024-12-19
Payer: MEDICARE

## 2024-12-19 DIAGNOSIS — N32.81 OAB (OVERACTIVE BLADDER): Primary | ICD-10-CM

## 2024-12-19 PROCEDURE — G2211 COMPLEX E/M VISIT ADD ON: HCPCS | Performed by: STUDENT IN AN ORGANIZED HEALTH CARE EDUCATION/TRAINING PROGRAM

## 2024-12-19 PROCEDURE — 1123F ACP DISCUSS/DSCN MKR DOCD: CPT | Performed by: STUDENT IN AN ORGANIZED HEALTH CARE EDUCATION/TRAINING PROGRAM

## 2024-12-19 PROCEDURE — 99214 OFFICE O/P EST MOD 30 MIN: CPT | Performed by: STUDENT IN AN ORGANIZED HEALTH CARE EDUCATION/TRAINING PROGRAM

## 2024-12-19 PROCEDURE — 1159F MED LIST DOCD IN RCRD: CPT | Performed by: STUDENT IN AN ORGANIZED HEALTH CARE EDUCATION/TRAINING PROGRAM

## 2024-12-19 RX ORDER — MIRABEGRON 50 MG/1
50 TABLET, FILM COATED, EXTENDED RELEASE ORAL DAILY
Qty: 30 TABLET | Refills: 3 | Status: SHIPPED | OUTPATIENT
Start: 2024-12-19 | End: 2025-04-18

## 2025-01-29 DIAGNOSIS — N39.3 STRESS INCONTINENCE: ICD-10-CM

## 2025-01-30 ENCOUNTER — HOSPITAL ENCOUNTER (EMERGENCY)
Facility: HOSPITAL | Age: 70
Discharge: HOME | End: 2025-01-30
Payer: MEDICARE

## 2025-01-30 VITALS
DIASTOLIC BLOOD PRESSURE: 58 MMHG | TEMPERATURE: 98.7 F | BODY MASS INDEX: 50.02 KG/M2 | OXYGEN SATURATION: 96 % | HEIGHT: 64 IN | SYSTOLIC BLOOD PRESSURE: 112 MMHG | RESPIRATION RATE: 18 BRPM | WEIGHT: 293 LBS | HEART RATE: 67 BPM

## 2025-01-30 DIAGNOSIS — L29.9 ITCHING: Primary | ICD-10-CM

## 2025-01-30 DIAGNOSIS — R21 RASH: ICD-10-CM

## 2025-01-30 PROCEDURE — 99282 EMERGENCY DEPT VISIT SF MDM: CPT

## 2025-01-30 PROCEDURE — 99283 EMERGENCY DEPT VISIT LOW MDM: CPT

## 2025-01-30 RX ORDER — DIPHENHYDRAMINE HCL 25 MG
25 CAPSULE ORAL EVERY 8 HOURS PRN
Qty: 15 CAPSULE | Refills: 0 | Status: SHIPPED | OUTPATIENT
Start: 2025-01-30 | End: 2025-01-31 | Stop reason: WASHOUT

## 2025-01-30 RX ORDER — FAMOTIDINE 20 MG/1
20 TABLET, FILM COATED ORAL 2 TIMES DAILY
Qty: 10 TABLET | Refills: 0 | Status: SHIPPED | OUTPATIENT
Start: 2025-01-30 | End: 2025-02-04

## 2025-01-30 RX ORDER — PREDNISONE 20 MG/1
60 TABLET ORAL DAILY
Qty: 9 TABLET | Refills: 0 | Status: SHIPPED | OUTPATIENT
Start: 2025-01-30 | End: 2025-02-02

## 2025-01-30 ASSESSMENT — PAIN - FUNCTIONAL ASSESSMENT: PAIN_FUNCTIONAL_ASSESSMENT: 0-10

## 2025-01-30 ASSESSMENT — PAIN SCALES - GENERAL: PAINLEVEL_OUTOF10: 0 - NO PAIN

## 2025-01-30 NOTE — DISCHARGE INSTRUCTIONS
Please return to the ED if you have new or worsening signs or symptoms  Please follow up with your pcp within 3 days

## 2025-01-30 NOTE — ED TRIAGE NOTES
PT is A/Ox4 coming in for hive/rash/itchiness after getting a flu/pneumonia vaccine yesterday. PT stated she has hives on her  legs and a headache.

## 2025-01-30 NOTE — ED PROVIDER NOTES
HPI   Chief Complaint   Patient presents with    Hives       Patient is a 69 year old female with PMHx of lymphedema, asthma, OAB presenting today for possible allergic reaction. Patient states she received Influenza and Pneumococcal conjugate vaccination yesterday at noon. At 8pm, patient started to have diffuse pruritis, localized to areas of tight clothing and ears. Patient woke up this morning and rash remained persistent with new mild headache. Patient denies difficulty breathing, swelling of the mouth, SOB, or chest pain. Patient states she has not used any new detergents or skin products. No new food exposures. Patient did start Mirabegron about 6 weeks ago. Denies allergies to medications and/or adverse reactions to vaccinations. Patient took Flonase last night with no improvement of symptoms.  She denies any fevers.  She denies any nausea or vomiting.  She denies any vision changes or numbness or tingling or weakness.  The headache is not the worst headache of her life.  The headache was not thunderclap in onset.  She does get chronic headaches and feels as if this is similar to her previous headaches.  Is not anticoagulated.  No recent trauma or injury.  She has not taken any medications for this.  She denies any neck pain or stiffness.  She denies any rash at the injection site.  Is up-to-date on vaccinations.  No sore throat. She denies any rash in the palms and soles or in the oral cavity.  Denies any blistering.  She has no other symptoms or concerns at this time.      History provided by:  Patient   used: No            Patient History   Past Medical History:   Diagnosis Date    Abnormal weight gain 05/04/2016    Weight gain    Anemia     Anxiety     Arthritis     Candidiasis, unspecified 05/22/2015    Yeast infection    Cellulitis, unspecified 09/07/2014    Cellulitis    Chronic pain disorder     CKD (chronic kidney disease)     Colon polyp     Delayed emergence from general  anesthesia     Depression     Diverticulosis     Dizziness     Encounter for screening for malignant neoplasm of cervix 05/22/2015    Pap smear for cervical cancer screening    Encounter for screening mammogram for malignant neoplasm of breast 05/22/2015    Visit for screening mammogram    Encounter for screening mammogram for malignant neoplasm of breast 05/22/2015    Visit for screening mammogram    Furuncle of buttock 09/18/2019    Boil, buttock    GERD (gastroesophageal reflux disease)     Hereditary and idiopathic neuropathy, unspecified 09/29/2013    Idiopathic peripheral neuropathy    Hypertension     Hypothyroid     Hypothyroidism     Liver disease     Lymphedema     Lymphedema, not elsewhere classified 05/22/2015    Lymphedema    BOYER (nonalcoholic steatohepatitis)     Obesity     Other symptoms and signs involving the musculoskeletal system 05/03/2016    Complaints of leg weakness    Pain in left hip 05/03/2016    Hip pain, acute, left    Personal history of other diseases of the nervous system and sense organs     History of peripheral neuropathy    Personal history of other diseases of the respiratory system 04/10/2015    History of acute bronchitis    Personal history of other diseases of the respiratory system 06/22/2015    History of sinusitis    Personal history of other specified conditions 06/28/2016    History of dizziness    Personal history of other specified conditions 05/22/2015    History of chest pain    Sleep apnea     Spinal stenosis     Unspecified asthma with (acute) exacerbation ([facility]) 05/03/2016    Asthma exacerbation    Vertigo     Weakness of trunk musculature 11/01/2023     Past Surgical History:   Procedure Laterality Date    BLEPHAROPLASTY      CARDIAC CATHETERIZATION  04/03/2023    CARPAL TUNNEL RELEASE  09/27/2013    Neuroplasty Decompression Median Nerve At Carpal Tunnel    CHOLECYSTECTOMY  09/27/2013    Cholecystectomy    COLONOSCOPY      DILATION AND CURETTAGE OF UTERUS        Family History   Problem Relation Name Age of Onset    Colon cancer Mother      Other (cardiac disorder) Mother      Heart failure Father      Other (INTERNAL BLEEDING) Brother      Asthma Other      Heart disease Other      Hypertension Other      Sleep disorder Other      Arthritis Other      Other (hay fever) Other       Social History     Tobacco Use    Smoking status: Never    Smokeless tobacco: Never   Vaping Use    Vaping status: Never Used   Substance Use Topics    Alcohol use: Yes     Comment: 4-5 a year    Drug use: Never       Physical Exam   ED Triage Vitals   Temperature Heart Rate Respirations BP   01/30/25 1040 01/30/25 1040 01/30/25 1119 01/30/25 1040   37.1 °C (98.7 °F) 67 18 112/58      Pulse Ox Temp src Heart Rate Source Patient Position   01/30/25 1040 -- -- --   96 %         BP Location FiO2 (%)     -- --             Physical Exam  Gen.: Vitals noted no distress afebrile. Normal phonation, no stridor, no trismus. Patient is handling secretions well without any tripod positioning or drooling.  ENT: TMs clear bilaterally, EACs unremarkable. Mastoids nontender. Posterior oropharynx without erythema, exudate, or swelling. Uvula is in the midline and nonedematous. No Carmine's Angina.   Neck: Supple. No meningismus through full range of motion. No lymphadenopathy.   Cardiac: Regular rate rhythm no murmur.   Lungs: Good aeration throughout. No adventitious breath sounds.   Abdomen: Soft nontender nonsurgical throughout  Extremities: No peripheral edema. extremities are moist with good skin turgor.  Skin: Maculopapular rash located on bilateral torso, neck, and lower extremities.  The rash is blanchable.  Rash noted at waistline as well.  No rash on Palms and soles.  No rash in the oral cavity.  No rash on mucous membranes.  There is no petechia or purpura.  No vesicles or bullae.  Neuro: No focal neurologic deficits. cranial nerves II-XII grossly intact.       ED Course & MDM   Diagnoses as of  01/30/25 1137   Itching   Rash                 No data recorded                                 Medical Decision Making  Patient is a 69 year old female with PMHx of lymphedema, asthma, OAB presenting today for possible allergic reaction.  Vital signs reassuring.  Patient overall appears well and is nontoxic-appearing.  Patient has full range of motion of the neck without meningismus.  Satting well on room air.  Not hypoxic.  Not tachycardic.  Afebrile.  No zoster rash on exam.  No signs of anaphylaxis.  No rash in the palms and soles.  No petechiae purpura.  No vesicles or bullae.  No signs of Carter-Les syndrome or toxic epidermal chalasis.  No signs of SSSS.  There is no sloughing of the skin.  No systemic signs or symptoms.  The rash does not appear to be meningitic.  Does not appear to look like group A strep.  Do not think EpiPen is indicated at this time.  Does not appear to look like pityriasis rosea.  The  IM injection sites on her bilateral upper extremities are without any rash.  Will treat with prednisone, Benadryl, and famotidine outpatient.  Discussed uses and possible side effects of these medications.  Discussed my pression and findings with patient she feels comfortable returning home.  We discussed very strict return precautions including returning for any new or worsening signs or symptoms.  Patient is in agreement with this plan.  She will follow-up with her PCP within 3 days.    Differential: Allergic reaction, Atopic Dermatitis, Anaphylaxis, Angioedema, Asthma Exacerbation, SJS/TENS, Medication Allergy, Bullous Pemphigus,scarlet fever, zoster, pityriasis rosea    Disposition/treatment  1.  See diagnosis    Shared decision-making was used patient feels comfortable returning home     Patient was advised to follow up with recommended provider in 1 day for another evaluation and exam. I advised patient/guardian to return or go to closest emergency room immediately if symptoms change, get  worse, new symptoms develop prior to follow up. If there is no improvement in symptoms in the next 24 hours they are advised to return for further evaluation and exam. I also explained the plan and treatment course. Patient/guardian is in agreement with plan, treatment course, and follow up and states verbally that they will comply.    Patient is homegoing. I discussed the differential; results and discharge plan with the patient and/or family/friend/caregiver if present.  I emphasized the importance of follow-up with the physician I referred them to in the timeframe recommended.  I explained reasons for the patient to return to the Emergency Department. They agreed that if they feel their condition is worsening or if they have any other concern they should call 911 immediately for further assistance. I gave the patient an opportunity to ask all questions they had and answered all of them accordingly. They understand return precautions and discharge instructions. The patient and/or family/friend/caregiver expressed understanding verbally and that they would comply.        This note has been transcribed using voice recognition and may contain grammatical errors, misplaced words, incorrect words, incorrect phrases or other errors.        Procedure  Procedures     Leif Silverman PA-C  01/30/25 1516

## 2025-01-31 ENCOUNTER — HOSPITAL ENCOUNTER (EMERGENCY)
Facility: HOSPITAL | Age: 70
Discharge: HOME | End: 2025-01-31
Attending: EMERGENCY MEDICINE
Payer: MEDICARE

## 2025-01-31 VITALS
TEMPERATURE: 98.1 F | WEIGHT: 293 LBS | RESPIRATION RATE: 18 BRPM | OXYGEN SATURATION: 99 % | BODY MASS INDEX: 50.02 KG/M2 | HEART RATE: 63 BPM | DIASTOLIC BLOOD PRESSURE: 63 MMHG | SYSTOLIC BLOOD PRESSURE: 154 MMHG | HEIGHT: 64 IN

## 2025-01-31 DIAGNOSIS — L50.9 URTICARIA: Primary | ICD-10-CM

## 2025-01-31 PROCEDURE — 2500000001 HC RX 250 WO HCPCS SELF ADMINISTERED DRUGS (ALT 637 FOR MEDICARE OP): Performed by: EMERGENCY MEDICINE

## 2025-01-31 PROCEDURE — 99282 EMERGENCY DEPT VISIT SF MDM: CPT | Performed by: EMERGENCY MEDICINE

## 2025-01-31 RX ORDER — DIPHENHYDRAMINE HCL 25 MG
50 TABLET ORAL EVERY 8 HOURS PRN
Qty: 20 TABLET | Refills: 0 | Status: SHIPPED | OUTPATIENT
Start: 2025-01-31

## 2025-01-31 RX ORDER — DIPHENHYDRAMINE HCL 25 MG
50 CAPSULE ORAL ONCE
Status: COMPLETED | OUTPATIENT
Start: 2025-01-31 | End: 2025-01-31

## 2025-01-31 RX ADMIN — DIPHENHYDRAMINE HYDROCHLORIDE 50 MG: 25 CAPSULE ORAL at 20:55

## 2025-01-31 ASSESSMENT — PAIN SCALES - GENERAL: PAINLEVEL_OUTOF10: 0 - NO PAIN

## 2025-01-31 ASSESSMENT — COLUMBIA-SUICIDE SEVERITY RATING SCALE - C-SSRS
1. IN THE PAST MONTH, HAVE YOU WISHED YOU WERE DEAD OR WISHED YOU COULD GO TO SLEEP AND NOT WAKE UP?: NO
2. HAVE YOU ACTUALLY HAD ANY THOUGHTS OF KILLING YOURSELF?: NO
6. HAVE YOU EVER DONE ANYTHING, STARTED TO DO ANYTHING, OR PREPARED TO DO ANYTHING TO END YOUR LIFE?: NO

## 2025-01-31 ASSESSMENT — PAIN - FUNCTIONAL ASSESSMENT: PAIN_FUNCTIONAL_ASSESSMENT: 0-10

## 2025-02-01 NOTE — ED PROVIDER NOTES
HPI   Chief Complaint   Patient presents with    Allergic Reaction       The patient presents with continued hives.  The patient was seen yesterday and discharged home with Benadryl and steroids.  She continues to have urticaria and itching.  She states that this was an hour or 2 after she received the flu and pneumonia vaccine.  She states that she has had both in the past before.  She denies any other new exposures.  Denies any new trouble breathing, oropharyngeal swelling, vomiting abdominal pain or other symptoms.              Patient History   Past Medical History:   Diagnosis Date    Abnormal weight gain 05/04/2016    Weight gain    Anemia     Anxiety     Arthritis     Candidiasis, unspecified 05/22/2015    Yeast infection    Cellulitis, unspecified 09/07/2014    Cellulitis    Chronic pain disorder     CKD (chronic kidney disease)     Colon polyp     Delayed emergence from general anesthesia     Depression     Diverticulosis     Dizziness     Encounter for screening for malignant neoplasm of cervix 05/22/2015    Pap smear for cervical cancer screening    Encounter for screening mammogram for malignant neoplasm of breast 05/22/2015    Visit for screening mammogram    Encounter for screening mammogram for malignant neoplasm of breast 05/22/2015    Visit for screening mammogram    Furuncle of buttock 09/18/2019    Boil, buttock    GERD (gastroesophageal reflux disease)     Hereditary and idiopathic neuropathy, unspecified 09/29/2013    Idiopathic peripheral neuropathy    Hypertension     Hypothyroid     Hypothyroidism     Liver disease     Lymphedema     Lymphedema, not elsewhere classified 05/22/2015    Lymphedema    BOYER (nonalcoholic steatohepatitis)     Obesity     Other symptoms and signs involving the musculoskeletal system 05/03/2016    Complaints of leg weakness    Pain in left hip 05/03/2016    Hip pain, acute, left    Personal history of other diseases of the nervous system and sense organs     History  of peripheral neuropathy    Personal history of other diseases of the respiratory system 04/10/2015    History of acute bronchitis    Personal history of other diseases of the respiratory system 06/22/2015    History of sinusitis    Personal history of other specified conditions 06/28/2016    History of dizziness    Personal history of other specified conditions 05/22/2015    History of chest pain    Sleep apnea     Spinal stenosis     Unspecified asthma with (acute) exacerbation ([facility]) 05/03/2016    Asthma exacerbation    Vertigo     Weakness of trunk musculature 11/01/2023     Past Surgical History:   Procedure Laterality Date    BLEPHAROPLASTY      CARDIAC CATHETERIZATION  04/03/2023    CARPAL TUNNEL RELEASE  09/27/2013    Neuroplasty Decompression Median Nerve At Carpal Tunnel    CHOLECYSTECTOMY  09/27/2013    Cholecystectomy    COLONOSCOPY      DILATION AND CURETTAGE OF UTERUS       Family History   Problem Relation Name Age of Onset    Colon cancer Mother      Other (cardiac disorder) Mother      Heart failure Father      Other (INTERNAL BLEEDING) Brother      Asthma Other      Heart disease Other      Hypertension Other      Sleep disorder Other      Arthritis Other      Other (hay fever) Other       Social History     Tobacco Use    Smoking status: Never    Smokeless tobacco: Never   Vaping Use    Vaping status: Never Used   Substance Use Topics    Alcohol use: Yes     Comment: 4-5 a year    Drug use: Never       Physical Exam   ED Triage Vitals [01/31/25 2011]   Temperature Heart Rate Respirations BP   36.7 °C (98.1 °F) 63 18 154/63      Pulse Ox Temp Source Heart Rate Source Patient Position   99 % Oral Monitor --      BP Location FiO2 (%)     -- --       Physical Exam  Vitals and nursing note reviewed.   Constitutional:       General: She is not in acute distress.     Appearance: She is well-developed.   HENT:      Head: Normocephalic and atraumatic.   Eyes:      Conjunctiva/sclera: Conjunctivae  normal.   Cardiovascular:      Rate and Rhythm: Normal rate and regular rhythm.      Heart sounds: No murmur heard.  Pulmonary:      Effort: Pulmonary effort is normal. No respiratory distress.      Breath sounds: Normal breath sounds.   Abdominal:      Palpations: Abdomen is soft.      Tenderness: There is no abdominal tenderness.   Musculoskeletal:         General: No swelling.      Cervical back: Neck supple.   Skin:     General: Skin is warm and dry.      Capillary Refill: Capillary refill takes less than 2 seconds.      Findings: Rash present.      Comments: Raised erythematous rash across the antecubital fossa bilaterally as well as the abdomen consistent with urticaria   Neurological:      Mental Status: She is alert.   Psychiatric:         Mood and Affect: Mood normal.           ED Course & MDM   Diagnoses as of 02/01/25 0353   Urticaria                 No data recorded     West Coma Scale Score: 15 (01/31/25 2057 : Lina Hannah RN)                           Medical Decision Making  The patient has urticaria without any other signs of anaphylaxis.  Feel the patient did need to have expectations adjusted that hives can sometimes last several days.  Do feel that she could increase her dose of Benadryl and Pepcid.  Return precautions given.  Maximum doses of Benadryl listed.    Procedure  Procedures     Jose Daniel Blue MD  02/01/25 0355

## 2025-02-01 NOTE — ED TRIAGE NOTES
Pt was seen on 1/30 after a vaccination for flu and pna for allergic reaction. Pt sts itching has not gotten any better with benadryl or steroids. Pt reports worsening s/sx while dressed and under sheets; however has not changed laundry detergents.

## 2025-02-01 NOTE — DISCHARGE INSTRUCTIONS
Call your allergy doctor and your dermatologist if not improved by Monday.  Take no more than 150 mg of Benadryl (diphenhydramine) in a 24-hour period.  You can take 40 mg of Pepcid (famotidine) this week as well.

## 2025-02-03 ENCOUNTER — TELEPHONE (OUTPATIENT)
Dept: PRIMARY CARE | Facility: CLINIC | Age: 70
End: 2025-02-03
Payer: MEDICARE

## 2025-02-03 RX ORDER — OXYBUTYNIN CHLORIDE 5 MG/1
5 TABLET ORAL 2 TIMES DAILY
Qty: 180 TABLET | Refills: 1 | Status: SHIPPED | OUTPATIENT
Start: 2025-02-03

## 2025-02-03 NOTE — TELEPHONE ENCOUNTER
Mague, could you check phone message to see when she actually called us? I see that she did go back to ER second day, and they took care of her.

## 2025-02-03 NOTE — TELEPHONE ENCOUNTER
PT STATES THAT SHE GOT A PREVNAR AND FLU VACCINE WED AND IT GAVE HER A RASH. SHE STATES THAT SHE WENT ER AND THEY GAVE HER SOME MED AND NOW THE RASH IS WORST AND IT ITCHES

## 2025-02-20 ENCOUNTER — OFFICE VISIT (OUTPATIENT)
Facility: HOSPITAL | Age: 70
End: 2025-02-20
Payer: MEDICARE

## 2025-02-20 VITALS — RESPIRATION RATE: 20 BRPM | HEART RATE: 71 BPM | SYSTOLIC BLOOD PRESSURE: 136 MMHG | DIASTOLIC BLOOD PRESSURE: 75 MMHG

## 2025-02-20 DIAGNOSIS — I89.0 LYMPHEDEMA: Primary | ICD-10-CM

## 2025-02-20 PROCEDURE — 3078F DIAST BP <80 MM HG: CPT | Performed by: PHYSICAL MEDICINE & REHABILITATION

## 2025-02-20 PROCEDURE — 99214 OFFICE O/P EST MOD 30 MIN: CPT | Performed by: PHYSICAL MEDICINE & REHABILITATION

## 2025-02-20 PROCEDURE — G2211 COMPLEX E/M VISIT ADD ON: HCPCS | Performed by: PHYSICAL MEDICINE & REHABILITATION

## 2025-02-20 PROCEDURE — 1159F MED LIST DOCD IN RCRD: CPT | Performed by: PHYSICAL MEDICINE & REHABILITATION

## 2025-02-20 PROCEDURE — 3075F SYST BP GE 130 - 139MM HG: CPT | Performed by: PHYSICAL MEDICINE & REHABILITATION

## 2025-02-20 PROCEDURE — 1125F AMNT PAIN NOTED PAIN PRSNT: CPT | Performed by: PHYSICAL MEDICINE & REHABILITATION

## 2025-02-20 PROCEDURE — 1036F TOBACCO NON-USER: CPT | Performed by: PHYSICAL MEDICINE & REHABILITATION

## 2025-02-20 PROCEDURE — 1123F ACP DISCUSS/DSCN MKR DOCD: CPT | Performed by: PHYSICAL MEDICINE & REHABILITATION

## 2025-02-20 ASSESSMENT — PAIN SCALES - GENERAL: PAINLEVEL_OUTOF10: 2

## 2025-02-20 NOTE — Clinical Note
"Hi Dr. Garza, I wanted to see if you can see this patient in fu sooner vs later, she had a few epsiodes of \"passing out\" and having dizziness/lightheadedness in the last few months, did fall a few times. Doesn't sound like vertigo. I did tell her for now to try and cut her hydrochlorothiazide in half until she sees you and/or cardio.  Thank you. Rosette"

## 2025-02-20 NOTE — PROGRESS NOTES
ref by Dr. Garza for back and leg pain and weakness.      History of Present IllnessPhysical Medicine and Rehabilitation [facility]     Chief Complaint:  Low back pain     HPI:  Ms. Wills is a 69 yo F w pmh of obesity, lymphedema, lipedema, hypothyroidism, gerd presenting with back and hip pain.     Recall  Onset: progressively worse in last 15 years  Location: across low back   Radiation: bilateral, worse on R, R lateral leg numbness ad in the calf  Quality: sharp  Duration: comes and goes  Aggravating factors: standing and walking, standing is worse  walking causes cramping in the legs and across the back  Uses a cane   Alleviating factors: laying down  Severity: [5/10] today, [10/10] most severe  Numbness/Tingling: yes idiopathic neuropathy in feet and hands  Bowel or bladder incontinence: yes overactive, bowel depends on food  Pt's current medication regimen includes: advil prn helps but cant take it renal insufficiency  tylen w some relief but less than advil     Treatment to date:  Physical therapy: none  Medications taken to date for this complaint include the following:   as above  Prior injections: 15 years ago not sure   ? nerve blocks      She was last seen in June 2023. at that time we discussed:  Back pain; likely spinal stenosis and facet arthropathy  - xr L spine, xr hips  - PT for core rom hep Le strengthening  - discussing swimming for exercise  - gabapentin 300 mg bid titration explained, renal dosing  - no nsaids given renal insufficiency    Limpholipedema  - Lymphedema therapy; would need custom garments, pump  - tsh, bmp wnl except renal insufficiency    Had lymphedema eval and now pending fu appts.  pain in low back on the right, worse w standing in one spot needs to lean forward   is better.  Gabapentin is helping sleep at night.     Gabapentin 300 mg bid not sure re back,.    Received jaycee pants Sigvaris 10-15 mmhgh. Waiting for velcro wraps.   Waiting for pump.  States ankles are dowb but the  front of the shins., Feet are down. Thighs fluctuate.  Started some exercises, takes 24-48 hrs to recover, while she is doing them its ok.      She still goes to lymphedema therapy once a week. She has biker shorts. Velcro garments for below the knees and wears them during the day and then pending thigh velcro.   She has a pump at home does once a day. States pump hurts the knees.  Pain in both knees in doing back therapy.  L spine is much better. More strengthen walking.  Knee pain anterior L on the R general, pain w straightening. Takes tylenol and alternates ibuprofen.  Lidocaine patches takes the edge off.  Continues w gabapentin bid.        Just had knee injections; a little better some times pain still shoots. Overall some improvement.  Completed therapy water in end of Dec. Joined silver sneakers,. She is walking more.   Legs are stronger. Water therapy for back and knees. Overall feels better. R hip intermittent pain.  When gets up at night when first bear weight on it.   Has compression knees highs during the day, jobst 20-30 mmhg.  Doing pump once a day.  She was doing 45 mmhg. Might go to 50 mmhg. Sleeps in velcro wraps. Wears thigh pieces in the top.  Continues w gabapentin bid.      Numbness in the feet today. Was dx before w neuropathy in hands and feet. Idiopathic. Comes and goes.       Blood work wnl. She is doing her garments and exercises., Knee pain severe and uses a cane, doing pump.  Tens until helps. Has not found outside water therapy yet,        Her knees feel ok. Wants to wait on gel. She saw ortho surgery, not surgical candidate, focus re weight loss.  Had repeated EMG shows severe cts L and L ulnar neuropathy. Follows w neuro will see ortho hand.  Overall lymphedema is stable. Legs feel stable.   She is doing pump daily. Wears her knee highs and jaycee compression pants.   Occasionally uses velcro wrap.  Bladder stress incontinence. Started on oxybutynin as per urology.   Sometimes has  orthostatic hypotention. Had a fall one week ago, knees gave out.  Continues on ifeoma and duloxetine,.    She was last seen August 2024. At that time we discussed:    Back pain; likely spinal stenosis and facet arthropathy  - xr L spine reviewed personally shows L5/s1 degenerative disc disease,  xr hips w mild oa.  reviewed w patient and on personal review mild hip oa and extensive spondylosis  - completed water therapy for core rom hep Le strengthening; initally had improvement but now worsening  - referral MRI L spine  - discussing swimming for exercise  - gabapentin 300 mg bid  renal fx borderline, not sure if helping discussed to titrate down to one and off and see if makes a difference   - no nsaids given renal insufficiency    Lympholipedema  stable  - Lymphedema therapycontinue  - able to do pump daily,going between 40-50 of pressure but tolerating well.  - has below knee velcro wraps,  thigh wraps  - has received jaycee pants    - tsh, bmp wnl except renal insufficiency    Knee pain  - end stage oa and obesity related  - xray knees reviewed w severe arthritis  - sp refer to pain management for us guided steroid joint injections; had one month of relief  - also tried genicular nerve blocks w no relief so did not move forward w RFA  - referral to ortho re eval for gel injections; she knows she would not be a surgical candidate due to obesity. Discussed gel less likely to help in severe oa  - if not able to do blind knee injection for gel; can consider referral to sports so can US guided.  - DME rollator for improved mobility     Numbness in feet, ho idiopathic neuropathy  - intermittent but more severe in last day  - will monitor  - could also be related to spine  - b12 and vitamin d wnl reviewed 02/20/25    Cts, ulnar neuropathy  - pending ortho hand    Overall lymphedema stable. She has had her garments for one year. Has not been wearing capris due to bladder urgency. Compression knee highs are wearing out and  needs new script.  The knees are overall stable. Working on losing weight. Using cane mainly. She was gping to have     She has had a few falls since last seen, 4 times and then close calls. She has dizziness when standing (2) and then goes away, sometimes doesn't go away., also gets light headed., has been happening on and off last year, but more often.  Also pain in tailbone,  Another fall she passed out and felt she had a dream. She came to right away.   Happens when turned fast and was about to pass out. States head gets fuzzy.     She sees cardiologist preventatively. Not vertigo. She has a ho of tinnitus.  Feels drinking enough,. 2 L a day.        Injections:    Bl us guided steroid injection by pain management 2/2024 : some relief so far  2. genicular nerve blocks bl April 2024; no relief    Imaging  Reviewed 02/20/25 personally  EMG 7/2024  IMPRESSION:  Impression:     This is an abnormal study. There is electrophysiologic evidence consistent with a moderately severe, left median neuropathy across the wrist. These electrophysiologic findings are consistent with a clinical diagnosis of carpal tunnel syndrome. In   addition, there is electrophysiologic evidence of a moderate left ulnar neuropathy across the wrist. The pathophysiology is predominantly demyelinating with secondary axonal loss. Lastly, there are findings suggestive of a superimposed, generalized,   axonal, motor and sensory peripheral polyneuropathy.     Please note: with the absence of needle EMG (see above) in the lower extremity a lumbosacral radiculopathy cannot be excluded.  Xr bl knee pain 12/2024  FINDINGS:  Advanced osteoarthritis left knee worst medially.      Advanced tricompartmental osteoarthritis right knee also worst  medially.      No fracture or lesion bilaterally.      IMPRESSION:  Advanced osteoarthritis bilateral knees worst in the medial  compartments    Xr pelvis 6/2023  FINDINGS:  No fracture or dislocation is evident.Mild  degenerative changes seen  of the hips. Mild degenerative changes seen of the SI joints and the  pubic symphysis. Vascular calcifications are seen over the soft  tissues.     IMPRESSION:  Mild degenerative change of the hips without osseous injury evident.  Xr l spine 6/2023    FINDINGS:  There are  5 lumbar type vertebral bodies. There is grade 1  anterolisthesis of L3 on L4 and L4 on L5. Vertebral body height and  alignment  are otherwise maintained.  No fracture or dislocation is  evident.  There is severe L3-4 through L5-S1 facet degenerative  change bilaterally. There is severe L5-S1 discogenic degenerative  change. Mild discogenic and facet degenerative changes seen at other  levels of the visualized spine. Mild degenerative changes seen of the  sacroiliac joints. Vascular calcifications and surgical changes are  seen over the soft tissues.     IMPRESSION:  1. Multilevel spondylolisthesis.  2. Degenerative change as above.     [PMH]  renal insufficiency  hypothyroidism  HTN  GERD  HLP  PVCs          [SOCHX]  retired  used to work at as a unit clerk  lives w   social alcohol  no smoking or drug use     Review of Systems:  Constitutional: Denies fever or chills, malaise, weight changes.   Eyes: Denies change in visual acuity   HENT: Denies nasal congestion or sore throat   Respiratory: Denies cough or shortness of breath   Cardiovascular: Denies chest pain or edema   GI: Denies abdominal pain, nausea, vomiting, bloody stools or diarrhea   : Denies dysuria   Integument: Denies rash   Neurologic: As per HPI  MSK: Per above HPI  Endocrine: Denies polyuria or polydipsia   Lymphatic: Denies swollen glands   Psychiatric: Denies depression or anxiety           PHYSICAL EXAM:  /75 (BP Location: Right leg, Patient Position: Sitting)   Pulse 71   Resp 20   LMP  (LMP Unknown)     General: NAD, obese  Psychiatric: appropriate mood & affect.   Cardiovascular: Normal pedal pulses; no LE edema.  Respiratory:  Normal rate; unlabored breathing.  Skin: disal erythema and sensitivity bl  Lymphatic: LE lympho and lipedema  NEURO: Alert and appropriate. Speech fluent, conversing appropriately.   Motor:   Rt: HF 5/5, KE 5/5, KF 5/5, DF 5/5, EHL 5/5, PF 5/5.   Lt: HF 5/5, KE 5/5, KF 5/5, DF 5/5, EHL 5/5, PF 5/5.  Sensation:    Light touch: intact in the b/l L3-S1 dermatomes.   PP: intact in the b/l L3-S1 dermatomes  Reflexes:    Right: patellar 2+, achilles 2+,    Left: patellar 2+, achilles 2+,    Babinski's downgoing b/l; no clonus  Gait:wide based slow antalgic  MSK:  Inspection reveals no evidence of a pelvic obliqity   Spinal range of motion: Flexion to 90° degrees, Extension of 30 degrees.  There is tenderness over the lumbar paraspinals       Ttp anterior and medial joint  Flexion 80, ext -10   Antalgic gait       Impression: Ms. Wills is a 69 yo F w pmh of obesity, lymphedema, lipedema, hypothyroidism, gerd presenting with back and hip pain. 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 Made great progress in lymphedema therapy below the knees, but around knees and thighs still significant swelling. Back pain improved w therapy, but now back to baseline. She is overall more active that she has been in years. Knees are also improving after steroid injections but only lasted one month, genicular nerve block wo relief.. she does have general leg weakness and continued back pain likely consistent w spinal stenosis. Overall today feeling better, less knee pain, uses cane but not walker. Continues w regular lymphedema management w garments and pump.     Plan:  New dizziness, lightheadedness, twice passed out for few seconds   - ? Cardiac in nature since when going from sit to stand or turning  - not vertigo  - can trial cutting hydrochlorothiazide in half until sees pcp or cardio  - encouraged to fu w pcp and cardio asap, possibly neuro as well  - will try to cut down gabapentin as well      Back pain; likely spinal stenosis and facet arthropathy  - xr L spine reviewed personally shows L5/s1 degenerative disc disease,  xr hips w mild oa.  reviewed w patient and on personal review mild hip oa and extensive spondylosis  - completed water therapy for core rom hep Le strengthening; initally had improvement but now worsening    - discussing swimming for exercise  - gabapentin 300 mg bid  renal fx borderline, not sure if helping discussed to titrate down to one and off and see if makes a difference; also ? Ccontributes dizziness so might wean off for that too   - no nsaids given renal insufficiency    Lympholipedema  stable  - Lymphedema therapycontinue  - new lymph therapy referral to get measured for garments, gets them online source  - able to do pump daily,going between 40-50 of pressure but tolerating well.  - has below knee velcro wraps,  thigh wraps  - has received jaycee pants    - tsh, bmp wnl except renal insufficiency    Knee pain  - end stage oa and obesity related  - xray knees reviewed w severe arthritis  - sp refer to pain management for us guided steroid joint injections; had one month of relief  - also tried genicular nerve blocks w no relief so did not move forward w RFA  - referral to ortho re eval for gel injections; she knows she would not be a surgical candidate due to obesity. Discussed gel less likely to help in severe oa  - if not able to do blind knee injection for gel; can consider referral to sports so can US guided.  - DME rollator for improved mobility     Numbness in feet, ho idiopathic neuropathy  - intermittent but more severe in last day  - will monitor  - could also be related to spine  - b12 and vitamin d wnl reviewed 02/20/25    Cts, ulnar neuropathy  - pending release for cts    Fu 6 months and prn  Louise Patel MD  Physical Medicine and Rehabilitation

## 2025-02-27 ENCOUNTER — APPOINTMENT (OUTPATIENT)
Dept: PRIMARY CARE | Facility: CLINIC | Age: 70
End: 2025-02-27
Payer: MEDICARE

## 2025-02-27 VITALS
WEIGHT: 293 LBS | DIASTOLIC BLOOD PRESSURE: 74 MMHG | BODY MASS INDEX: 48.82 KG/M2 | HEIGHT: 65 IN | TEMPERATURE: 98 F | SYSTOLIC BLOOD PRESSURE: 160 MMHG | OXYGEN SATURATION: 92 % | HEART RATE: 70 BPM

## 2025-02-27 DIAGNOSIS — R55 SYNCOPE, UNSPECIFIED SYNCOPE TYPE: Primary | ICD-10-CM

## 2025-02-27 DIAGNOSIS — F33.2 SEVERE EPISODE OF RECURRENT MAJOR DEPRESSIVE DISORDER, WITHOUT PSYCHOTIC FEATURES (MULTI): ICD-10-CM

## 2025-02-27 DIAGNOSIS — R51.9 CHRONIC NONINTRACTABLE HEADACHE, UNSPECIFIED HEADACHE TYPE: ICD-10-CM

## 2025-02-27 DIAGNOSIS — E03.9 ACQUIRED HYPOTHYROIDISM: ICD-10-CM

## 2025-02-27 DIAGNOSIS — N18.31 STAGE 3A CHRONIC KIDNEY DISEASE (MULTI): ICD-10-CM

## 2025-02-27 DIAGNOSIS — G89.29 CHRONIC NONINTRACTABLE HEADACHE, UNSPECIFIED HEADACHE TYPE: ICD-10-CM

## 2025-02-27 PROBLEM — I50.32 CHRONIC DIASTOLIC CONGESTIVE HEART FAILURE: Status: ACTIVE | Noted: 2025-02-27

## 2025-02-27 PROCEDURE — 1158F ADVNC CARE PLAN TLK DOCD: CPT | Performed by: INTERNAL MEDICINE

## 2025-02-27 PROCEDURE — 3078F DIAST BP <80 MM HG: CPT | Performed by: INTERNAL MEDICINE

## 2025-02-27 PROCEDURE — 1159F MED LIST DOCD IN RCRD: CPT | Performed by: INTERNAL MEDICINE

## 2025-02-27 PROCEDURE — 1123F ACP DISCUSS/DSCN MKR DOCD: CPT | Performed by: INTERNAL MEDICINE

## 2025-02-27 PROCEDURE — 3077F SYST BP >= 140 MM HG: CPT | Performed by: INTERNAL MEDICINE

## 2025-02-27 PROCEDURE — 99214 OFFICE O/P EST MOD 30 MIN: CPT | Performed by: INTERNAL MEDICINE

## 2025-02-27 PROCEDURE — 91320 SARSCV2 VAC 30MCG TRS-SUC IM: CPT | Performed by: INTERNAL MEDICINE

## 2025-02-27 PROCEDURE — G2211 COMPLEX E/M VISIT ADD ON: HCPCS | Performed by: INTERNAL MEDICINE

## 2025-02-27 PROCEDURE — 1036F TOBACCO NON-USER: CPT | Performed by: INTERNAL MEDICINE

## 2025-02-27 PROCEDURE — 90480 ADMN SARSCOV2 VAC 1/ONLY CMP: CPT | Performed by: INTERNAL MEDICINE

## 2025-02-27 PROCEDURE — 3008F BODY MASS INDEX DOCD: CPT | Performed by: INTERNAL MEDICINE

## 2025-02-27 RX ORDER — SODIUM FLUORIDE 6.1 MG/ML
PASTE, DENTIFRICE DENTAL
COMMUNITY
Start: 2024-11-22

## 2025-02-27 NOTE — PROGRESS NOTES
"PCP: Octavia Garza MD   I have reviewed existing histories, notes, past medical history, surgical history, social history, family history, med list, allergy list, and immunization, and updated.    HPI:   Pt Complains of  having had syncope, and another near syncope.   In October she was standing outside her car waiting, and she went to reach high to hug her son, who is quite tall, and next thing she remembers she has having \"psychodelic\" dreams, remember hearing his son calling her mom or then calling her name. It lasted for a few minutes. She felt fine afterwards. Then later one another episode but when she called out to her , which seems to have stopped her going further into syncope.  She also has had on and off headaches.   As well as head fuzzy feeling and wobbling legs, and a falls. Each fall it so happened that she fell on her buttock or hip, without causing any injury.      No palpation, chest pain. Some dyspnea with exertion.  Pt will be seeing her cardiologist as well    Lab Results   Component Value Date    WBC 6.8 12/09/2024    HGB 11.4 (L) 12/09/2024    HCT 36.1 12/09/2024     12/09/2024    CHOL 123 05/30/2024    TRIG 111 05/30/2024    HDL 52.2 05/30/2024    ALT 11 05/30/2024    AST 19 05/30/2024     12/09/2024    K 4.2 12/09/2024     12/09/2024    CREATININE 1.04 12/09/2024    BUN 26 (H) 12/09/2024    CO2 31 12/09/2024    TSH 2.24 02/15/2024     Physical exam:  /74   Pulse 70   Temp 36.7 °C (98 °F)   Ht 1.638 m (5' 4.5\")   Wt 137 kg (302 lb)   LMP  (LMP Unknown)   SpO2 92%   BMI 51.04 kg/m²    No focal neurologic deficit  ENT seems okay  Heart RR  Lungs clear  No Abdominal pain  No leg edema.    Assessments/orders:   Assessment & Plan  Syncope, unspecified syncope type    Orders:    Referral to Neurology; Future    MR brain w and wo IV contrast; Future    Vascular US Carotid Artery Duplex Bilateral; Future    Creatinine; Future    Chronic nonintractable headache, " unspecified headache type    Orders:    MR brain w and wo IV contrast; Future    Stage 3a chronic kidney disease (Multi)    Orders:    Creatinine; Future    Chronic diastolic congestive heart failure         Severe episode of recurrent major depressive disorder, without psychotic features (Multi)         BMI 50.0-59.9, adult (Multi)         Acquired hypothyroidism

## 2025-03-02 RX ORDER — DULOXETIN HYDROCHLORIDE 60 MG/1
60 CAPSULE, DELAYED RELEASE ORAL DAILY
Qty: 90 CAPSULE | Refills: 1 | Status: SHIPPED | OUTPATIENT
Start: 2025-03-02

## 2025-03-13 LAB
CREAT SERPL-MCNC: 0.98 MG/DL (ref 0.5–1.05)
EGFRCR SERPLBLD CKD-EPI 2021: 62 ML/MIN/1.73M2

## 2025-03-19 ENCOUNTER — HOSPITAL ENCOUNTER (OUTPATIENT)
Dept: VASCULAR MEDICINE | Facility: CLINIC | Age: 70
Discharge: HOME | End: 2025-03-19
Payer: MEDICARE

## 2025-03-19 DIAGNOSIS — R55 SYNCOPE, UNSPECIFIED SYNCOPE TYPE: ICD-10-CM

## 2025-03-19 DIAGNOSIS — R09.89 OTHER SPECIFIED SYMPTOMS AND SIGNS INVOLVING THE CIRCULATORY AND RESPIRATORY SYSTEMS: ICD-10-CM

## 2025-03-19 PROCEDURE — 93880 EXTRACRANIAL BILAT STUDY: CPT | Performed by: SURGERY

## 2025-03-19 PROCEDURE — 93880 EXTRACRANIAL BILAT STUDY: CPT

## 2025-03-20 ENCOUNTER — HOSPITAL ENCOUNTER (OUTPATIENT)
Dept: RADIOLOGY | Facility: CLINIC | Age: 70
Discharge: HOME | End: 2025-03-20
Payer: MEDICARE

## 2025-03-20 DIAGNOSIS — R55 SYNCOPE, UNSPECIFIED SYNCOPE TYPE: ICD-10-CM

## 2025-03-20 DIAGNOSIS — R51.9 CHRONIC NONINTRACTABLE HEADACHE, UNSPECIFIED HEADACHE TYPE: ICD-10-CM

## 2025-03-20 DIAGNOSIS — G89.29 CHRONIC NONINTRACTABLE HEADACHE, UNSPECIFIED HEADACHE TYPE: ICD-10-CM

## 2025-03-20 PROCEDURE — 70551 MRI BRAIN STEM W/O DYE: CPT

## 2025-04-04 ENCOUNTER — EVALUATION (OUTPATIENT)
Dept: OCCUPATIONAL THERAPY | Facility: CLINIC | Age: 70
End: 2025-04-04
Payer: MEDICARE

## 2025-04-04 DIAGNOSIS — I89.0 LYMPHEDEMA OF BOTH LOWER EXTREMITIES: Primary | ICD-10-CM

## 2025-04-04 DIAGNOSIS — I89.0 LYMPHEDEMA: ICD-10-CM

## 2025-04-04 DIAGNOSIS — R60.9 LIPEDEMA: ICD-10-CM

## 2025-04-04 PROCEDURE — 97535 SELF CARE MNGMENT TRAINING: CPT | Mod: GO

## 2025-04-04 PROCEDURE — 97166 OT EVAL MOD COMPLEX 45 MIN: CPT | Mod: GO

## 2025-04-04 ASSESSMENT — PAIN SCALES - GENERAL: PAINLEVEL_OUTOF10: 0 - NO PAIN

## 2025-04-04 ASSESSMENT — ENCOUNTER SYMPTOMS
LOSS OF SENSATION IN FEET: 0
DEPRESSION: 0
OCCASIONAL FEELINGS OF UNSTEADINESS: 0

## 2025-04-04 ASSESSMENT — PAIN - FUNCTIONAL ASSESSMENT: PAIN_FUNCTIONAL_ASSESSMENT: 0-10

## 2025-04-04 ASSESSMENT — ACTIVITIES OF DAILY LIVING (ADL)
HOME_MANAGEMENT_TIME_ENTRY: 24
ADL_ASSISTANCE: INDEPENDENT

## 2025-04-04 NOTE — PROGRESS NOTES
Occupational Therapy    Occupational Therapy Evaluation    Name: Ariana Wills  MRN: [id number]  : 1955  Date: 25    Time Entry:  Time Calculation  Start Time: 1100  Stop Time: 1154  Time Calculation (min): 54 min  OT Evaluation Time Entry  OT Evaluation (Moderate) Time Entry: 30     OT Therapeutic Procedures Time Entry  Self Care/Home Management (ADLs) Time Entry: 24                Assessment:  Pt is 69  year old with secondary lymphedema stage 2 with BLEs and reporting chronic swelling for about ~ 10 years.   Pt reporting intermittent pain in BL knees impairing participation with IADLs.   Rec lymphedema therapy to address proper fitting garments, reduce edema, increased participation with ADLs and IADLs, I with home management of lymphedema condition, lymphedema pump,       Plan: Intervention plan includes: ADLS, compression bandaging, education/instruction, garment measuring/fitting, home program, IADLs, manual lymphatic drainage, manual therapy, therapeutic activities, therapeutic exercises    Outpatient Plan  Frequency: 1/week  Duration: 10 weeks  Rehab Potential: Excellent  Plan of Care Agreement: Patient    Subjective   Current Problem:  1. Lymphedema of both lower extremities  Follow Up In Occupational Therapy      2. Lymphedema  Referral to Occupational Therapy    Follow Up In Occupational Therapy      3. Lipedema  Follow Up In Occupational Therapy        General:   OT Received On: 25  General  Reason for Referral: lymphedema  Referred By: MD Parth  Past Medical History Relevant to Rehab: Pt return for assessment of garments and chronic swelling of garments  Preferred Learning Style: kinesthetic, written  General Comment: visit 1, no auth [place], : NO AUTH, 96% COVERAGE, [place], 1500 OOP, [employer] MCR, onset 2025 SD  Precautions:  Precautions  STEADI Fall Risk Score (The score of 4 or more indicates an increased risk of falling): 5  Precautions Comment: LRAD for community outings,  falls risk due to syncope events  Vital Signs:     Pain Assessment:  Pain Assessment  Pain Assessment: 0-10  0-10 (Numeric) Pain Score: 0 - No pain    Objective   Cognition:     Cognition Test Scores:     Home Living:  Home Living  Type of Home: House  Lives With: Spouse  Home Adaptive Equipment: Cane  Home Layout: Able to live on main level with bedroom/bathroom  Home Access: Level entry  Bathroom Shower/Tub: Tub/shower unit  Bathroom Toilet: Standard  Bathroom Equipment: Grab bars in shower  Prior Function Per Pt/Caregiver Report:  Prior Function Per Pt/Caregiver Report  Level of [place]: Independent with homemaking with ambulation  ADL Assistance: Independent  Leisure: gardening, family gatherings social participation  Hand Dominance: Right  IADL History:  Homemaking Responsibilities: Yes  Meal Prep Responsibility: Primary  Laundry Responsibility: Primary  Cleaning Responsibility: Secondary  Bill Paying/Finance Responsibility: Secondary  Shopping Responsibility: Primary  ADL:     Activity Tolerance:  Activity Tolerance  Endurance: Tolerates 10 - 20 min exercise with multiple rests     Extremities: RLE   RLE : Within Functional Limits  Strength RLE  RLE Overall Strength: Greater than or equal to 3/5 as evidenced by functional mobility and LLE   LLE : Within Functional Limits  Strength LLE  LLE Overall Strength: Greater than or equal to 3/5 as evidenced by functional mobility  Lymphedema Assessment    Lymphedema Assessments:  Lymphedema Assessments: Lower extremities  Right Upper Extremity:     Left Upper Extremity:     UE Skin Appearance/Condition and Girth:     Upper Extremity Evaluation:     Right Lower Extremity:  R Metatarsal (cm): 22 cm  R Heel Y Angle: 36.5 cm  R Ankle (cm): 29.5 cm  R 10 cm Above Ankle (cm): 42 cm  R 20 cm Above Ankle (cm): 56 cm  R 30 cm Above Ankle (cm): 61 cm  R 40 cm Above Ankle (cm): 0 cm  R Knee (cm): 63.5 cm  R 10 cm Above Knee (cm): 77 cm  R 20 cm Above Knee (cm): 82 cm  R 30  cm Above Knee (cm): 85 cm  R 40 cm Above Knee (cm): 0 cm  R 50 cm Above Knee (cm): 0 cm  Right lower extremity total: 554.5  Left Lower Extremity:  L Metatarsal (cm): 22 cm  L Heel Y Angle: 36 cm  L Ankle (cm): 31 cm  L 10 cm Above Ankle (cm): 41.5 cm  L 20 cm Above Ankle (cm): 56 cm  L 30 cm Above Ankle (cm): 61 cm  L 40 cm Above Ankle (cm): 0 cm  L Knee (cm): 62 cm  L 10 cm Above Knee (cm): 75 cm  L 20 cm Above Knee (cm): 81 cm  L 30 cm Above Knee (cm): 84 cm  L 40 cm Above Knee (cm): 0 cm  L 50 cm Above Knee (cm): 0 cm  Left Lower extremity total: 549.5  LE Skin Appearance/Condition and Girth:  Edema - Pitting: YES  Hemosiderin Staining: YES  Lower Extremity Evaluation:     Head and Neck Measurements:     Head and Neck Appearance:     Trunk Skin Appearance/Condition and Girth:      Genital Skin Appearance/Condition and Girth:     Home Living:     ADL:     Prior Function:  Level of [place]: Independent with homemaking with ambulation  ADL Assistance: Independent  Leisure: gardening, family gatherings social participation  Hand Dominance: Right  Pain Assessment:  Pain Assessment: 0-10  0-10 (Numeric) Pain Score: 0 - No pain  Therapy Precautions:  STEADI Fall Risk Score (The score of 4 or more indicates an increased risk of falling): 5  Precautions Comment: LRAD for community outings, falls risk due to syncope events      Outcome Measures:  OT Adult Other Outcome Measures  Lymphedema Life Impact Scale (LLIS): 18    OP EDUCATION:  Education  Individual(s) Educated: Patient  Education Provided: Lymphedema  Home Program: Handout issued  Risk and Benefits Discussed with Patient/Caregiver/Other: yes  Patient/Caregiver Demonstrated Understanding: yes  Plan of Care Discussed and Agreed Upon: yes  Patient Response to Education: Patient/Caregiver Verbalized Understanding of Information, Patient/Caregiver Asked Appropriate Questions  Education Comment: discussed with pt garment for reduction if pt wants to pursue.  pt in  agreement to do so.    Goals:  Active       Lymphedema       Pt will demo 2-5% cm  of volume reduction with BLE in order for proper fitting of medical compression garments and increase ease in transfers and IADLs        Start:  04/04/25    Expected End:  07/25/25            LTG - Doning and doffing of compression garment(s) per recommended wear schedule 100% of the time, will demonstrate independence by patient/caregivier       Start:  04/04/25    Expected End:  07/25/25            Pt will complete leisure activity for 30+ minutes with no more than 2 rest break with LRAD as displayed by reduction of swelling in BLES        Start:  04/04/25    Expected End:  07/25/25            Pt will improve strengthening in order to perform self-care, household, work and/or leisure tasks. 4/5 mmt BLES        Start:  04/04/25    Expected End:  07/25/25

## 2025-04-09 DIAGNOSIS — F51.02 ADJUSTMENT INSOMNIA: ICD-10-CM

## 2025-04-09 DIAGNOSIS — G89.29 OTHER CHRONIC PAIN: ICD-10-CM

## 2025-04-09 DIAGNOSIS — M54.50 LOW BACK PAIN, UNSPECIFIED: ICD-10-CM

## 2025-04-09 RX ORDER — GABAPENTIN 300 MG/1
300 CAPSULE ORAL 2 TIMES DAILY
Qty: 180 CAPSULE | Refills: 3 | Status: SHIPPED | OUTPATIENT
Start: 2025-04-09

## 2025-04-13 ASSESSMENT — DERMATOLOGY QUALITY OF LIFE (QOL) ASSESSMENT
RATE HOW EMOTIONALLY BOTHERED YOU ARE BY YOUR SKIN PROBLEM (FOR EXAMPLE, WORRY, EMBARRASSMENT, FRUSTRATION): 3
RATE HOW BOTHERED YOU ARE BY EFFECTS OF YOUR SKIN PROBLEMS ON YOUR ACTIVITIES (EG, GOING OUT, ACCOMPLISHING WHAT YOU WANT, WORK ACTIVITIES OR YOUR RELATIONSHIPS WITH OTHERS): 0 - NEVER BOTHERED
WHAT SINGLE SKIN CONDITION LISTED BELOW IS THE PATIENT ANSWERING THE QUALITY-OF-LIFE ASSESSMENT QUESTIONS ABOUT: ROSACEA
RATE HOW BOTHERED YOU ARE BY SYMPTOMS OF YOUR SKIN PROBLEM (EG, ITCHING, STINGING BURNING, HURTING OR SKIN IRRITATION): 1
RATE HOW EMOTIONALLY BOTHERED YOU ARE BY YOUR SKIN PROBLEM (FOR EXAMPLE, WORRY, EMBARRASSMENT, FRUSTRATION): 3
WHAT SINGLE SKIN CONDITION LISTED BELOW IS THE PATIENT ANSWERING THE QUALITY-OF-LIFE ASSESSMENT QUESTIONS ABOUT: ROSACEA
DATE THE QUALITY-OF-LIFE ASSESSMENT WAS COMPLETED: 67308
RATE HOW BOTHERED YOU ARE BY EFFECTS OF YOUR SKIN PROBLEMS ON YOUR ACTIVITIES (EG, GOING OUT, ACCOMPLISHING WHAT YOU WANT, WORK ACTIVITIES OR YOUR RELATIONSHIPS WITH OTHERS): 0 - NEVER BOTHERED
RATE HOW BOTHERED YOU ARE BY SYMPTOMS OF YOUR SKIN PROBLEM (EG, ITCHING, STINGING BURNING, HURTING OR SKIN IRRITATION): 1

## 2025-04-14 ENCOUNTER — APPOINTMENT (OUTPATIENT)
Dept: DERMATOLOGY | Facility: CLINIC | Age: 70
End: 2025-04-14
Payer: MEDICARE

## 2025-04-14 DIAGNOSIS — D22.9 MELANOCYTIC NEVUS, UNSPECIFIED LOCATION: ICD-10-CM

## 2025-04-14 DIAGNOSIS — D48.5 NEOPLASM OF UNCERTAIN BEHAVIOR OF SKIN: ICD-10-CM

## 2025-04-14 DIAGNOSIS — D18.01 HEMANGIOMA OF SKIN: ICD-10-CM

## 2025-04-14 DIAGNOSIS — L57.9 SKIN CHANGES DUE TO CHRONIC EXPOSURE TO NONIONIZING RADIATION: ICD-10-CM

## 2025-04-14 DIAGNOSIS — L81.4 LENTIGO: ICD-10-CM

## 2025-04-14 DIAGNOSIS — L57.8 OTHER SKIN CHANGES DUE TO CHRONIC EXPOSURE TO NONIONIZING RADIATION: Primary | ICD-10-CM

## 2025-04-14 DIAGNOSIS — R21 RASH AND OTHER NONSPECIFIC SKIN ERUPTION: ICD-10-CM

## 2025-04-14 DIAGNOSIS — L82.1 SEBORRHEIC KERATOSIS: ICD-10-CM

## 2025-04-14 DIAGNOSIS — L71.9 ROSACEA: ICD-10-CM

## 2025-04-14 PROCEDURE — 1123F ACP DISCUSS/DSCN MKR DOCD: CPT | Performed by: STUDENT IN AN ORGANIZED HEALTH CARE EDUCATION/TRAINING PROGRAM

## 2025-04-14 PROCEDURE — G2211 COMPLEX E/M VISIT ADD ON: HCPCS | Performed by: STUDENT IN AN ORGANIZED HEALTH CARE EDUCATION/TRAINING PROGRAM

## 2025-04-14 PROCEDURE — 1159F MED LIST DOCD IN RCRD: CPT | Performed by: STUDENT IN AN ORGANIZED HEALTH CARE EDUCATION/TRAINING PROGRAM

## 2025-04-14 PROCEDURE — 99214 OFFICE O/P EST MOD 30 MIN: CPT | Performed by: STUDENT IN AN ORGANIZED HEALTH CARE EDUCATION/TRAINING PROGRAM

## 2025-04-14 RX ORDER — DESONIDE 0.5 MG/G
CREAM TOPICAL 2 TIMES DAILY
Qty: 60 G | Refills: 3 | Status: SHIPPED | OUTPATIENT
Start: 2025-04-14 | End: 2025-04-16 | Stop reason: SDUPTHER

## 2025-04-14 RX ORDER — METRONIDAZOLE 7.5 MG/G
1 CREAM TOPICAL 2 TIMES DAILY
Qty: 90 G | Refills: 3 | Status: SHIPPED | OUTPATIENT
Start: 2025-04-14

## 2025-04-14 NOTE — PROGRESS NOTES
Subjective     Ariana Wills is a 69 y.o. female who presents for the following: Skin Check (FBSE. Hx of BCC on right eyelid. History of Sk's, Lentigo, Rosacea (Rx metronidazole cream; however, she feels that it is not working well as she experiencing more redness to face than normal).).     Review of Systems:  No other skin or systemic complaints other than what is documented elsewhere in the note.    The following portions of the chart were reviewed this encounter and updated as appropriate:         Skin Cancer History  No skin cancer on file.      Specialty Problems          Dermatology Problems    Rosacea        Objective   Well appearing patient in no apparent distress; mood and affect are within normal limits.    A full examination was performed including scalp, head, eyes, ears, nose, lips, neck, chest, axillae, abdomen, back, buttocks, bilateral upper extremities, bilateral lower extremities, hands, feet, fingers, toes, fingernails, and toenails. All findings within normal limits unless otherwise noted below.    Assessment/Plan   1. Other skin changes due to chronic exposure to nonionizing radiation  Mottled pigmentation, telangiectasias and brown reticular macules in sun exposed areas on the face, extremities, trunk    The risk of chronic, cumulative sun damage and risk of development of skin cancer was reviewed with the patient today. Discussed important of sun protection with sun protective clothing and/or broad spectrum sunscreen spf 30 or above.  Warning signs of skin cancer were reviewed. Patient to contact office should they notice any new or changing pre-existing skin lesion.    Related Procedures  Follow Up In Dermatology - Established Patient  Follow Up In Dermatology - Established Patient    2. Neoplasm of uncertain behavior of skin  Right Upper Eyelid  Near scar there is a 2-3 mm subcutaneous papule    Hx BCC s/p excision x2 with ophthalmology in 0650-7205  2 weeks ago developed swelling and  redness of upper eyelid  Now improved with residual bump  Favor inflammatory process such as stye, but is very close to surgical scar  Continue warm compresses for 2 additional weeks, if not improved patient is to call office and we will perform biopsy    3. Melanocytic nevus, unspecified location  Benign to slightly atypical appearing brown pigmented macules and papules    Clinically benign appearing nevi, reassurance provided to the patient today. Discussed important of sun protection with sun protective clothing and/or broad spectrum sunscreen spf 30 or above.  ABCDEs of melanoma reviewed. Patient to f/u should they notice any new or changing pre-existing skin lesion.    4. Seborrheic keratosis  Stuck on verrucous, tan-brown papules and plaques.      The benign nature of these skin lesions were reviewed with the patient today and reassurance was provided. Patient advised to return for f/u if the lesions change in size, shape, color, become painful, tender, itch or bleed.    5. Hemangioma of skin  Bright red papules    Discussed benign nature of condition, reassured. Reviewed warning signs of skin cancer with patient.    6. Lentigo  Scattered tan macules in sun-exposed areas.    Benign nature of these skin lesions reviewed and relation to sun exposure discussed. Reassurance provided. Reviewed warning signs of skin cancer.    7. Skin changes due to chronic exposure to nonionizing radiation  Mottled pigmentation, telangiectasias and brown reticular macules in sun exposed areas on the face, extremities, trunk    The risk of chronic, cumulative sun damage and risk of development of skin cancer was reviewed with the patient today. Discussed important of sun protection with sun protective clothing and/or broad spectrum sunscreen spf 30 or above.  Warning signs of skin cancer were reviewed. Patient to contact office should they notice any new or changing pre-existing skin lesion.    8. Rash and other nonspecific skin  eruption  Pubic  Labia majora with erythema     Reports significant pruritus  No evidence of skin cancer and not consistent with primary dermatosis such as lichen sclerosus, psoriasis, lichen simplex chronicus  Favor irritation from padding, recommend desonide bid until resolve. No more than 2 weeks    desonide (DesOwen) 0.05 % cream - Pubic  Apply topically 2 times a day for 14 days. To rash in groin    9. Rosacea  Head - Anterior (Face)  Central facial erythema and pustules    Increase metrocream to bid, refilled today  Continue sun protection  Can consider PDL in future     metroNIDAZOLE (Metrocream) 0.75 % cream - Head - Anterior (Face)  Apply 1 Application topically 2 times a day. To face

## 2025-04-16 DIAGNOSIS — R21 RASH AND OTHER NONSPECIFIC SKIN ERUPTION: ICD-10-CM

## 2025-04-16 RX ORDER — DESONIDE 0.5 MG/G
CREAM TOPICAL 2 TIMES DAILY
Qty: 60 G | Refills: 3 | Status: SHIPPED | OUTPATIENT
Start: 2025-04-16 | End: 2025-04-30

## 2025-04-17 ENCOUNTER — OFFICE VISIT (OUTPATIENT)
Dept: ORTHOPEDIC SURGERY | Facility: HOSPITAL | Age: 70
End: 2025-04-17
Payer: MEDICARE

## 2025-04-17 DIAGNOSIS — M65.332 TRIGGER FINGER, LEFT MIDDLE FINGER: ICD-10-CM

## 2025-04-17 DIAGNOSIS — M75.41 SHOULDER IMPINGEMENT SYNDROME, RIGHT: Primary | ICD-10-CM

## 2025-04-17 PROCEDURE — 20550 NJX 1 TENDON SHEATH/LIGAMENT: CPT | Mod: LT | Performed by: ORTHOPAEDIC SURGERY

## 2025-04-17 PROCEDURE — 20611 DRAIN/INJ JOINT/BURSA W/US: CPT | Mod: RT | Performed by: ORTHOPAEDIC SURGERY

## 2025-04-17 PROCEDURE — 2500000004 HC RX 250 GENERAL PHARMACY W/ HCPCS (ALT 636 FOR OP/ED): Performed by: ORTHOPAEDIC SURGERY

## 2025-04-17 PROCEDURE — 99213 OFFICE O/P EST LOW 20 MIN: CPT | Mod: 25 | Performed by: ORTHOPAEDIC SURGERY

## 2025-04-17 RX ADMIN — LIDOCAINE HYDROCHLORIDE 3 ML: 10 INJECTION, SOLUTION INFILTRATION; PERINEURAL at 15:00

## 2025-04-17 RX ADMIN — TRIAMCINOLONE ACETONIDE 40 MG: 40 INJECTION, SUSPENSION INTRA-ARTICULAR; INTRAMUSCULAR at 15:00

## 2025-04-17 RX ADMIN — METHYLPREDNISOLONE ACETATE 20 MG: 40 INJECTION, SUSPENSION INTRA-ARTICULAR; INTRALESIONAL; INTRAMUSCULAR; SOFT TISSUE at 15:00

## 2025-04-17 RX ADMIN — LIDOCAINE HYDROCHLORIDE 1 ML: 10 INJECTION, SOLUTION INFILTRATION; PERINEURAL at 15:00

## 2025-04-18 RX ORDER — LIDOCAINE HYDROCHLORIDE 10 MG/ML
3 INJECTION, SOLUTION INFILTRATION; PERINEURAL
Status: COMPLETED | OUTPATIENT
Start: 2025-04-17 | End: 2025-04-17

## 2025-04-18 RX ORDER — METHYLPREDNISOLONE ACETATE 40 MG/ML
20 INJECTION, SUSPENSION INTRA-ARTICULAR; INTRALESIONAL; INTRAMUSCULAR; SOFT TISSUE
Status: COMPLETED | OUTPATIENT
Start: 2025-04-17 | End: 2025-04-17

## 2025-04-18 RX ORDER — TRIAMCINOLONE ACETONIDE 40 MG/ML
40 INJECTION, SUSPENSION INTRA-ARTICULAR; INTRAMUSCULAR
Status: COMPLETED | OUTPATIENT
Start: 2025-04-17 | End: 2025-04-17

## 2025-04-18 RX ORDER — LIDOCAINE HYDROCHLORIDE 10 MG/ML
1 INJECTION, SOLUTION INFILTRATION; PERINEURAL
Status: COMPLETED | OUTPATIENT
Start: 2025-04-17 | End: 2025-04-17

## 2025-04-18 ASSESSMENT — ENCOUNTER SYMPTOMS
FEVER: 0
ARTHRALGIAS: 1
WOUND: 0
TROUBLE SWALLOWING: 0
WHEEZING: 0
EYE DISCHARGE: 0
SHORTNESS OF BREATH: 0
CHILLS: 0
SINUS PAIN: 0
JOINT SWELLING: 1

## 2025-04-18 ASSESSMENT — PATIENT HEALTH QUESTIONNAIRE - PHQ9
2. FEELING DOWN, DEPRESSED OR HOPELESS: NOT AT ALL
1. LITTLE INTEREST OR PLEASURE IN DOING THINGS: NOT AT ALL
SUM OF ALL RESPONSES TO PHQ9 QUESTIONS 1 AND 2: 0

## 2025-04-18 ASSESSMENT — PAIN - FUNCTIONAL ASSESSMENT: PAIN_FUNCTIONAL_ASSESSMENT: 0-10

## 2025-04-18 ASSESSMENT — PAIN SCALES - GENERAL: PAINLEVEL_OUTOF10: 5 - MODERATE PAIN

## 2025-04-18 NOTE — PROGRESS NOTES
Reason for Appointment  Chief Complaint   Patient presents with    Right Shoulder - Pain    Left Hand - Pain     History of Present Illness  Patient is a 69 y.o. female here today for follow-up evaluation of her right shoulder and her left hand.  She has had increased lateral sided right shoulder pain, worse with lifting and overhead activity and she has had triggering of her left long finger.  She has had previous trigger releases in the past.  She has had injections in the shoulder in the past that have helped her.  No other changes in her past medical history, allergies, or medications.    Medical History[1]    Surgical History[2]    Medication Documentation Review Audit       Reviewed by Marika Godinez RN (Registered Nurse) on 04/14/25 at 1018      Medication Order Taking? Sig Documenting Provider Last Dose Status   acetaminophen (Tylenol) 500 mg tablet [id number] No Take by mouth every 6 hours if needed. Historical Provider, MD Past Week Active   albuterol 90 mcg/actuation inhaler [id number] No Inhale every 4 hours if needed. Historical Provider, MD Past Month Active   atenolol (Tenormin) 100 mg tablet [id number] No Take 0.5 tablets (50 mg) by mouth once daily. Octavia Garza MD 12/9/2024 Morning Active   azelastine (Astelin) 137 mcg (0.1 %) nasal spray [id number] No Administer 2 sprays into each nostril 2 times a day. Historical Provider, MD Past Month Active   cholecalciferol (Vitamin D-3) 5,000 Units tablet [id number] No Take 1 tablet (5,000 Units) by mouth once daily. Historical Provider, MD 12/9/2024 Morning Active   diclofenac sodium (Voltaren Arthritis Pain) 1 % gel [id number] No Apply 4.5 inches (4 g) topically 4 times a day as needed. Historical Provider, MD Past Week Active   DULoxetine (Cymbalta) 60 mg DR capsule [id number]  TAKE 1 CAPSULE BY MOUTH ONCE DAILY. Octavia Garza MD  Active   famotidine (Pepcid) 20 mg tablet [id number]  Take 1 tablet (20 mg) by mouth 2 times a day for 5 days. Leif Silverman PA-C    25 2359   flunisolide (Nasalide) 25 mcg (0.025 %) spray,non-aerosol [id number] No Administer into affected nostril(s) once daily as needed. Historical Provider, MD Past Week Active   Discontinued 25 1126   gabapentin (Neurontin) 300 mg capsule [id number]  Take 1 capsule (300 mg) by mouth 2 times a day. Louise Patel MD  Active   glucosamine HCl-msm-chondroitn 750-375-400 mg tablet [id number] No Take by mouth. Aaron Encinas MD Not Taking Active   hydroCHLOROthiazide (HYDRODiuril) 25 mg tablet [id number] No TAKE 1 TABLET DAILY Octavia Garza MD 2024 Morning Active   ibuprofen 200 mg tablet [id number] No Take by mouth every 6 hours if needed for mild pain (1 - 3). Aaron Encinas MD 2024 Active   levothyroxine (Synthroid, Levoxyl) 50 mcg tablet [id number] No TAKE 1 TABLET DAILY Octavia Garza MD 2024 Morning Active   lisinopril 40 mg tablet [id number] No TAKE 1 TABLET DAILY Octavia Garza MD 2024 Morning Active   mirabegron (Myrbetriq) 50 mg tablet extended release 24 hr 24 hr tablet [id number]  Take 1 tablet (50 mg) by mouth once daily. Perez Wild MD [facility]  Active   multivitamin capsule [id number] No Take by mouth once daily. Aaorn Encinas MD 2024 Active   Sodium Fluoride 5000 Dry Mouth 1.1 % dental paste [id number]  BRUSH AS DIRECTED Historical Provider, MD  Active                    RX Allergies[3]    Review of Systems   Constitutional:  Negative for chills and fever.   HENT:  Negative for mouth sores, sinus pain and trouble swallowing.    Eyes:  Negative for discharge.   Respiratory:  Negative for shortness of breath and wheezing.    Cardiovascular:  Negative for chest pain.   Musculoskeletal:  Positive for arthralgias and joint swelling.   Skin:  Negative for rash and wound.   All other systems reviewed and are negative.    Exam   On exam the right shoulder shows pain with active forward flexion over 130 degrees.  She has markedly positive  impingement signs on the right, mild weakness with resisted external rotation.  Deltoid is functional.  Mild DJD in the hands, she has active triggering of the left long finger, tender over the left long A1 pulley tendon sheath.  Good pulses and sensation in the upper extremities.    Assessment   Right shoulder impingement  Left long trigger finger    Plan   She would like injections today into the shoulder and hand.  We sterilely injected under ultrasound guidance Kenalog and lidocaine in the right shoulder subacromial space and Depo-Medrol and lidocaine into the left long finger A1 pulley tendon sheath.  Patient understands the small risk of infection and the signs to look for as well as flare action.  Hopefully these give her good relief.  She can follow-up with us as needed  Patient ID: Ariana Wills is a 69 y.o. female.    L Inj/Asp: R subacromial bursa on 4/17/2025 3:00 PM  Indications: pain  Details: 22 G needle, ultrasound-guided  Medications: 40 mg triamcinolone acetonide 40 mg/mL; 3 mL lidocaine 10 mg/mL (1 %)  Outcome: tolerated well, no immediate complications    After discussing the risks and benefits of the procedure with proceeded with an injection.  Using ultrasound guidance we identified the acromion, humeral head and the subacromial bursa, images obtained and saved. We then sterilely injected the right subacrominal space with a mixture of 40 mg of Kenalog and 1 cc of 1 % lidocaine. Pt tolerated the procedure well without any adverse reactions.   Procedure, treatment alternatives, risks and benefits explained, specific risks discussed. Consent was given by the patient. Immediately prior to procedure a time out was called to verify the correct patient, procedure, equipment, support staff and site/side marked as required. Patient was prepped and draped in the usual sterile fashion.       Hand / UE Inj/Asp: L long A1 for trigger finger on 4/17/2025 3:00 PM  Indications: pain  Details: 25 G needle,  ultrasound-guided  Medications: 1 mL lidocaine 10 mg/mL (1 %); 20 mg methylPREDNISolone acetate 40 mg/mL  Outcome: tolerated well, no immediate complications    After discussing the risks and benefits of the procedure we proceeded with the injection. Under ultrasound guidance we identified the metacarpal bone and overlying tendon sheath with the FDS and FDP tendons, images obtained and saved. We then sterilely injected the left long finger A1 pulley with a mixture of 20 mg of DepoMedrol and .5 cc of 1% lidocaine. Pt tolerated the procedure well without any adverse reactions.    Procedure, treatment alternatives, risks and benefits explained, specific risks discussed. Consent was given by the patient. Immediately prior to procedure a time out was called to verify the correct patient, procedure, equipment, support staff and site/side marked as required. Patient was prepped and draped in the usual sterile fashion.         Camryn ORTEGA PA-C, am acting as a scribe and attest that this documentation has been prepared under the direction and in the presence of Yasmany Yeh MD.    By signing below, Yasmany ORTEGA MD, personally performed the services described in this documentation. All medical record entries made by the scribe were at my direction and in my presence. I have reviewed the chart and agree that the record reflects my personal performance and is accurate and complete.                       [1]   Past Medical History:  Diagnosis Date    Abnormal weight gain 05/04/2016    Weight gain    Anemia     Anxiety     Arthritis     Candidiasis, unspecified 05/22/2015    Yeast infection    Cellulitis, unspecified 09/07/2014    Cellulitis    Chronic pain disorder     CKD (chronic kidney disease)     Colon polyp     Delayed emergence from general anesthesia     Depression     Diverticulosis     Dizziness     Encounter for screening for malignant neoplasm of cervix 05/22/2015    Pap smear for cervical cancer  screening    Encounter for screening mammogram for malignant neoplasm of breast 05/22/2015    Visit for screening mammogram    Encounter for screening mammogram for malignant neoplasm of breast 05/22/2015    Visit for screening mammogram    Furuncle of buttock 09/18/2019    Boil, buttock    GERD (gastroesophageal reflux disease)     Hereditary and idiopathic neuropathy, unspecified 09/29/2013    Idiopathic peripheral neuropathy    Hypertension     Hypothyroid     Hypothyroidism     Liver disease     Lymphedema     Lymphedema, not elsewhere classified 05/22/2015    Lymphedema    BOYER (nonalcoholic steatohepatitis)     Obesity     Other symptoms and signs involving the musculoskeletal system 05/03/2016    Complaints of leg weakness    Pain in left hip 05/03/2016    Hip pain, acute, left    Personal history of other diseases of the nervous system and sense organs     History of peripheral neuropathy    Personal history of other diseases of the respiratory system 04/10/2015    History of acute bronchitis    Personal history of other diseases of the respiratory system 06/22/2015    History of sinusitis    Personal history of other specified conditions 06/28/2016    History of dizziness    Personal history of other specified conditions 05/22/2015    History of chest pain    Sleep apnea     Spinal stenosis     Unspecified asthma with (acute) exacerbation ([facility]) 05/03/2016    Asthma exacerbation    Vertigo     Weakness of trunk musculature 11/01/2023   [2]   Past Surgical History:  Procedure Laterality Date    BLEPHAROPLASTY      CARDIAC CATHETERIZATION  04/03/2023    CARPAL TUNNEL RELEASE  09/27/2013    Neuroplasty Decompression Median Nerve At Carpal Tunnel    CHOLECYSTECTOMY  09/27/2013    Cholecystectomy    COLONOSCOPY      DILATION AND CURETTAGE OF UTERUS     [3] No Known Allergies

## 2025-04-26 ENCOUNTER — PATIENT MESSAGE (OUTPATIENT)
Dept: UROLOGY | Facility: HOSPITAL | Age: 70
End: 2025-04-26
Payer: MEDICARE

## 2025-04-28 DIAGNOSIS — N32.81 OAB (OVERACTIVE BLADDER): Primary | ICD-10-CM

## 2025-04-28 RX ORDER — MIRABEGRON 50 MG/1
50 TABLET, FILM COATED, EXTENDED RELEASE ORAL DAILY
Qty: 90 TABLET | Refills: 0 | Status: SHIPPED | OUTPATIENT
Start: 2025-04-28 | End: 2025-07-27

## 2025-05-09 ENCOUNTER — TREATMENT (OUTPATIENT)
Dept: OCCUPATIONAL THERAPY | Facility: CLINIC | Age: 70
End: 2025-05-09
Payer: MEDICARE

## 2025-05-09 DIAGNOSIS — I89.0 LYMPHEDEMA OF BOTH LOWER EXTREMITIES: Primary | ICD-10-CM

## 2025-05-09 DIAGNOSIS — R60.9 LIPEDEMA: ICD-10-CM

## 2025-05-09 DIAGNOSIS — I89.0 LYMPHEDEMA: ICD-10-CM

## 2025-05-09 PROCEDURE — 97535 SELF CARE MNGMENT TRAINING: CPT | Mod: GO

## 2025-05-09 PROCEDURE — 97140 MANUAL THERAPY 1/> REGIONS: CPT | Mod: GO

## 2025-05-09 ASSESSMENT — PAIN - FUNCTIONAL ASSESSMENT: PAIN_FUNCTIONAL_ASSESSMENT: 0-10

## 2025-05-09 ASSESSMENT — PAIN SCALES - GENERAL: PAINLEVEL_OUTOF10: 0 - NO PAIN

## 2025-05-09 ASSESSMENT — ACTIVITIES OF DAILY LIVING (ADL): HOME_MANAGEMENT_TIME_ENTRY: 30

## 2025-05-09 NOTE — PROGRESS NOTES
Occupational Therapy    Occupational Therapy Treatment    Name: Ariana Wills  MRN: [id number]  : 1955  Date: 25    Time Entry:  Time Calculation  Start Time: 900  Stop Time: 957  Time Calculation (min): 57 min        OT Therapeutic Procedures Time Entry  Manual Therapy Time Entry: 27  Self Care/Home Management (ADLs) Time Entry: 30                Assessment: lymph flow, garment fitting      Plan: Continue with POC as tolerated, monitor home program, assess new garments,           Subjective   General:  OT Last Visit  OT Received On: 25  General  Reason for Referral: lymphedema  Referred By: MD Parth  General Comment: visit 2, no auth [place], : NO AUTH, 96% COVERAGE, [place], 1500 OOP, [employer] MCR, onset 2025 SD  Precautions:  Precautions Comment: LRAD for community outings, falls risk due to syncope events  Pain Assessment:  Pain Assessment  Pain Assessment: 0-10  0-10 (Numeric) Pain Score: 0 - No pain    Objective       Therapy/Activity: Therapeutic Exercise  Therapeutic Exercise Performed: Yes  Therapeutic Exercise Activity 1: long sitting knee flexion and extension with sheet for each LE 10 reps 2 sets    Therapeutic Activity  Therapeutic Activity Performed: Yes  Therapeutic Activity 1: custom fit reduction kit garments for BLEs with edu and instruction for anchoring garments in order to promote ease of management with pt able to verbalize steps with accuracy    Manual Therapy  Manual Therapy Performed: Yes  Manual Therapy Activity 1: girth measurements with LLE volume increased noted and RLE with volume reduction noted  Sensory:     Cognitive Skill Development:     Community/Work Reintegration Training:     Community Mobility:     Vision:     Strength:     Other Activity:     Home environment:     Lymphedema Assessment    Lymphedema Assessments:  Lymphedema Assessments: Lower extremities  Right Upper Extremity:     Left Upper Extremity:     UE Skin Appearance/Condition and Girth:      Upper Extremity Evaluation:     Right Lower Extremity:  R Metatarsal (cm): 22 cm  R Heel Y Angle: 35 cm  R Ankle (cm): 30 cm  R 10 cm Above Ankle (cm): 43 cm  R 20 cm Above Ankle (cm): 56 cm  R 30 cm Above Ankle (cm): 61 cm  R 40 cm Above Ankle (cm): 0 cm  R Knee (cm): 61 cm  R 10 cm Above Knee (cm): 77.8 cm  R 20 cm Above Knee (cm): 82 cm  R 30 cm Above Knee (cm): 85.5 cm  R 40 cm Above Knee (cm): 0 cm  R 50 cm Above Knee (cm): 0 cm  Right lower extremity total: 553.3  Left Lower Extremity:  L Metatarsal (cm): 21.5 cm  L Heel Y Angle: 36 cm  L Ankle (cm): 31.5 cm  L 10 cm Above Ankle (cm): 45.5 cm  L 20 cm Above Ankle (cm): 57.5 cm  L 30 cm Above Ankle (cm): 62.5 cm  L 40 cm Above Ankle (cm): 0 cm  L Knee (cm): 60 cm  L 10 cm Above Knee (cm): 79.5 cm  L 20 cm Above Knee (cm): 82.5 cm  L 30 cm Above Knee (cm): 82.5 cm  L 40 cm Above Knee (cm): 0 cm  L 50 cm Above Knee (cm): 0 cm  Left Lower extremity total: 559  LE Skin Appearance/Condition and Girth:     Lower Extremity Evaluation:     Head and Neck Measurements:     Head and Neck Appearance:     Trunk Skin Appearance/Condition and Girth:      Genital Skin Appearance/Condition and Girth:     Home Living:     ADL:     Prior Function:     Pain Assessment:  Pain Assessment: 0-10  0-10 (Numeric) Pain Score: 0 - No pain  Therapy Precautions:  Precautions Comment: LRAD for community outings, falls risk due to syncope events    OP EDUCATION: garments management        Goals:  Active       Lymphedema       Pt will demo 2-5% cm  of volume reduction with BLE in order for proper fitting of medical compression garments and increase ease in transfers and IADLs        Start:  04/04/25    Expected End:  07/25/25            LTG - Doning and doffing of compression garment(s) per recommended wear schedule 100% of the time, will demonstrate independence by patient/caregivier       Start:  04/04/25    Expected End:  07/25/25            Pt will complete leisure activity for 30+  minutes with no more than 2 rest break with LRAD as displayed by reduction of swelling in BLES        Start:  04/04/25    Expected End:  07/25/25            Pt will improve strengthening in order to perform self-care, household, work and/or leisure tasks. 4/5 mmt BLES        Start:  04/04/25    Expected End:  07/25/25

## 2025-05-16 ENCOUNTER — TREATMENT (OUTPATIENT)
Dept: OCCUPATIONAL THERAPY | Facility: CLINIC | Age: 70
End: 2025-05-16
Payer: MEDICARE

## 2025-05-16 DIAGNOSIS — I89.0 LYMPHEDEMA: ICD-10-CM

## 2025-05-16 DIAGNOSIS — R60.9 LIPEDEMA: ICD-10-CM

## 2025-05-16 DIAGNOSIS — I89.0 LYMPHEDEMA OF BOTH LOWER EXTREMITIES: Primary | ICD-10-CM

## 2025-05-16 PROCEDURE — 97140 MANUAL THERAPY 1/> REGIONS: CPT | Mod: GO

## 2025-05-16 PROCEDURE — 97535 SELF CARE MNGMENT TRAINING: CPT | Mod: GO

## 2025-05-16 ASSESSMENT — PAIN - FUNCTIONAL ASSESSMENT: PAIN_FUNCTIONAL_ASSESSMENT: 0-10

## 2025-05-16 ASSESSMENT — PAIN SCALES - GENERAL: PAINLEVEL_OUTOF10: 0 - NO PAIN

## 2025-05-16 ASSESSMENT — ACTIVITIES OF DAILY LIVING (ADL): HOME_MANAGEMENT_TIME_ENTRY: 16

## 2025-05-16 NOTE — PROGRESS NOTES
Occupational Therapy    Occupational Therapy Treatment    Name: Ariana Wills  MRN: [id number]  : 1955  Date: 25    Time Entry:  Time Calculation  Start Time: 855  Stop Time: 947  Time Calculation (min): 52 min        OT Therapeutic Procedures Time Entry  Manual Therapy Time Entry: 36  Self Care/Home Management (ADLs) Time Entry: 16                Assessment:  volume reduction on BLEs.       Plan: Continue with POC as tolerated, monitor home program, assess new garments,    Pt is phase 2 stage 2 , recommending compression jaycee , 20-30 mmHg for long term maintenance of the chronic condition of lymphedema. 613, Short Compression Capris By Wear Ease size 3x, color black to address swelling in truncal area and groin area          Subjective   General:  OT Last Visit  OT Received On: 25  General  Reason for Referral: lymphedema  Referred By: MD Parth  General Comment: visit 3, no auth [place], : NO AUTH, 96% [place], [place], 1500 OOP, [employer] MCR, onset 2025 SD  Precautions:  Precautions Comment: LRAD for community outings, falls risk due to syncope events  Pain Assessment:  Pain Assessment  Pain Assessment: 0-10  0-10 (Numeric) Pain Score: 0 - No pain    Objective    Therapy/Activity:      Therapeutic Activity  Therapeutic Activity Performed: Yes  Therapeutic Activity 1: custom fit reduction kit due to reduction with lymph fluid    Manual Therapy  Manual Therapy Performed: Yes  Manual Therapy Activity 1: girth measurements with volume reduction noted for BLEs  Manual Therapy Activity 2: hivamat with lymph flow  Sensory:     Cognitive Skill Development:     Community/Work Reintegration Training:     Community Mobility:     Vision:     Strength:     Other Activity:     Home environment:     Lymphedema Assessment    Lymphedema Assessments:  Lymphedema Assessments: Lower extremities  Right Upper Extremity:     Left Upper Extremity:     UE Skin Appearance/Condition and Girth:     Upper Extremity  Evaluation:     Right Lower Extremity:  R Metatarsal (cm): 21.5 cm  R Heel Y Angle: 35 cm  R Ankle (cm): 29 cm  R 10 cm Above Ankle (cm): 42 cm  R 20 cm Above Ankle (cm): 54 cm  R 30 cm Above Ankle (cm): 61 cm  R 40 cm Above Ankle (cm): 0 cm  R Knee (cm): 62 cm  R 10 cm Above Knee (cm): 78.5 cm  R 20 cm Above Knee (cm): 80.5 cm  R 30 cm Above Knee (cm): 84 cm  R 40 cm Above Knee (cm): 0 cm  R 50 cm Above Knee (cm): 0 cm  Right lower extremity total: 547.5  Left Lower Extremity:  L Metatarsal (cm): 21.7 cm  L Heel Y Angle: 36 cm  L Ankle (cm): 30 cm  L 10 cm Above Ankle (cm): 41.5 cm  L 20 cm Above Ankle (cm): 55.5 cm  L 30 cm Above Ankle (cm): 61.5 cm  L 40 cm Above Ankle (cm): 0 cm  L Knee (cm): 61 cm  L 10 cm Above Knee (cm): 78 cm  L 20 cm Above Knee (cm): 82.5 cm  L 30 cm Above Knee (cm): 84 cm  L 40 cm Above Knee (cm): 0 cm  L 50 cm Above Knee (cm): 0 cm  Left Lower extremity total: 551.7  LE Skin Appearance/Condition and Girth:     Lower Extremity Evaluation:     Head and Neck Measurements:     Head and Neck Appearance:     Trunk Skin Appearance/Condition and Girth:      Genital Skin Appearance/Condition and Girth:     Home Living:     ADL:     Prior Function:     Pain Assessment:  Pain Assessment: 0-10  0-10 (Numeric) Pain Score: 0 - No pain  Therapy Precautions:  Precautions Comment: LRAD for community outings, falls risk due to syncope events              OP EDUCATION:       Goals:  Active       Lymphedema       Pt will demo 2-5% cm  of volume reduction with BLE in order for proper fitting of medical compression garments and increase ease in transfers and IADLs        Start:  04/04/25    Expected End:  07/25/25            LTG - Doning and doffing of compression garment(s) per recommended wear schedule 100% of the time, will demonstrate independence by patient/caregivier       Start:  04/04/25    Expected End:  07/25/25            Pt will complete leisure activity for 30+ minutes with no more than 2 rest  break with LRAD as displayed by reduction of swelling in BLES        Start:  04/04/25    Expected End:  07/25/25            Pt will improve strengthening in order to perform self-care, household, work and/or leisure tasks. 4/5 mmt BLES        Start:  04/04/25    Expected End:  07/25/25

## 2025-05-23 ENCOUNTER — TREATMENT (OUTPATIENT)
Dept: OCCUPATIONAL THERAPY | Facility: CLINIC | Age: 70
End: 2025-05-23
Payer: MEDICARE

## 2025-05-23 DIAGNOSIS — I89.0 LYMPHEDEMA: ICD-10-CM

## 2025-05-23 DIAGNOSIS — R60.9 LIPEDEMA: ICD-10-CM

## 2025-05-23 DIAGNOSIS — I89.0 LYMPHEDEMA OF BOTH LOWER EXTREMITIES: Primary | ICD-10-CM

## 2025-05-23 PROCEDURE — 97140 MANUAL THERAPY 1/> REGIONS: CPT | Mod: GO

## 2025-05-23 PROCEDURE — 97535 SELF CARE MNGMENT TRAINING: CPT | Mod: GO

## 2025-05-23 ASSESSMENT — PAIN - FUNCTIONAL ASSESSMENT: PAIN_FUNCTIONAL_ASSESSMENT: 0-10

## 2025-05-23 ASSESSMENT — ACTIVITIES OF DAILY LIVING (ADL): HOME_MANAGEMENT_TIME_ENTRY: 17

## 2025-05-23 ASSESSMENT — PAIN SCALES - GENERAL: PAINLEVEL_OUTOF10: 0 - NO PAIN

## 2025-05-23 NOTE — PROGRESS NOTES
Occupational Therapy    Occupational Therapy Treatment    Name: Ariana Wills  MRN: [id number]  : 1955  Date: 25    Time Entry:  Time Calculation  Start Time: 906  Stop Time: 955  Time Calculation (min): 49 min        OT Therapeutic Procedures Time Entry  Manual Therapy Time Entry: 32  Self Care/Home Management (ADLs) Time Entry: 17                Assessment: lymph flow, garment fitting      Plan: Continue with POC as tolerated, monitor home program, assess new garments,           Subjective   General:  OT Last Visit  OT Received On: 25  General  Reason for Referral: lymphedema  Referred By: MD Parth  General Comment: visit 4, no auth [place], : NO AUTH, 96% [place], 1500 OOP, [employer] MCR, onset 2025 [place]  Precautions:  Precautions Comment: LRAD for community outings, falls risk due to syncope events  Pain Assessment:  Pain Assessment  Pain Assessment: 0-10  0-10 (Numeric) Pain Score: 0 - No pain    Objective       Therapy/Activity: Therapeutic Exercise  Therapeutic Exercise Performed: Yes  Therapeutic Exercise Activity 1: knee flexion and knee extension 10 reps    Therapeutic Activity  Therapeutic Activity Performed: Yes  Therapeutic Activity 1: custom fit reduction wraps due to swelling reduction on BLES    Manual Therapy  Manual Therapy Performed: Yes  Manual Therapy Activity 1: girth measurements with volume reduction on BLEs  Manual Therapy Activity 2: hivamat with MLD in order to promote  lymph flow  Sensory:     Cognitive Skill Development:     Community/Work Reintegration Training:     Community Mobility:     Vision:     Strength:     Other Activity:     Home environment:     Lymphedema Assessment    Lymphedema Assessments:  Lymphedema Assessments: Lower extremities  Right Upper Extremity:     Left Upper Extremity:     UE Skin Appearance/Condition and Girth:     Upper Extremity Evaluation:     Right Lower Extremity:  R Metatarsal (cm): 22 cm  R Heel Y Angle: 35 cm  R Ankle  (cm): 29 cm  R 10 cm Above Ankle (cm): 42 cm  R 20 cm Above Ankle (cm): 56 cm  R 30 cm Above Ankle (cm): 59 cm  R 40 cm Above Ankle (cm): 0 cm  R Knee (cm): 61 cm  R 10 cm Above Knee (cm): 77 cm  R 20 cm Above Knee (cm): 81 cm  R 30 cm Above Knee (cm): 81.5 cm  R 40 cm Above Knee (cm): 0 cm  R 50 cm Above Knee (cm): 0 cm  Right lower extremity total: 543.5  Left Lower Extremity:  L Metatarsal (cm): 21.5 cm  L Heel Y Angle: 36 cm  L Ankle (cm): 31 cm  L 10 cm Above Ankle (cm): 41 cm  L 20 cm Above Ankle (cm): 55 cm  L 30 cm Above Ankle (cm): 61 cm  L 40 cm Above Ankle (cm): 0 cm  L Knee (cm): 59 cm  L 10 cm Above Knee (cm): 78 cm  L 20 cm Above Knee (cm): 82 cm  L 30 cm Above Knee (cm): 81.5 cm  L 40 cm Above Knee (cm): 0 cm  L 50 cm Above Knee (cm): 0 cm  Left Lower extremity total: 546  LE Skin Appearance/Condition and Girth:     Lower Extremity Evaluation:     Head and Neck Measurements:     Head and Neck Appearance:     Trunk Skin Appearance/Condition and Girth:      Genital Skin Appearance/Condition and Girth:     Home Living:     ADL:     Prior Function:     Pain Assessment:  Pain Assessment: 0-10  0-10 (Numeric) Pain Score: 0 - No pain  Therapy Precautions:  Precautions Comment: LRAD for community outings, falls risk due to syncope events      OP EDUCATION:       Goals:  Active       Lymphedema       Pt will demo 2-5% cm  of volume reduction with BLE in order for proper fitting of medical compression garments and increase ease in transfers and IADLs        Start:  04/04/25    Expected End:  07/25/25            LTG - Doning and doffing of compression garment(s) per recommended wear schedule 100% of the time, will demonstrate independence by patient/caregivier       Start:  04/04/25    Expected End:  07/25/25            Pt will complete leisure activity for 30+ minutes with no more than 2 rest break with LRAD as displayed by reduction of swelling in BLES        Start:  04/04/25    Expected End:  07/25/25             Pt will improve strengthening in order to perform self-care, household, work and/or leisure tasks. 4/5 mmt BLES        Start:  04/04/25    Expected End:  07/25/25

## 2025-05-23 NOTE — PROGRESS NOTES
Occupational Therapy    Occupational Therapy Treatment    Name: Ariana Wills  MRN: [id number]  : 1955  Date: 25    Time Entry:                            Assessment:     Plan:       Subjective   General:  OT Last Visit  OT Received On: 25  General  Reason for Referral: lymphedema  Referred By: MD Parth  General Comment: visit 2, no auth [place], : NO AUTH, 96% [place], 1500 OOP, [employer] MCR, onset 2025 [place]  Precautions:  Precautions Comment: LRAD for community outings, falls risk due to syncope events  Pain Assessment:  Pain Assessment  Pain Assessment: 0-10  0-10 (Numeric) Pain Score: 0 - No pain    Objective       Therapy/Activity:              Sensory:     Cognitive Skill Development:     Community/Work Reintegration Training:     Community Mobility:     Vision:     Strength:     Other Activity:     Home environment:     Lymphedema Assessment    Lymphedema Assessments:  Lymphedema Assessments: Lower extremities  Right Upper Extremity:     Left Upper Extremity:     UE Skin Appearance/Condition and Girth:     Upper Extremity Evaluation:     Right Lower Extremity:  R Metatarsal (cm): 22 cm  R Heel Y Angle: 35 cm  R Ankle (cm): 30 cm  R 10 cm Above Ankle (cm): 43 cm  R 20 cm Above Ankle (cm): 56 cm  R 30 cm Above Ankle (cm): 61 cm  R 40 cm Above Ankle (cm): 0 cm  R Knee (cm): 61 cm  R 10 cm Above Knee (cm): 77.8 cm  R 20 cm Above Knee (cm): 82 cm  R 30 cm Above Knee (cm): 85.5 cm  R 40 cm Above Knee (cm): 0 cm  R 50 cm Above Knee (cm): 0 cm  Right lower extremity total: 553.3  Left Lower Extremity:  L Metatarsal (cm): 21.5 cm  L Heel Y Angle: 36 cm  L Ankle (cm): 31.5 cm  L 10 cm Above Ankle (cm): 45.5 cm  L 20 cm Above Ankle (cm): 57.5 cm  L 30 cm Above Ankle (cm): 62.5 cm  L 40 cm Above Ankle (cm): 0 cm  L Knee (cm): 60 cm  L 10 cm Above Knee (cm): 79.5 cm  L 20 cm Above Knee (cm): 82.5 cm  L 30 cm Above Knee (cm): 82.5 cm  L 40 cm Above Knee (cm): 0 cm  L 50 cm Above Knee (cm): 0  What Type Of Note Output Would You Prefer (Optional)?: Bullet Format Hpi Title: Evaluation of a Skin Lesion cm  Left Lower extremity total: 559  LE Skin Appearance/Condition and Girth:     Lower Extremity Evaluation:     Head and Neck Measurements:     Head and Neck Appearance:     Trunk Skin Appearance/Condition and Girth:      Genital Skin Appearance/Condition and Girth:     Home Living:     ADL:     Prior Function:     Pain Assessment:  Pain Assessment: 0-10  0-10 (Numeric) Pain Score: 0 - No pain  Therapy Precautions:  Precautions Comment: LRAD for community outings, falls risk due to syncope events    {Spine,UE,LE,HAND,LYMPHEDEMA:33609}    Outcome Measures:  {OT Outcome Measures:23618}    OP EDUCATION:       Goals:  Active       Lymphedema       Pt will demo 2-5% cm  of volume reduction with BLE in order for proper fitting of medical compression garments and increase ease in transfers and IADLs        Start:  04/04/25    Expected End:  07/25/25            LTG - Doning and doffing of compression garment(s) per recommended wear schedule 100% of the time, will demonstrate independence by patient/caregivier       Start:  04/04/25    Expected End:  07/25/25            Pt will complete leisure activity for 30+ minutes with no more than 2 rest break with LRAD as displayed by reduction of swelling in BLES        Start:  04/04/25    Expected End:  07/25/25            Pt will improve strengthening in order to perform self-care, household, work and/or leisure tasks. 4/5 mmt BLES        Start:  04/04/25    Expected End:  07/25/25                         How Severe Are Your Spot(S)?: moderate Have Your Spot(S) Been Treated In The Past?: has not been treated

## 2025-05-28 ENCOUNTER — PATIENT MESSAGE (OUTPATIENT)
Dept: DERMATOLOGY | Facility: CLINIC | Age: 70
End: 2025-05-28
Payer: MEDICARE

## 2025-05-30 ENCOUNTER — TREATMENT (OUTPATIENT)
Dept: OCCUPATIONAL THERAPY | Facility: CLINIC | Age: 70
End: 2025-05-30
Payer: MEDICARE

## 2025-05-30 DIAGNOSIS — I89.0 LYMPHEDEMA OF BOTH LOWER EXTREMITIES: Primary | ICD-10-CM

## 2025-05-30 DIAGNOSIS — I89.0 LYMPHEDEMA: ICD-10-CM

## 2025-05-30 DIAGNOSIS — R60.9 LIPEDEMA: ICD-10-CM

## 2025-05-30 PROCEDURE — 97535 SELF CARE MNGMENT TRAINING: CPT | Mod: GO

## 2025-05-30 PROCEDURE — 97140 MANUAL THERAPY 1/> REGIONS: CPT | Mod: GO

## 2025-05-30 ASSESSMENT — PAIN SCALES - GENERAL: PAINLEVEL_OUTOF10: 0 - NO PAIN

## 2025-05-30 ASSESSMENT — PAIN - FUNCTIONAL ASSESSMENT: PAIN_FUNCTIONAL_ASSESSMENT: 0-10

## 2025-05-30 ASSESSMENT — ACTIVITIES OF DAILY LIVING (ADL): HOME_MANAGEMENT_TIME_ENTRY: 16

## 2025-05-30 NOTE — PROGRESS NOTES
Occupational Therapy    Occupational Therapy Treatment    Name: Ariana Wills  MRN: [id number]  : 1955  Date: 25    Time Entry:  Time Calculation  Start Time: 902  Stop Time: 957  Time Calculation (min): 55 min        OT Therapeutic Procedures Time Entry  Manual Therapy Time Entry: 39  Self Care/Home Management (ADLs) Time Entry: 16                Assessment:  increased tissue pliability      Plan: Continue with POC as tolerated, monitor home program, assess new garments,           Subjective   General:  OT Last Visit  OT Received On: 25  General  Reason for Referral: lymphedema  Referred By: MD Parth  General Comment: visit 5, no auth [place], : NO AUTH, 96% [place], 1500 OOP, [employer] MCR, onset 2025 [place]  Precautions:  Precautions Comment: LRAD for community outings, falls risk due to syncope events  Pain Assessment:  Pain Assessment  Pain Assessment: 0-10  0-10 (Numeric) Pain Score: 0 - No pain    Objective       Therapy/Activity: Therapeutic Exercise  Therapeutic Exercise Performed: Yes    Therapeutic Activity  Therapeutic Activity Performed: Yes  Therapeutic Activity 1: assessed custom velcro wraps for fitting with no fitting adjustments needed    Manual Therapy  Manual Therapy Performed: Yes  Manual Therapy Activity 1: girth measurements with volume reduction on RLE and LLE with marginal changes noted  Manual Therapy Activity 2: hivamat with MLD in order to promote lymph flow  Sensory:     Cognitive Skill Development:     Community/Work Reintegration Training:     Community Mobility:     Vision:     Strength:     Other Activity:     Home environment:     Lymphedema Assessment    Lymphedema Assessments:  Lymphedema Assessments: Lower extremities  Right Upper Extremity:     Left Upper Extremity:     UE Skin Appearance/Condition and Girth:     Upper Extremity Evaluation:     Right Lower Extremity:  R Metatarsal (cm): 21 cm  R Heel Y Angle: 35 cm  R Ankle (cm): 28.7 cm  R 10 cm Above  Ankle (cm): 39.5 cm  R 20 cm Above Ankle (cm): 55.5 cm  R 30 cm Above Ankle (cm): 59.5 cm  R 40 cm Above Ankle (cm): 0 cm  R Knee (cm): 61 cm  R 10 cm Above Knee (cm): 77.5 cm  R 20 cm Above Knee (cm): 80 cm  R 30 cm Above Knee (cm): 82.5 cm  R 40 cm Above Knee (cm): 0 cm  R 50 cm Above Knee (cm): 0 cm  Right lower extremity total: 540.2  Left Lower Extremity:  L Metatarsal (cm): 22 cm  L Heel Y Angle: 35.5 cm  L Ankle (cm): 30 cm  L 10 cm Above Ankle (cm): 40.5 cm  L 20 cm Above Ankle (cm): 55 cm  L 30 cm Above Ankle (cm): 61 cm  L 40 cm Above Ankle (cm): 0 cm  L Knee (cm): 60.5 cm  L 10 cm Above Knee (cm): 79 cm  L 20 cm Above Knee (cm): 82 cm  L 30 cm Above Knee (cm): 83 cm  L 40 cm Above Knee (cm): 0 cm  L 50 cm Above Knee (cm): 0 cm  Left Lower extremity total: 548.5  LE Skin Appearance/Condition and Girth:     Lower Extremity Evaluation:     Head and Neck Measurements:     Head and Neck Appearance:     Trunk Skin Appearance/Condition and Girth:      Genital Skin Appearance/Condition and Girth:     Home Living:     ADL:     Prior Function:     Pain Assessment:  Pain Assessment: 0-10  0-10 (Numeric) Pain Score: 0 - No pain  Therapy Precautions:  Precautions Comment: LRAD for community outings, falls risk due to syncope events      OP EDUCATION: edu on performing self MLD daily to assist with lymph flow and volume reduction        Goals:  Active       Lymphedema       Pt will demo 2-5% cm  of volume reduction with BLE in order for proper fitting of medical compression garments and increase ease in transfers and IADLs        Start:  04/04/25    Expected End:  07/25/25            LTG - Doning and doffing of compression garment(s) per recommended wear schedule 100% of the time, will demonstrate independence by patient/caregivier       Start:  04/04/25    Expected End:  07/25/25            Pt will complete leisure activity for 30+ minutes with no more than 2 rest break with LRAD as displayed by reduction of swelling  in BLES        Start:  04/04/25    Expected End:  07/25/25            Pt will improve strengthening in order to perform self-care, household, work and/or leisure tasks. 4/5 mmt BLES        Start:  04/04/25    Expected End:  07/25/25

## 2025-06-05 ENCOUNTER — APPOINTMENT (OUTPATIENT)
Dept: DERMATOLOGY | Facility: CLINIC | Age: 70
End: 2025-06-05
Payer: MEDICARE

## 2025-06-05 DIAGNOSIS — D48.5 NEOPLASM OF UNCERTAIN BEHAVIOR OF SKIN: ICD-10-CM

## 2025-06-05 ASSESSMENT — DERMATOLOGY QUALITY OF LIFE (QOL) ASSESSMENT
WHAT SINGLE SKIN CONDITION LISTED BELOW IS THE PATIENT ANSWERING THE QUALITY-OF-LIFE ASSESSMENT QUESTIONS ABOUT: ROSACEA
RATE HOW BOTHERED YOU ARE BY SYMPTOMS OF YOUR SKIN PROBLEM (EG, ITCHING, STINGING BURNING, HURTING OR SKIN IRRITATION): 5
RATE HOW BOTHERED YOU ARE BY SYMPTOMS OF YOUR SKIN PROBLEM (EG, ITCHING, STINGING BURNING, HURTING OR SKIN IRRITATION): 5
DATE THE QUALITY-OF-LIFE ASSESSMENT WAS COMPLETED: 67361
RATE HOW BOTHERED YOU ARE BY EFFECTS OF YOUR SKIN PROBLEMS ON YOUR ACTIVITIES (EG, GOING OUT, ACCOMPLISHING WHAT YOU WANT, WORK ACTIVITIES OR YOUR RELATIONSHIPS WITH OTHERS): 0 - NEVER BOTHERED
RATE HOW EMOTIONALLY BOTHERED YOU ARE BY YOUR SKIN PROBLEM (FOR EXAMPLE, WORRY, EMBARRASSMENT, FRUSTRATION): 3
RATE HOW EMOTIONALLY BOTHERED YOU ARE BY YOUR SKIN PROBLEM (FOR EXAMPLE, WORRY, EMBARRASSMENT, FRUSTRATION): 3
RATE HOW BOTHERED YOU ARE BY EFFECTS OF YOUR SKIN PROBLEMS ON YOUR ACTIVITIES (EG, GOING OUT, ACCOMPLISHING WHAT YOU WANT, WORK ACTIVITIES OR YOUR RELATIONSHIPS WITH OTHERS): 0 - NEVER BOTHERED
WHAT SINGLE SKIN CONDITION LISTED BELOW IS THE PATIENT ANSWERING THE QUALITY-OF-LIFE ASSESSMENT QUESTIONS ABOUT: ROSACEA

## 2025-06-05 NOTE — PROGRESS NOTES
Subjective     Ariana Wills is a 69 y.o. female who presents for the following: Pre-op Exam (Right upper eyelid, NUB).     Intake Questions  Do you have any new or changing Lesions?: (Patient-Rptd) (P) Yes  Where are these new or changing lesion(s) located?: (Patient-Rptd) (P) rt upper eyelid  Have you had or do you have a Staph Infection?: (Patient-Rptd) (P) No  Have you had or do you have Vacular Disease?: (Patient-Rptd) (P) No  Do you use sunscreen?: (Patient-Rptd) (P) Occasionally  Do you use a tanning bed?: (Patient-Rptd) (P) No  Are you trying to get pregnant?: (Patient-Rptd) (P) No  Are you on birth control?: (Patient-Rptd) (P) No  Do you have irregular menstrual cycles?: (Patient-Rptd) (P) No    Review of Systems:  No other skin or systemic complaints other than what is documented elsewhere in the note.    The following portions of the chart were reviewed this encounter and updated as appropriate:          Skin Cancer History  Biopsy Log Book  No skin cancers from Specimen Tracking.    Additional History      Specialty Problems          Dermatology Problems    Rosacea        Objective   Well appearing patient in no apparent distress; mood and affect are within normal limits.    A focused skin examination was performed. All findings within normal limits unless otherwise noted below.    Assessment/Plan   Skin Exam  1. NEOPLASM OF UNCERTAIN BEHAVIOR OF SKIN  Right Upper Eyelid      Lesion biopsy  Type of biopsy: tangential    Informed consent: discussed and consent obtained    Timeout: patient name, date of birth, surgical site, and procedure verified    Procedure prep:  Patient was prepped and draped  Anesthesia: the lesion was anesthetized in a standard fashion    Anesthetic:  1% lidocaine w/ epinephrine 1-100,000 local infiltration  Instrument used: DermaBlade    Hemostasis achieved with: aluminum chloride    Outcome: patient tolerated procedure well    Post-procedure details: sterile dressing applied  and wound care instructions given    Dressing type: petrolatum and bandage    Specimen 1 - Dermatopathology- DERM LAB  Differential Diagnosis: Scar vs BCC  Check Margins No    Comments:    Dermpath Lab: Routine Histopathology (formalin-fixed tissue)    Patient had BCC removed 14 years ago from right upper eyelid. BCC recurrence two years after initial procedure. Treated with Moh's surgery. Patient presents to Dr. Contreras 4/14/2025 with concerns of recurrence. Patient seen today in clinic per patient the lesion has decreased in size. Lesion biopsied today.     Jluis Duran MD, PhD

## 2025-06-06 ENCOUNTER — TREATMENT (OUTPATIENT)
Dept: OCCUPATIONAL THERAPY | Facility: CLINIC | Age: 70
End: 2025-06-06
Payer: MEDICARE

## 2025-06-06 DIAGNOSIS — I89.0 LYMPHEDEMA OF BOTH LOWER EXTREMITIES: Primary | ICD-10-CM

## 2025-06-06 DIAGNOSIS — I89.0 LYMPHEDEMA: ICD-10-CM

## 2025-06-06 DIAGNOSIS — R60.9 LIPEDEMA: ICD-10-CM

## 2025-06-06 PROCEDURE — 97535 SELF CARE MNGMENT TRAINING: CPT | Mod: GO

## 2025-06-06 PROCEDURE — 97140 MANUAL THERAPY 1/> REGIONS: CPT | Mod: GO

## 2025-06-06 ASSESSMENT — ACTIVITIES OF DAILY LIVING (ADL): HOME_MANAGEMENT_TIME_ENTRY: 29

## 2025-06-06 ASSESSMENT — PAIN SCALES - GENERAL: PAINLEVEL_OUTOF10: 5 - MODERATE PAIN

## 2025-06-06 ASSESSMENT — PAIN DESCRIPTION - DESCRIPTORS: DESCRIPTORS: SORE

## 2025-06-06 ASSESSMENT — PAIN - FUNCTIONAL ASSESSMENT: PAIN_FUNCTIONAL_ASSESSMENT: 0-10

## 2025-06-06 NOTE — PROGRESS NOTES
"Occupational Therapy    Occupational Therapy Treatment    Name: Ariana Wills  MRN: [id number]  : 1955  Date: 25    Time Entry:  Time Calculation  Start Time: 901  Stop Time: 956  Time Calculation (min): 55 min        OT Therapeutic Procedures Time Entry  Manual Therapy Time Entry:   Self Care/Home Management (ADLs) Time Entry:                 Assessment:  garment fitting     Plan: Continue with POC as tolerated, monitor home program, assess new garments,       Pt is phase 2, stage 3 secondary lymphedema and needs constant management of lymphedema with BLES.  Pt requires custom made garments due to unstable wall stability in BLEs, the skin tissue is requiring knitting pattern due larger foot and ankle and unique shape of calf therefore pt can not wear RTW  Pt will require class II 20-30 mmhg with silicone band 5 cm at top of garment.        Subjective  \" my legs are sore from water exercises\"   General:  OT Last Visit  OT Received On: 25  General  Reason for Referral: lymphedema  Referred By: MD Parth  General Comment: visit 6, no auth [place], : NO AUTH, 96% [place], [place], 1500 OOP, [employer] MCR, onset 2025 [place]  Precautions:  Precautions Comment: LRAD for community outings, falls risk due to syncope events  Pain Assessment:  Pain Assessment  Pain Assessment: 0-10  0-10 (Numeric) Pain Score: 5 - Moderate pain  Pain Type: Chronic pain  Pain Orientation: Right, Left  Pain Descriptors: Sore    Objective       Therapy/Activity:      Therapeutic Activity  Therapeutic Activity Performed: Yes  Therapeutic Activity 1: completed custom measuring for day time garments    Manual Therapy  Manual Therapy Performed: Yes  Manual Therapy Activity 1: girth measurements with marginal changes with BLEs  Sensory:     Cognitive Skill Development:     Community/Work Reintegration Training:     Community Mobility:     Vision:     Strength:     Other Activity:     Home environment:     Lymphedema " Assessment    Lymphedema Assessments:  Lymphedema Assessments: Lower extremities  Right Upper Extremity:     Left Upper Extremity:     UE Skin Appearance/Condition and Girth:     Upper Extremity Evaluation:     Right Lower Extremity:  R Metatarsal (cm): 21.5 cm  R Heel Y Angle: 35 cm  R Ankle (cm): 29 cm  R 10 cm Above Ankle (cm): 40.5 cm  R 20 cm Above Ankle (cm): 55.5 cm  R 30 cm Above Ankle (cm): 58.5 cm  R 40 cm Above Ankle (cm): 0 cm  R Knee (cm): 60.5 cm  R 10 cm Above Knee (cm): 77 cm  R 20 cm Above Knee (cm): 80 cm  R 30 cm Above Knee (cm): 82.5 cm  R 40 cm Above Knee (cm): 0 cm  R 50 cm Above Knee (cm): 0 cm  Right lower extremity total: 540  Left Lower Extremity:  L Metatarsal (cm): 22.3 cm  L Heel Y Angle: 36 cm  L Ankle (cm): 31 cm  L 10 cm Above Ankle (cm): 40 cm  L 20 cm Above Ankle (cm): 56 cm  L 30 cm Above Ankle (cm): 62.3 cm  L 40 cm Above Ankle (cm): 0 cm  L Knee (cm): 59 cm  L 10 cm Above Knee (cm): 79 cm  L 20 cm Above Knee (cm): 81 cm  L 30 cm Above Knee (cm): 83.5 cm  L 40 cm Above Knee (cm): 0 cm  L 50 cm Above Knee (cm): 0 cm  Left Lower extremity total: 550.1  LE Skin Appearance/Condition and Girth:     Lower Extremity Evaluation:     Head and Neck Measurements:     Head and Neck Appearance:     Trunk Skin Appearance/Condition and Girth:      Genital Skin Appearance/Condition and Girth:     Home Living:     ADL:     Prior Function:     Pain Assessment:  Pain Assessment: 0-10  0-10 (Numeric) Pain Score: 5 - Moderate pain  Pain Type: Chronic pain  Pain Orientation: Right, Left  Pain Descriptors: Sore  Therapy Precautions:  Precautions Comment: LRAD for community outings, falls risk due to syncope events    OP EDUCATION: lymph flow        Goals:  Active       Lymphedema       Pt will demo 2-5% cm  of volume reduction with BLE in order for proper fitting of medical compression garments and increase ease in transfers and IADLs        Start:  04/04/25    Expected End:  07/25/25            LTG -  Doning and doffing of compression garment(s) per recommended wear schedule 100% of the time, will demonstrate independence by patient/caregivier       Start:  04/04/25    Expected End:  07/25/25            Pt will complete leisure activity for 30+ minutes with no more than 2 rest break with LRAD as displayed by reduction of swelling in BLES        Start:  04/04/25    Expected End:  07/25/25            Pt will improve strengthening in order to perform self-care, household, work and/or leisure tasks. 4/5 mmt BLES        Start:  04/04/25    Expected End:  07/25/25

## 2025-06-12 LAB
LABORATORY COMMENT REPORT: NORMAL
PATH REPORT.FINAL DX SPEC: NORMAL
PATH REPORT.GROSS SPEC: NORMAL
PATH REPORT.MICROSCOPIC SPEC OTHER STN: NORMAL
PATH REPORT.RELEVANT HX SPEC: NORMAL
PATH REPORT.TOTAL CANCER: NORMAL

## 2025-06-13 ENCOUNTER — TREATMENT (OUTPATIENT)
Dept: OCCUPATIONAL THERAPY | Facility: CLINIC | Age: 70
End: 2025-06-13
Payer: MEDICARE

## 2025-06-13 DIAGNOSIS — I89.0 LYMPHEDEMA OF BOTH LOWER EXTREMITIES: Primary | ICD-10-CM

## 2025-06-13 DIAGNOSIS — R60.9 LIPEDEMA: ICD-10-CM

## 2025-06-13 DIAGNOSIS — I89.0 LYMPHEDEMA: ICD-10-CM

## 2025-06-13 PROCEDURE — 97140 MANUAL THERAPY 1/> REGIONS: CPT | Mod: GO

## 2025-06-13 PROCEDURE — 97535 SELF CARE MNGMENT TRAINING: CPT | Mod: GO

## 2025-06-13 ASSESSMENT — ACTIVITIES OF DAILY LIVING (ADL): HOME_MANAGEMENT_TIME_ENTRY: 23

## 2025-06-13 ASSESSMENT — PAIN - FUNCTIONAL ASSESSMENT: PAIN_FUNCTIONAL_ASSESSMENT: 0-10

## 2025-06-13 ASSESSMENT — PAIN DESCRIPTION - DESCRIPTORS: DESCRIPTORS: SORE

## 2025-06-13 ASSESSMENT — PAIN SCALES - GENERAL: PAINLEVEL_OUTOF10: 6

## 2025-06-13 NOTE — PROGRESS NOTES
"Occupational Therapy    Occupational Therapy Treatment    Name: Ariana Wills  MRN: [id number]  : 1955  Date: 25    Time Entry:  Time Calculation  Start Time: 09  Stop Time: 1002  Time Calculation (min): 62 min        OT Therapeutic Procedures Time Entry  Self Care/Home Management (ADLs) Time Entry: 23  Manual Therapy Time Entry: 39                Assessment:  increased tissue pliability, garment education      Plan: Continue with POC as tolerated, monitor home program, assess new garments,           Subjective  \" Susan been wearing my compression jaycee\"   General:  OT Last Visit  OT Received On: 25  General  Reason for Referral: lymphedema  Referred By: MD Parth  General Comment: visit 7, no auth [place], : NO AUTH, 96% [place], [place], 1500 OOP, [employer] MCR, onset 2025 [place]  Precautions:  Precautions Comment: LRAD for community outings, falls risk due to syncope events  Pain Assessment:  Pain Assessment  Pain Assessment: 0-10  0-10 (Numeric) Pain Score: 6  Pain Type: Chronic pain  Pain Orientation: Right, Left  Pain Descriptors: Sore    Objective       Therapy/Activity: Therapeutic Exercise  Therapeutic Exercise Performed: Yes    Therapeutic Activity  Therapeutic Activity Performed: Yes  Therapeutic Activity 1: Pt arrived with new compression jaycee with pt able to flower/doff with no issues.  edu pt on reducing wrinkles at knee site for lymph flow    Manual Therapy  Manual Therapy Performed: Yes  Manual Therapy Activity 1: girth measurements with volume reduciton noted with BLEs  Manual Therapy Activity 2: hivamat with MLD on BLEs for lymph flow  Sensory:     Cognitive Skill Development:     Community/Work Reintegration Training:     Community Mobility:     Vision:     Strength:     Other Activity:     Home environment:     Lymphedema Assessment    Lymphedema Assessments:  Lymphedema Assessments: Lower extremities  Right Upper Extremity:     Left Upper Extremity:     UE Skin Appearance/Condition " and Girth:     Upper Extremity Evaluation:     Right Lower Extremity:  R Metatarsal (cm): 20.7 cm  R Heel Y Angle: 35 cm  R Ankle (cm): 29 cm  R 10 cm Above Ankle (cm): 41 cm  R 20 cm Above Ankle (cm): 56 cm  R 30 cm Above Ankle (cm): 59.5 cm  R 40 cm Above Ankle (cm): 0 cm  R Knee (cm): 60 cm  R 10 cm Above Knee (cm): 77.5 cm  R 20 cm Above Knee (cm): 81 cm  R 30 cm Above Knee (cm): 83 cm  R 40 cm Above Knee (cm): 0 cm  R 50 cm Above Knee (cm): 0 cm  Right lower extremity total: 542.7  Left Lower Extremity:  L Metatarsal (cm): 21.3 cm  L Heel Y Angle: 35.5 cm  L Ankle (cm): 30 cm  L 10 cm Above Ankle (cm): 42.5 cm  L 20 cm Above Ankle (cm): 57 cm  L 30 cm Above Ankle (cm): 60.5 cm  L 40 cm Above Ankle (cm): 0 cm  L Knee (cm): 59.5 cm  L 10 cm Above Knee (cm): 79 cm  L 20 cm Above Knee (cm): 80.5 cm  L 30 cm Above Knee (cm): 82.5 cm  L 40 cm Above Knee (cm): 0 cm  L 50 cm Above Knee (cm): 0 cm  Left Lower extremity total: 548.3  LE Skin Appearance/Condition and Girth:     Lower Extremity Evaluation:     Head and Neck Measurements:     Head and Neck Appearance:     Trunk Skin Appearance/Condition and Girth:      Genital Skin Appearance/Condition and Girth:     Home Living:     ADL:     Prior Function:     Pain Assessment:  Pain Assessment: 0-10  0-10 (Numeric) Pain Score: 6  Pain Type: Chronic pain  Pain Orientation: Right, Left  Pain Descriptors: Sore  Therapy Precautions:  Precautions Comment: LRAD for community outings, falls risk due to syncope events      OP EDUCATION: wear schedule with compression jaycee and velcro wraps for reduction       Goals:  Active       Lymphedema       Pt will demo 2-5% cm  of volume reduction with BLE in order for proper fitting of medical compression garments and increase ease in transfers and IADLs        Start:  04/04/25    Expected End:  07/25/25            LTG - Doning and doffing of compression garment(s) per recommended wear schedule 100% of the time, will demonstrate  independence by patient/caregivier       Start:  04/04/25    Expected End:  07/25/25            Pt will complete leisure activity for 30+ minutes with no more than 2 rest break with LRAD as displayed by reduction of swelling in BLES        Start:  04/04/25    Expected End:  07/25/25            Pt will improve strengthening in order to perform self-care, household, work and/or leisure tasks. 4/5 mmt BLES        Start:  04/04/25    Expected End:  07/25/25

## 2025-06-18 NOTE — PROGRESS NOTES
Subjective   Patient ID: Ariana Wills is a 70 y.o. female    HPI  70 y.o. female who presenting for 6 month follow-up visit with a worsening bladder issue over the past year. She reports that every time she stands up, she experiences a sudden urge to urinate, often resulting in leakage. This urgency has been present for a while but has significantly worsened recently. She mentions that she does not wet the bed at night unless she tries to stand up. The patient has tried Kegel exercises without success. She denies recurrent urinary tract infections (UTIs) and has had only one bladder infection in the past. She has no history of hematuria. The patient has a history of arthritis, which has also worsened over the past year. She grew up with parents who smoked but does not currently smoke herself.    She reports taking oxybutynin morning and night. She expresses it helping in the beginning, however reports it is no longer helping with her symptoms. She reports during her pre op for a orthopedic surgery on 12/18/2024, it was revealed she has some cardiac problems which caused them to reschedule surgery until clearance from cardiology. She does drink caffeine.     Today, she reports an improvement in her urinary symptoms. She does notes some occasional urinary urgency. She is taking Myrbetriq 50 mg once daily.       Review of Systems    All systems were reviewed. Anything negative was noted in the HPI.    Objective   Physical Exam    General: Well developed, well nourished, alert and cooperative, appears in no acute distress   Eyes: Non-injected conjunctiva, sclera clear, no proptosis   Cardiac: Extremities are warm and well perfused. No edema, cyanosis or pallor   Lungs: Breathing is easy, non-labored. Speaking in clear and complete sentences. Normal diaphragmatic movement   MSK: Ambulatory with steady gait, unassisted   Neuro: Alert and oriented to person, place, and time   Psych: Demonstrates good judgment and  reason, without hallucinations, abnormal affect or abnormal behaviors   Skin: No obvious lesions, no rashes       No CVA tenderness bilaterally   No suprapubic pain or discomfort       Past Medical History:   Diagnosis Date    Abnormal weight gain 05/04/2016    Weight gain    Anemia     Anxiety     Arthritis     Candidiasis, unspecified 05/22/2015    Yeast infection    Cellulitis, unspecified 09/07/2014    Cellulitis    Chronic pain disorder     CKD (chronic kidney disease)     Colon polyp     Delayed emergence from general anesthesia     Depression     Diverticulosis     Dizziness     Encounter for screening for malignant neoplasm of cervix 05/22/2015    Pap smear for cervical cancer screening    Encounter for screening mammogram for malignant neoplasm of breast 05/22/2015    Visit for screening mammogram    Encounter for screening mammogram for malignant neoplasm of breast 05/22/2015    Visit for screening mammogram    Furuncle of buttock 09/18/2019    Boil, buttock    GERD (gastroesophageal reflux disease)     Hereditary and idiopathic neuropathy, unspecified 09/29/2013    Idiopathic peripheral neuropathy    Hypertension     Hypothyroid     Hypothyroidism     Liver disease     Lymphedema     Lymphedema, not elsewhere classified 05/22/2015    Lymphedema    BOYER (nonalcoholic steatohepatitis)     Obesity     Other symptoms and signs involving the musculoskeletal system 05/03/2016    Complaints of leg weakness    Pain in left hip 05/03/2016    Hip pain, acute, left    Personal history of other diseases of the nervous system and sense organs     History of peripheral neuropathy    Personal history of other diseases of the respiratory system 04/10/2015    History of acute bronchitis    Personal history of other diseases of the respiratory system 06/22/2015    History of sinusitis    Personal history of other specified conditions 06/28/2016    History of dizziness    Personal history of other specified conditions  05/22/2015    History of chest pain    Sleep apnea     Spinal stenosis     Unspecified asthma with (acute) exacerbation ([facility]) 05/03/2016    Asthma exacerbation    Vertigo     Weakness of trunk musculature 11/01/2023       Past Surgical History:   Procedure Laterality Date    BLEPHAROPLASTY      CARDIAC CATHETERIZATION  04/03/2023    CARPAL TUNNEL RELEASE  09/27/2013    Neuroplasty Decompression Median Nerve At Carpal Tunnel    CHOLECYSTECTOMY  09/27/2013    Cholecystectomy    COLONOSCOPY      DILATION AND CURETTAGE OF UTERUS           Assessment/Plan   Overactive bladder (OAB) on Myrbetriq, Urinary incontinence, likely stress incontinence well controlled     70 y.o. female who presents for the above condition, We had a very long and extensive discussion with the patient regarding the pathophysiology, differential diagnosis, risk factor, management, natural history, incidence and diagnostic work-up of the condition.        - Avoid caffeine entirely     - Advised pt to stop fluid intake at 6 pm daily.    - Implement a timed voiding schedule (every 2 to 2.5 hours).    - Discussed the importance of controlling fluid intake and avoiding caffeine.    - Advised on the potential need for cystoscopy if symptoms persist.      Plan:  - Referred to PFPT  - Urine Culture today  - Follow-up in 1 year       E&M visit today is associated with current or anticipated ongoing medical care services related to a patient's single, serious condition or a complex condition.     6/19/2025    Scribe Attestation  By signing my name below, IMatilde Scribe attest that this documentation has been prepared under the direction and in the presence of Dr. Perez Wild.

## 2025-06-19 ENCOUNTER — OFFICE VISIT (OUTPATIENT)
Dept: UROLOGY | Facility: HOSPITAL | Age: 70
End: 2025-06-19
Payer: MEDICARE

## 2025-06-19 DIAGNOSIS — R39.9 LOWER URINARY TRACT SYMPTOMS (LUTS): Primary | ICD-10-CM

## 2025-06-19 LAB
POC APPEARANCE, URINE: CLEAR
POC BILIRUBIN, URINE: NEGATIVE
POC BLOOD, URINE: NEGATIVE
POC COLOR, URINE: YELLOW
POC GLUCOSE, URINE: NEGATIVE MG/DL
POC KETONES, URINE: NEGATIVE MG/DL
POC LEUKOCYTES, URINE: NEGATIVE
POC NITRITE,URINE: NEGATIVE
POC PH, URINE: 6.5 PH
POC PROTEIN, URINE: NEGATIVE MG/DL
POC SPECIFIC GRAVITY, URINE: >=1.03
POC UROBILINOGEN, URINE: 0.2 EU/DL

## 2025-06-19 PROCEDURE — 81003 URINALYSIS AUTO W/O SCOPE: CPT | Performed by: STUDENT IN AN ORGANIZED HEALTH CARE EDUCATION/TRAINING PROGRAM

## 2025-06-19 PROCEDURE — G2211 COMPLEX E/M VISIT ADD ON: HCPCS | Performed by: STUDENT IN AN ORGANIZED HEALTH CARE EDUCATION/TRAINING PROGRAM

## 2025-06-19 PROCEDURE — 99214 OFFICE O/P EST MOD 30 MIN: CPT | Performed by: STUDENT IN AN ORGANIZED HEALTH CARE EDUCATION/TRAINING PROGRAM

## 2025-06-19 PROCEDURE — 1159F MED LIST DOCD IN RCRD: CPT | Performed by: STUDENT IN AN ORGANIZED HEALTH CARE EDUCATION/TRAINING PROGRAM

## 2025-06-20 ENCOUNTER — TREATMENT (OUTPATIENT)
Dept: OCCUPATIONAL THERAPY | Facility: CLINIC | Age: 70
End: 2025-06-20
Payer: MEDICARE

## 2025-06-20 DIAGNOSIS — I89.0 LYMPHEDEMA OF BOTH LOWER EXTREMITIES: Primary | ICD-10-CM

## 2025-06-20 DIAGNOSIS — I89.0 LYMPHEDEMA: ICD-10-CM

## 2025-06-20 DIAGNOSIS — R60.9 LIPEDEMA: ICD-10-CM

## 2025-06-20 PROCEDURE — 97140 MANUAL THERAPY 1/> REGIONS: CPT | Mod: GO

## 2025-06-20 PROCEDURE — 97110 THERAPEUTIC EXERCISES: CPT | Mod: GO

## 2025-06-20 ASSESSMENT — PAIN - FUNCTIONAL ASSESSMENT: PAIN_FUNCTIONAL_ASSESSMENT: 0-10

## 2025-06-20 ASSESSMENT — PAIN SCALES - GENERAL: PAINLEVEL_OUTOF10: 4

## 2025-06-20 ASSESSMENT — PAIN DESCRIPTION - DESCRIPTORS: DESCRIPTORS: SORE

## 2025-06-20 NOTE — PROGRESS NOTES
Occupational Therapy    Occupational Therapy Treatment    Name: Ariana Wills  MRN: [id number]  : 1955  Date: 25    Time Entry:  Time Calculation  Start Time: 901  Stop Time: 955  Time Calculation (min): 54 min        OT Therapeutic Procedures Time Entry  Therapeutic Exercise Time Entry: 11  Manual Therapy Time Entry: 43                Assessment:  lymph flow, increased tissue pliability,      Plan:  Continue with POC as tolerated, monitor home program, assess new garments,           Subjective   General:  OT Last Visit  OT Received On: 25  General  Reason for Referral: lymphedema  Referred By: MD Parth  General Comment: visit 8, no auth [place], : NO AUTH, 96% COVERAGE, [place], 1500 OOP, [employer] MCR, onset 2025 SD  Precautions:  Precautions Comment: LRAD for community outings, falls risk due to syncope events  Pain Assessment:  Pain Assessment  Pain Assessment: 0-10  0-10 (Numeric) Pain Score: 4  Pain Type: Chronic pain  Pain Location: Knee  Pain Orientation: Right, Left  Pain Descriptors: Sore    Objective       Therapy/Activity: Therapeutic Exercise  Therapeutic Exercise Performed: Yes  Therapeutic Exercise Activity 1: SCI FIT with interval resistance noted    Therapeutic Activity  Therapeutic Activity Performed: Yes  Therapeutic Activity 1: reduction garments demo good fitting this date.  no custom fitting needed    Manual Therapy  Manual Therapy Performed: Yes  Manual Therapy Activity 1: girth measurements with LLE volume increased noted this date  Manual Therapy Activity 2: hivamat with MLD in order to promote lymph flow  Sensory:     Cognitive Skill Development:     Community/Work Reintegration Training:     Community Mobility:     Vision:     Strength:     Other Activity:     Home environment:     Lymphedema Assessment    Lymphedema Assessments:  Lymphedema Assessments: Lower extremities  Right Upper Extremity:     Left Upper Extremity:     UE Skin Appearance/Condition and Girth:      Upper Extremity Evaluation:     Right Lower Extremity:  R Metatarsal (cm): 21 cm  R Heel Y Angle: 34.5 cm  R Ankle (cm): 29 cm  R 10 cm Above Ankle (cm): 42 cm  R 20 cm Above Ankle (cm): 55 cm  R 30 cm Above Ankle (cm): 60 cm  R 40 cm Above Ankle (cm): 0 cm  R Knee (cm): 60.5 cm  R 10 cm Above Knee (cm): 77.5 cm  R 20 cm Above Knee (cm): 79.5 cm  R 30 cm Above Knee (cm): 82.5 cm  R 40 cm Above Knee (cm): 0 cm  R 50 cm Above Knee (cm): 0 cm  Right lower extremity total: 541.5  Left Lower Extremity:  L Metatarsal (cm): 21.5 cm  L Heel Y Angle: 35.5 cm  L Ankle (cm): 30 cm  L 10 cm Above Ankle (cm): 41.5 cm  L 20 cm Above Ankle (cm): 57 cm  L 30 cm Above Ankle (cm): 63.5 cm  L 40 cm Above Ankle (cm): 0 cm  L Knee (cm): 59.5 cm  L 10 cm Above Knee (cm): 79 cm  L 20 cm Above Knee (cm): 83.5 cm  L 30 cm Above Knee (cm): 83 cm  L 40 cm Above Knee (cm): 0 cm  L 50 cm Above Knee (cm): 0 cm  Left Lower extremity total: 554  LE Skin Appearance/Condition and Girth:     Lower Extremity Evaluation:     Head and Neck Measurements:     Head and Neck Appearance:     Trunk Skin Appearance/Condition and Girth:      Genital Skin Appearance/Condition and Girth:     Home Living:     ADL:     Prior Function:     Pain Assessment:  Pain Assessment: 0-10  0-10 (Numeric) Pain Score: 4  Pain Type: Chronic pain  Pain Location: Knee  Pain Orientation: Right, Left  Pain Descriptors: Sore  Therapy Precautions:  Precautions Comment: LRAD for community outings, falls risk due to syncope events      OP EDUCATION:  continue HEP        Goals:  Active       Lymphedema       Pt will demo 2-5% cm  of volume reduction with BLE in order for proper fitting of medical compression garments and increase ease in transfers and IADLs        Start:  04/04/25    Expected End:  07/25/25            LTG - Doning and doffing of compression garment(s) per recommended wear schedule 100% of the time, will demonstrate independence by patient/caregivier       Start:   04/04/25    Expected End:  07/25/25            Pt will complete leisure activity for 30+ minutes with no more than 2 rest break with LRAD as displayed by reduction of swelling in BLES        Start:  04/04/25    Expected End:  07/25/25            Pt will improve strengthening in order to perform self-care, household, work and/or leisure tasks. 4/5 mmt BLES        Start:  04/04/25    Expected End:  07/25/25

## 2025-06-21 LAB — BACTERIA UR CULT: NORMAL

## 2025-06-27 ENCOUNTER — APPOINTMENT (OUTPATIENT)
Dept: OCCUPATIONAL THERAPY | Facility: CLINIC | Age: 70
End: 2025-06-27
Payer: MEDICARE

## 2025-06-27 DIAGNOSIS — I89.0 LYMPHEDEMA OF BOTH LOWER EXTREMITIES: Primary | ICD-10-CM

## 2025-06-27 DIAGNOSIS — R60.9 LIPEDEMA: ICD-10-CM

## 2025-07-02 ENCOUNTER — TREATMENT (OUTPATIENT)
Dept: OCCUPATIONAL THERAPY | Facility: CLINIC | Age: 70
End: 2025-07-02
Payer: MEDICARE

## 2025-07-02 DIAGNOSIS — R60.9 LIPEDEMA: ICD-10-CM

## 2025-07-02 DIAGNOSIS — M79.604 PAIN IN BOTH LOWER EXTREMITIES: ICD-10-CM

## 2025-07-02 DIAGNOSIS — I89.0 LYMPHEDEMA: ICD-10-CM

## 2025-07-02 DIAGNOSIS — I89.0 LYMPHEDEMA OF BOTH LOWER EXTREMITIES: Primary | ICD-10-CM

## 2025-07-02 DIAGNOSIS — M79.605 PAIN IN BOTH LOWER EXTREMITIES: ICD-10-CM

## 2025-07-02 PROCEDURE — 97535 SELF CARE MNGMENT TRAINING: CPT | Mod: GO

## 2025-07-02 PROCEDURE — 97110 THERAPEUTIC EXERCISES: CPT | Mod: GO

## 2025-07-02 PROCEDURE — 97140 MANUAL THERAPY 1/> REGIONS: CPT | Mod: GO

## 2025-07-02 ASSESSMENT — PAIN DESCRIPTION - DESCRIPTORS: DESCRIPTORS: TENDER

## 2025-07-02 ASSESSMENT — ACTIVITIES OF DAILY LIVING (ADL): HOME_MANAGEMENT_TIME_ENTRY: 13

## 2025-07-02 ASSESSMENT — PAIN SCALES - GENERAL: PAINLEVEL_OUTOF10: 3

## 2025-07-02 ASSESSMENT — PAIN - FUNCTIONAL ASSESSMENT: PAIN_FUNCTIONAL_ASSESSMENT: 0-10

## 2025-07-02 NOTE — PROGRESS NOTES
"Occupational Therapy    Occupational Therapy Treatment    Name: Ariana Wills  MRN: [id number]  : 1955  Date: 25    Time Entry:  Time Calculation  Start Time: 09  Stop Time: 956  Time Calculation (min): 56 min        OT Therapeutic Procedures Time Entry  Self Care/Home Management (ADLs) Time Entry: 13  Therapeutic Exercise Time Entry: 13  Manual Therapy Time Entry: 30                Assessment:  muscle fatigue, lymph flow, increased tissue changes with BLEs      Plan: Continue with POC as tolerated, monitor home program, assess new garments,             Subjective  \" I did a lot of walking over the weekend and traveled in the car too\"    General:     General  Reason for Referral: lymphedema  Referred By: MD Parth  General Comment: visit 9, no auth [place], : NO AUTH, 96% COVERAGE, [place], 1500 OOP, [employer]ET[employer] MCR, onset 2025 SD  Precautions:  Precautions Comment: LRAD for community outings, falls risk due to syncope events  Pain Assessment:  Pain Assessment  Pain Assessment: 0-10  0-10 (Numeric) Pain Score: 3  Pain Type: Chronic pain  Pain Location: Knee  Pain Orientation: Right, Left  Pain Descriptors: Tender    Objective       Therapy/Activity: Therapeutic Exercise  Therapeutic Exercise Performed: Yes  Therapeutic Exercise Activity 1: SCI FIT with interval resistance noted    Therapeutic Activity  Therapeutic Activity Performed: Yes  Therapeutic Activity 1: custom fitted gamrents due to reduction noted this date    Manual Therapy  Manual Therapy Performed: Yes  Manual Therapy Activity 1: girth measurements with volume reduction noted  Manual Therapy Activity 2: hivamat with MLD in order to promote lymph flow  Lymphedema Assessment    Lymphedema Assessments:  Lymphedema Assessments: Lower extremities     Right Lower Extremity:  R Metatarsal (cm): 22 cm  R Heel Y Angle: 35.5 cm  R Ankle (cm): 29 cm  R 10 cm Above Ankle (cm): 39.5 cm  R 20 cm Above Ankle (cm): 56 cm  R 30 cm Above Ankle (cm): 58.5 " cm  R 40 cm Above Ankle (cm): 0 cm  R Knee (cm): 60.5 cm  R 10 cm Above Knee (cm): 79.5 cm  R 20 cm Above Knee (cm): 80 cm  R 30 cm Above Knee (cm): 82.5 cm  R 40 cm Above Knee (cm): 0 cm  R 50 cm Above Knee (cm): 0 cm  Right lower extremity total: 543  Left Lower Extremity:  L Metatarsal (cm): 21.7 cm  L Heel Y Angle: 35.5 cm  L Ankle (cm): 30 cm  L 10 cm Above Ankle (cm): 39.5 cm  L 20 cm Above Ankle (cm): 54.5 cm  L 30 cm Above Ankle (cm): 62.5 cm  L 40 cm Above Ankle (cm): 0 cm  L Knee (cm): 59 cm  L 10 cm Above Knee (cm): 79 cm  L 20 cm Above Knee (cm): 83.5 cm  L 30 cm Above Knee (cm): 84 cm  L 40 cm Above Knee (cm): 0 cm  L 50 cm Above Knee (cm): 0 cm  Left Lower extremity total: 549.2  Pain Assessment: 0-10  0-10 (Numeric) Pain Score: 3  Pain Type: Chronic pain  Pain Location: Knee  Pain Orientation: Right, Left  Pain Descriptors: Tender  Therapy Precautions:  Precautions Comment: LRAD for community outings, falls risk due to syncope events          OP EDUCATION: continue walking activities and pump use        Goals:  Active       Lymphedema       Pt will demo 2-5% cm  of volume reduction with BLE in order for proper fitting of medical compression garments and increase ease in transfers and IADLs        Start:  04/04/25    Expected End:  07/25/25            LTG - Doning and doffing of compression garment(s) per recommended wear schedule 100% of the time, will demonstrate independence by patient/caregivier       Start:  04/04/25    Expected End:  07/25/25            Pt will complete leisure activity for 30+ minutes with no more than 2 rest break with LRAD as displayed by reduction of swelling in BLES        Start:  04/04/25    Expected End:  07/25/25            Pt will improve strengthening in order to perform self-care, household, work and/or leisure tasks. 4/5 mmt BLES        Start:  04/04/25    Expected End:  07/25/25                         97

## 2025-08-05 ENCOUNTER — EVALUATION (OUTPATIENT)
Dept: PHYSICAL THERAPY | Facility: CLINIC | Age: 70
End: 2025-08-05
Payer: MEDICARE

## 2025-08-05 DIAGNOSIS — R32 URINARY INCONTINENCE: Primary | ICD-10-CM

## 2025-08-05 DIAGNOSIS — N39.3 STRESS INCONTINENCE OF URINE: ICD-10-CM

## 2025-08-05 PROCEDURE — 97162 PT EVAL MOD COMPLEX 30 MIN: CPT | Mod: GP | Performed by: PHYSICAL THERAPIST

## 2025-08-05 PROCEDURE — 97530 THERAPEUTIC ACTIVITIES: CPT | Mod: GP | Performed by: PHYSICAL THERAPIST

## 2025-08-05 ASSESSMENT — ENCOUNTER SYMPTOMS
OCCASIONAL FEELINGS OF UNSTEADINESS: 1
LOSS OF SENSATION IN FEET: 1
DEPRESSION: 1

## 2025-08-05 NOTE — LETTER
August 8, 2025    [place]  [address]   [facility]  [facility] [address]    Patient: Ariana Wills   YOB: 1955   Date of Visit: 8/5/2025       Dear Perez Wild MD MPH  [address] [place] [address]    The attached plan of care is being sent to you because your patient’s medical reimbursement requires that you certify the plan of care. Your signature is required to allow uninterrupted insurance coverage.      You may indicate your approval by signing below and faxing this form back to us at Dept Fax: [phone].    Please call Dept: [phone] with any questions or concerns.    Thank you for this referral,        Rosalina Trejo PT  [facility] [address] [facility]  [address] [facility] [address]    Payer: Payor: [employer] MEDICARE / Plan: [employer] MEDICARE ASSURE / Product Type: *No Product type* /                                                                         Date:     Dear Rosalina Trejo, PT,     Re: Ms. Ariana Wills, MRN:[id number]    I certify that I have reviewed the attached plan of care and it is medically necessary for Ms. Ariana Wills (1955) who is under my care.          ______________________________________                    _________________  Provider name and credentials                                           Date and time                                                                                           Plan of Care 8/5/25   Effective from: 8/5/2025  Effective to: 11/4/2025    Plan ID: [id number]            Participants as of Finalize on 8/8/2025    Name Type Comments Contact Info    Perez Wild MD MPH Referring Provider  [phone]    Rosalina Trejo PT Physical Therapist  [phone]       Last Plan Note     Author: Rosalina Trejo PT Status: Incomplete Last edited: 8/5/2025  9:00 AM       Physical Therapy    PELVIC FLOOR EVALUATION AND TREATMENT    Name: Ariana ARANA  Elma  MRN: [id number]  : 1955  Today's Date: 25     Time Calculation  Start Time: 905  Stop Time:   Time Calculation (min): 55 min    Assessment: The patient is a 70 y.o. female with chief complaint of urinary incontinence with sit >< stand and transfers, consistent with stress-type incontinence. Her medical history is complicated by chronic pain from neuropathy and lymphedema.  She  presents with LE weakness and knee pain which impacts her body mechanics when rising from a chair, causing increased intra-abdominal pressure. Internal assessment of the pelvic floor muscles will be completed at the next session.  She will benefit from therapy to increase strength, ability to manage increases with IAP to improve urinary control.         Plan: Internal pelvic floor assessment per patient consent and comfort level.   Treatment/Interventions: Biofeedback, Education/ Instruction, Manual therapy, Neuromuscular re-education, Self care/ home management, Therapeutic activities, Therapeutic exercises  PT Plan: Skilled PT  PT Frequency: 1 time per week  Duration: 6-8 weeks  Onset Date: 25  Certification Period Start Date: 25  Certification Period End Date: 25  Number of Treatments Authorized: per medical necessity  Rehab Potential: Good  Plan of Care Agreement: Patient    Interventions: Therapeutic Activity x 25 minutes: Lengthy patient education. See education section.     Current Problem:  1. Urinary incontinence  Follow Up In Physical Therapy      2. Stress incontinence of urine            Subjective  General  Reason for Referral: R39.9 (ICD-10-CM) - Lower urinary tract symptoms (LUTS)  Referred By: Dr. Perez Wild MD  General Comment: Visit #1, No Auth required  Precautions  STEADI Fall Risk Score (The score of 4 or more indicates an increased risk of falling): 9       Pain: chronic pain from long term neuropathy in all limbs.  Not rated       Objective  PELVIC HISTORY:  Chief  Complaint/Description of Symptoms: Has done kegels, but Is now on medication for urinary control.  The medicine helps, but, When she stands up or sitting down on the toilet urine gushes out .  Wears pads all the time.  Not always good with timed voiding.   Riding in car makes it worse.   Myrbetriqu has helped during other times.  Gradual onset for about 2 years.  Has had gall bladder removal, Has several d&c's due to miscarriages.  Had 3 vaginal births, very fast deliveries. Had an episiotomy with first. No POP, no bulging out.  Has not been to GYN in 30 years. Has neuropathy in all 4 limbs which is getting worse.  Knees hurt and Legs feel weak.  Has an appointment with new neurologist in a couple weeks. Has irritation around labia - may be due to pads.  Notes having many falls - 8-10 falls in last year, but is seeking help of neurologist.     HPI: see above  Home Environment/Social Factors/Occupation: , 1 story home, Retired.     PELVIC PAIN: no pain     Description: Feels pressure around the bladder, but no pain.   Since onset, the symptoms are: better with medication, but still an issue with getting up from sitting and sitting down to toilet  Pain when emptying bladder: No  Pain with wiping or tight clothing: irritation  Pain with intercourse: no pain in the past   History of back pain: yes     EXERCISE:  Do you do Kegels? Not regularly.    Current exercise regime: nothing regularly. Lymphedema massage and breathing. walking    BLADDER:     Excessive Urinary Urgency: sometimes, if waited too long (greater than 2 hours)   Daytime Voiding Frequency: about every 2 hours  Nighttime Voiding Frequency: every 2-4 hours, bladder wakes her up.  Unintentional urine loss frequency: multiple times per day.   Leakage occurs with: sit to stand and stand to sit on the toilet.    Leakage amount: varies, moderate amount  Difficulty initiating flow of urine: no  Slow/weak urine stream: strong  Difficulty starting urine  "stream/push to urinate: No   Able to completely empty bladder: most of the time.   Tests performed by doctor: normal post-void residual  Frequent UTI's: none    BOWEL:     Excessive Bowel Urgency: some urgency if she has eaten the wrong thing, rare (sugar free candy  or too many nuts)  BM Frequency: at least 1x per day.  Frequent Diarrhea: none  Frequent constipation/straining/incomplete emptying: no  [place] Stool Scale rating: on the softer but formed side.    Unintentional stool loss: rarely  Stool loss frequency: rarely  FI occurs with what activity:  with urinary urgency   FI amount: small  Consistency of stool when FI occurs: looser    Tests performed by doctor: has colonoscopy regularly.       POSTURE/Gait/Transfers: Shoulders rounded and forward posture, LE are edematous from lymphedema,  Gait is slow, uses cane with mod I, heavy upper extremity support for sit to stand transfers.      MMT R/L:  Hip flex: 4-/4  Hip abd: 4-/4  Hip ext: Good bridge  TA: Fair  Knee Ext: 4/5 R/L       OUTCOMES MEASURE:  PFIQ-7: 57    Therapeutic Activity: x 20 minutes Pt was educated on PT clinical findings, extensively on pelvic floor anatomy and physiology with use of 3D model  as it relates to symptoms and diagnosis, PT POC and initial HEP with oral and written instruction provided. She was instructed on bladder voiding norms.  She was educated on urge suppression technique and was provided with a printed handout.  She was education on importance of body mechanics and breathing mechanics when rising to  order to manage intraabdominal pressure. Use of \"blow before you go technique.\"     Education:  Outpatient Education  Individual(s) Educated: Patient  Education Provided: Anatomy, Home Exercise Program, Physiology, POC, Posture  Patient/Caregiver Demonstrated Understanding: yes  Plan of Care Discussed and Agreed Upon: yes  Patient Response to Education: Patient/Caregiver Verbalized Understanding of Information, " Patient/Caregiver Performed Return Demonstration of Exercises/Activities, Patient/Caregiver Asked Appropriate Questions    Careplan Goals:  Problem: PT Problem       Goal: Patient will have PFM assessment and goals will be established for strength and coordination.          Goal: Patient will report ability to control bladder without leakage with sit><stand transfers 9 out of 10 times.           Goal: Patient will increase LE and trunk strength by 1/3 MMT grade to facilitate muscle performance necessary for core contribution to control of urinary incontinence.           Goal: Patient will improve score on PFIQ-7 by 12%  to show a clinically important change in urinary control and function.           Goal: Patient will be independent and adherent to HEP to enhance functional progress and long term management of condition.              Rosalina Trejo PT         Current Participants as of 8/8/2025    Name Type Comments Contact Info    Perez Wild MD MPH Referring Provider  [phone]    Signature pending    Rosalina Trejo PT Physical Therapist  [phone]    Signature pending

## 2025-08-05 NOTE — PROGRESS NOTES
Physical Therapy    PELVIC FLOOR EVALUATION AND TREATMENT    Name: Ariana Wills  MRN: [id number]  : 1955  Today's Date: 25     Time Calculation  Start Time: 905  Stop Time:   Time Calculation (min): 55 min    Assessment: The patient is a 70 y.o. female with chief complaint of urinary incontinence with sit >< stand and transfers, consistent with stress-type incontinence. Her medical history is complicated by chronic pain from neuropathy and lymphedema.  She  presents with LE weakness and knee pain which impacts her body mechanics when rising from a chair, causing increased intra-abdominal pressure. Internal assessment of the pelvic floor muscles will be completed at the next session.  She will benefit from therapy to increase strength, ability to manage increases with IAP to improve urinary control.         Plan: Internal pelvic floor assessment per patient consent and comfort level.   Treatment/Interventions: Biofeedback, Education/ Instruction, Manual therapy, Neuromuscular re-education, Self care/ home management, Therapeutic activities, Therapeutic exercises  PT Plan: Skilled PT  PT Frequency: 1 time per week  Duration: 6-8 weeks  Onset Date: 25  Certification Period Start Date: 25  Certification Period End Date: 25  Number of Treatments Authorized: per medical necessity  Rehab Potential: Good  Plan of Care Agreement: Patient    Interventions: Therapeutic Activity x 25 minutes: Lengthy patient education. See education section.     Current Problem:  1. Urinary incontinence  Follow Up In Physical Therapy      2. Stress incontinence of urine            Subjective   General  Reason for Referral: R39.9 (ICD-10-CM) - Lower urinary tract symptoms (LUTS)  Referred By: Dr. Perez Wild MD  General Comment: Visit #1, No Auth required  Precautions  STEADI Fall Risk Score (The score of 4 or more indicates an increased risk of falling): 9       Pain: chronic pain from long term  neuropathy in all limbs.  Not rated       Objective   PELVIC HISTORY:  Chief Complaint/Description of Symptoms: Has done kegels, but Is now on medication for urinary control.  The medicine helps, but, When she stands up or sitting down on the toilet urine gushes out .  Wears pads all the time.  Not always good with timed voiding.   Riding in car makes it worse.   Myrbetriqu has helped during other times.  Gradual onset for about 2 years.  Has had gall bladder removal, Has several d&c's due to miscarriages.  Had 3 vaginal births, very fast deliveries. Had an episiotomy with first. No POP, no bulging out.  Has not been to GYN in 30 years. Has neuropathy in all 4 limbs which is getting worse.  Knees hurt and Legs feel weak.  Has an appointment with new neurologist in a couple weeks. Has irritation around labia - may be due to pads.  Notes having many falls - 8-10 falls in last year, but is seeking help of neurologist.     HPI: see above  Home Environment/Social Factors/Occupation: , 1 story home, Retired.     PELVIC PAIN: no pain     Description: Feels pressure around the bladder, but no pain.   Since onset, the symptoms are: better with medication, but still an issue with getting up from sitting and sitting down to toilet  Pain when emptying bladder: No  Pain with wiping or tight clothing: irritation  Pain with intercourse: no pain in the past   History of back pain: yes     EXERCISE:  Do you do Kegels? Not regularly.    Current exercise regime: nothing regularly. Lymphedema massage and breathing. walking    BLADDER:     Excessive Urinary Urgency: sometimes, if waited too long (greater than 2 hours)   Daytime Voiding Frequency: about every 2 hours  Nighttime Voiding Frequency: every 2-4 hours, bladder wakes her up.  Unintentional urine loss frequency: multiple times per day.   Leakage occurs with: sit to stand and stand to sit on the toilet.    Leakage amount: varies, moderate amount  Difficulty initiating flow  "of urine: no  Slow/weak urine stream: strong  Difficulty starting urine stream/push to urinate: No   Able to completely empty bladder: most of the time.   Tests performed by doctor: normal post-void residual  Frequent UTI's: none    BOWEL:     Excessive Bowel Urgency: some urgency if she has eaten the wrong thing, rare (sugar free candy  or too many nuts)  BM Frequency: at least 1x per day.  Frequent Diarrhea: none  Frequent constipation/straining/incomplete emptying: no  [place] Stool Scale rating: on the softer but formed side.    Unintentional stool loss: rarely  Stool loss frequency: rarely  FI occurs with what activity:  with urinary urgency   FI amount: small  Consistency of stool when FI occurs: looser    Tests performed by doctor: has colonoscopy regularly.       POSTURE/Gait/Transfers: Shoulders rounded and forward posture, LE are edematous from lymphedema,  Gait is slow, uses cane with mod I, heavy upper extremity support for sit to stand transfers.      MMT R/L:  Hip flex: 4-/4  Hip abd: 4-/4  Hip ext: Good bridge  TA: Fair  Knee Ext: 4/5 R/L       OUTCOMES MEASURE:  PFIQ-7: 57    Therapeutic Activity: x 20 minutes Pt was educated on PT clinical findings, extensively on pelvic floor anatomy and physiology with use of 3D model  as it relates to symptoms and diagnosis, PT POC and initial HEP with oral and written instruction provided. She was instructed on bladder voiding norms.  She was educated on urge suppression technique and was provided with a printed handout.  She was education on importance of body mechanics and breathing mechanics when rising to  order to manage intraabdominal pressure. Use of \"blow before you go technique.\"     Education:  Outpatient Education  Individual(s) Educated: Patient  Education Provided: Anatomy, Home Exercise Program, Physiology, POC, Posture  Patient/Caregiver Demonstrated Understanding: yes  Plan of Care Discussed and Agreed Upon: yes  Patient Response to " Education: Patient/Caregiver Verbalized Understanding of Information, Patient/Caregiver Performed Return Demonstration of Exercises/Activities, Patient/Caregiver Asked Appropriate Questions    Careplan Goals:  Problem: PT Problem       Goal: Patient will have PFM assessment and goals will be established for strength and coordination.          Goal: Patient will report ability to control bladder without leakage with sit><stand transfers 9 out of 10 times.           Goal: Patient will increase LE and trunk strength by 1/3 MMT grade to facilitate muscle performance necessary for core contribution to control of urinary incontinence.           Goal: Patient will improve score on PFIQ-7 by 12%  to show a clinically important change in urinary control and function.           Goal: Patient will be independent and adherent to HEP to enhance functional progress and long term management of condition.              Rosalina Trejo, PT

## 2025-08-08 PROBLEM — N39.3 STRESS INCONTINENCE OF URINE: Status: ACTIVE | Noted: 2025-08-08

## 2025-08-12 ENCOUNTER — TREATMENT (OUTPATIENT)
Dept: PHYSICAL THERAPY | Facility: CLINIC | Age: 70
End: 2025-08-12
Payer: MEDICARE

## 2025-08-12 DIAGNOSIS — R32 URINARY INCONTINENCE, UNSPECIFIED TYPE: Primary | ICD-10-CM

## 2025-08-12 DIAGNOSIS — R32 URINARY INCONTINENCE: ICD-10-CM

## 2025-08-12 PROCEDURE — 97140 MANUAL THERAPY 1/> REGIONS: CPT | Mod: GP | Performed by: PHYSICAL THERAPIST

## 2025-08-12 PROCEDURE — 97110 THERAPEUTIC EXERCISES: CPT | Mod: GP | Performed by: PHYSICAL THERAPIST

## 2025-08-19 ENCOUNTER — APPOINTMENT (OUTPATIENT)
Dept: NEUROLOGY | Facility: HOSPITAL | Age: 70
End: 2025-08-19
Payer: MEDICARE

## 2025-08-21 ENCOUNTER — OFFICE VISIT (OUTPATIENT)
Facility: HOSPITAL | Age: 70
End: 2025-08-21
Payer: MEDICARE

## 2025-08-21 VITALS — HEART RATE: 78 BPM | DIASTOLIC BLOOD PRESSURE: 88 MMHG | RESPIRATION RATE: 24 BRPM | SYSTOLIC BLOOD PRESSURE: 158 MMHG

## 2025-08-21 DIAGNOSIS — R20.2 NUMBNESS AND TINGLING SENSATION OF SKIN: ICD-10-CM

## 2025-08-21 DIAGNOSIS — G60.9 IDIOPATHIC PERIPHERAL NEUROPATHY: Primary | ICD-10-CM

## 2025-08-21 DIAGNOSIS — R20.0 NUMBNESS AND TINGLING SENSATION OF SKIN: ICD-10-CM

## 2025-08-21 DIAGNOSIS — M54.50 LOW BACK PAIN, UNSPECIFIED: ICD-10-CM

## 2025-08-21 DIAGNOSIS — M48.061 SPINAL STENOSIS, LUMBAR REGION, WITHOUT NEUROGENIC CLAUDICATION: ICD-10-CM

## 2025-08-21 DIAGNOSIS — F51.02 ADJUSTMENT INSOMNIA: ICD-10-CM

## 2025-08-21 DIAGNOSIS — G89.29 OTHER CHRONIC PAIN: ICD-10-CM

## 2025-08-21 PROCEDURE — 1159F MED LIST DOCD IN RCRD: CPT | Performed by: PHYSICAL MEDICINE & REHABILITATION

## 2025-08-21 PROCEDURE — 99214 OFFICE O/P EST MOD 30 MIN: CPT | Performed by: PHYSICAL MEDICINE & REHABILITATION

## 2025-08-21 PROCEDURE — 3079F DIAST BP 80-89 MM HG: CPT | Performed by: PHYSICAL MEDICINE & REHABILITATION

## 2025-08-21 PROCEDURE — 3077F SYST BP >= 140 MM HG: CPT | Performed by: PHYSICAL MEDICINE & REHABILITATION

## 2025-08-21 PROCEDURE — G2211 COMPLEX E/M VISIT ADD ON: HCPCS | Performed by: PHYSICAL MEDICINE & REHABILITATION

## 2025-08-21 PROCEDURE — 1125F AMNT PAIN NOTED PAIN PRSNT: CPT | Performed by: PHYSICAL MEDICINE & REHABILITATION

## 2025-08-21 RX ORDER — GABAPENTIN 300 MG/1
CAPSULE ORAL
Qty: 270 CAPSULE | Refills: 3 | Status: SHIPPED | OUTPATIENT
Start: 2025-08-21

## 2025-08-21 ASSESSMENT — PAIN SCALES - GENERAL: PAINLEVEL_OUTOF10: 7

## 2025-08-25 DIAGNOSIS — F33.2 SEVERE EPISODE OF RECURRENT MAJOR DEPRESSIVE DISORDER, WITHOUT PSYCHOTIC FEATURES (MULTI): ICD-10-CM

## 2025-08-25 RX ORDER — DULOXETIN HYDROCHLORIDE 60 MG/1
60 CAPSULE, DELAYED RELEASE ORAL DAILY
Qty: 90 CAPSULE | Refills: 0 | Status: SHIPPED | OUTPATIENT
Start: 2025-08-25

## 2025-08-26 ENCOUNTER — APPOINTMENT (OUTPATIENT)
Dept: PAIN MEDICINE | Facility: CLINIC | Age: 70
End: 2025-08-26
Payer: MEDICARE

## 2025-08-26 VITALS — HEART RATE: 79 BPM | DIASTOLIC BLOOD PRESSURE: 73 MMHG | SYSTOLIC BLOOD PRESSURE: 142 MMHG | RESPIRATION RATE: 18 BRPM

## 2025-08-26 DIAGNOSIS — M54.2 NECK PAIN: ICD-10-CM

## 2025-08-26 DIAGNOSIS — M48.062 SPINAL STENOSIS OF LUMBAR REGION WITH NEUROGENIC CLAUDICATION: Primary | ICD-10-CM

## 2025-08-26 PROCEDURE — 3078F DIAST BP <80 MM HG: CPT | Performed by: PHYSICAL MEDICINE & REHABILITATION

## 2025-08-26 PROCEDURE — 99214 OFFICE O/P EST MOD 30 MIN: CPT | Performed by: PHYSICAL MEDICINE & REHABILITATION

## 2025-08-26 PROCEDURE — G2211 COMPLEX E/M VISIT ADD ON: HCPCS | Performed by: PHYSICAL MEDICINE & REHABILITATION

## 2025-08-26 PROCEDURE — 3077F SYST BP >= 140 MM HG: CPT | Performed by: PHYSICAL MEDICINE & REHABILITATION

## 2025-08-26 PROCEDURE — 1125F AMNT PAIN NOTED PAIN PRSNT: CPT | Performed by: PHYSICAL MEDICINE & REHABILITATION

## 2025-08-26 RX ORDER — DIAZEPAM 5 MG/1
5 TABLET ORAL ONCE AS NEEDED
OUTPATIENT
Start: 2025-08-26

## 2025-08-26 SDOH — SOCIAL STABILITY: SOCIAL NETWORK: SOCIAL ACTIVITY:: 7

## 2025-08-26 ASSESSMENT — PAIN SCALES - GENERAL: PAINLEVEL_OUTOF10: 7

## 2025-08-27 ENCOUNTER — TREATMENT (OUTPATIENT)
Dept: PHYSICAL THERAPY | Facility: CLINIC | Age: 70
End: 2025-08-27
Payer: MEDICARE

## 2025-08-27 DIAGNOSIS — R32 URINARY INCONTINENCE: ICD-10-CM

## 2025-08-27 DIAGNOSIS — R32 URINARY INCONTINENCE, UNSPECIFIED TYPE: Primary | ICD-10-CM

## 2025-08-27 PROCEDURE — 97110 THERAPEUTIC EXERCISES: CPT | Mod: GP | Performed by: PHYSICAL THERAPIST

## 2025-09-08 ENCOUNTER — APPOINTMENT (OUTPATIENT)
Dept: PRIMARY CARE | Facility: CLINIC | Age: 70
End: 2025-09-08
Payer: MEDICARE

## 2025-10-21 ENCOUNTER — APPOINTMENT (OUTPATIENT)
Dept: NEUROLOGY | Facility: HOSPITAL | Age: 70
End: 2025-10-21
Payer: MEDICARE

## 2026-04-20 ENCOUNTER — APPOINTMENT (OUTPATIENT)
Dept: DERMATOLOGY | Facility: CLINIC | Age: 71
End: 2026-04-20
Payer: MEDICARE